# Patient Record
Sex: FEMALE | Race: WHITE | Employment: UNEMPLOYED | URBAN - METROPOLITAN AREA
[De-identification: names, ages, dates, MRNs, and addresses within clinical notes are randomized per-mention and may not be internally consistent; named-entity substitution may affect disease eponyms.]

---

## 2017-03-07 ENCOUNTER — HOSPITAL ENCOUNTER (EMERGENCY)
Age: 52
Discharge: HOME | End: 2017-03-07
Attending: EMERGENCY MEDICINE
Payer: COMMERCIAL

## 2017-03-07 VITALS
BODY MASS INDEX: 32.12 KG/M2 | HEART RATE: 85 BPM | SYSTOLIC BLOOD PRESSURE: 145 MMHG | OXYGEN SATURATION: 97 % | RESPIRATION RATE: 21 BRPM | HEIGHT: 58 IN | TEMPERATURE: 98.5 F | DIASTOLIC BLOOD PRESSURE: 65 MMHG | WEIGHT: 153 LBS

## 2017-03-07 DIAGNOSIS — H65.93 BILATERAL NON-SUPPURATIVE OTITIS MEDIA: ICD-10-CM

## 2017-03-07 DIAGNOSIS — H60.393 INFECTIVE OTITIS EXTERNA, BILATERAL: Primary | ICD-10-CM

## 2017-03-07 DIAGNOSIS — I88.9 CERVICAL LYMPHADENITIS: ICD-10-CM

## 2017-03-07 PROCEDURE — 6370000000 HC RX 637 (ALT 250 FOR IP): Performed by: EMERGENCY MEDICINE

## 2017-03-07 PROCEDURE — 99283 EMERGENCY DEPT VISIT LOW MDM: CPT

## 2017-03-07 PROCEDURE — 6360000002 HC RX W HCPCS: Performed by: EMERGENCY MEDICINE

## 2017-03-07 RX ORDER — HYDROCODONE BITARTRATE AND ACETAMINOPHEN 5; 325 MG/1; MG/1
1 TABLET ORAL EVERY 6 HOURS PRN
Qty: 10 TABLET | Refills: 0 | Status: SHIPPED | OUTPATIENT
Start: 2017-03-07 | End: 2017-03-14

## 2017-03-07 RX ORDER — ONDANSETRON 4 MG/1
4 TABLET, ORALLY DISINTEGRATING ORAL ONCE
Status: COMPLETED | OUTPATIENT
Start: 2017-03-07 | End: 2017-03-07

## 2017-03-07 RX ORDER — HYDROCODONE BITARTRATE AND ACETAMINOPHEN 5; 325 MG/1; MG/1
2 TABLET ORAL ONCE
Status: COMPLETED | OUTPATIENT
Start: 2017-03-07 | End: 2017-03-07

## 2017-03-07 RX ORDER — NEOMYCIN SULFATE, POLYMYXIN B SULFATE AND HYDROCORTISONE 10; 3.5; 1 MG/ML; MG/ML; [USP'U]/ML
4 SUSPENSION/ DROPS AURICULAR (OTIC) 4 TIMES DAILY
Qty: 10 ML | Refills: 0 | Status: SHIPPED | OUTPATIENT
Start: 2017-03-07 | End: 2017-03-17

## 2017-03-07 RX ORDER — AMOXICILLIN AND CLAVULANATE POTASSIUM 875; 125 MG/1; MG/1
1 TABLET, FILM COATED ORAL 2 TIMES DAILY
Qty: 20 TABLET | Refills: 0 | Status: SHIPPED | OUTPATIENT
Start: 2017-03-07 | End: 2017-03-17

## 2017-03-07 RX ADMIN — HYDROCODONE BITARTRATE AND ACETAMINOPHEN 2 TABLET: 5; 325 TABLET ORAL at 17:29

## 2017-03-07 RX ADMIN — ONDANSETRON 4 MG: 4 TABLET, ORALLY DISINTEGRATING ORAL at 17:29

## 2017-03-07 ASSESSMENT — ENCOUNTER SYMPTOMS
RHINORRHEA: 1
SORE THROAT: 1
ALLERGIC/IMMUNOLOGIC NEGATIVE: 1
RESPIRATORY NEGATIVE: 1
WHEEZING: 0
EYES NEGATIVE: 1
TROUBLE SWALLOWING: 0
VOMITING: 0
SHORTNESS OF BREATH: 0
ABDOMINAL PAIN: 0
STRIDOR: 0
NAUSEA: 0

## 2017-03-07 ASSESSMENT — PAIN SCALES - GENERAL
PAINLEVEL_OUTOF10: 8
PAINLEVEL_OUTOF10: 8

## 2017-03-07 ASSESSMENT — PAIN DESCRIPTION - DESCRIPTORS: DESCRIPTORS: BURNING

## 2017-03-07 ASSESSMENT — PAIN DESCRIPTION - ORIENTATION: ORIENTATION: LEFT;RIGHT

## 2017-03-07 ASSESSMENT — PAIN DESCRIPTION - PAIN TYPE: TYPE: ACUTE PAIN;CHRONIC PAIN

## 2017-03-07 ASSESSMENT — PAIN DESCRIPTION - LOCATION: LOCATION: EAR

## 2017-03-07 ASSESSMENT — PAIN DESCRIPTION - FREQUENCY: FREQUENCY: CONTINUOUS

## 2017-05-02 ENCOUNTER — OFFICE VISIT (OUTPATIENT)
Dept: INTERNAL MEDICINE | Age: 52
End: 2017-05-02

## 2017-05-02 ENCOUNTER — HOSPITAL ENCOUNTER (OUTPATIENT)
Age: 52
Setting detail: SPECIMEN
Discharge: HOME | End: 2017-05-02
Payer: COMMERCIAL

## 2017-05-02 ENCOUNTER — TELEPHONE (OUTPATIENT)
Dept: INTERNAL MEDICINE | Age: 52
End: 2017-05-02

## 2017-05-02 VITALS
HEART RATE: 81 BPM | HEIGHT: 58 IN | BODY MASS INDEX: 32.87 KG/M2 | DIASTOLIC BLOOD PRESSURE: 78 MMHG | SYSTOLIC BLOOD PRESSURE: 118 MMHG | WEIGHT: 156.6 LBS

## 2017-05-02 DIAGNOSIS — Z72.0 TOBACCO USE: ICD-10-CM

## 2017-05-02 DIAGNOSIS — Z11.4 SCREENING FOR HIV (HUMAN IMMUNODEFICIENCY VIRUS): ICD-10-CM

## 2017-05-02 DIAGNOSIS — H60.312 CHRONIC DIFFUSE OTITIS EXTERNA OF LEFT EAR: Primary | ICD-10-CM

## 2017-05-02 DIAGNOSIS — Z11.59 NEED FOR HEPATITIS C SCREENING TEST: ICD-10-CM

## 2017-05-02 DIAGNOSIS — I25.10 CORONARY ARTERY DISEASE INVOLVING NATIVE HEART WITHOUT ANGINA PECTORIS, UNSPECIFIED VESSEL OR LESION TYPE: ICD-10-CM

## 2017-05-02 DIAGNOSIS — Z12.11 COLON CANCER SCREENING: ICD-10-CM

## 2017-05-02 LAB
ALBUMIN SERPL-MCNC: 4.2 G/DL (ref 3.9–4.9)
ALP BLD-CCNC: 137 U/L (ref 40–130)
ALT SERPL-CCNC: 16 U/L (ref 0–33)
ANION GAP SERPL CALCULATED.3IONS-SCNC: 13 MEQ/L (ref 7–13)
AST SERPL-CCNC: 15 U/L (ref 0–35)
BILIRUB SERPL-MCNC: 0.2 MG/DL (ref 0–1.2)
BUN BLDV-MCNC: 15 MG/DL (ref 6–20)
CALCIUM SERPL-MCNC: 9.1 MG/DL (ref 8.6–10.2)
CHLORIDE BLD-SCNC: 100 MEQ/L (ref 98–107)
CHOLESTEROL, TOTAL: 317 MG/DL (ref 0–199)
CO2: 24 MEQ/L (ref 22–29)
CREAT SERPL-MCNC: 0.49 MG/DL (ref 0.5–0.9)
GFR AFRICAN AMERICAN: >60
GFR NON-AFRICAN AMERICAN: >60
GLOBULIN: 3 G/DL (ref 2.3–3.5)
GLUCOSE BLD-MCNC: 105 MG/DL (ref 74–109)
HDLC SERPL-MCNC: 49 MG/DL (ref 40–59)
HEPATITIS C ANTIBODY INTERPRETATION: NORMAL
LDL CHOLESTEROL CALCULATED: 239 MG/DL (ref 0–129)
POTASSIUM SERPL-SCNC: 4.2 MEQ/L (ref 3.5–5.1)
SODIUM BLD-SCNC: 137 MEQ/L (ref 132–144)
TOTAL PROTEIN: 7.2 G/DL (ref 6.4–8.1)
TRIGL SERPL-MCNC: 145 MG/DL (ref 0–200)

## 2017-05-02 PROCEDURE — 80061 LIPID PANEL: CPT

## 2017-05-02 PROCEDURE — 36415 COLL VENOUS BLD VENIPUNCTURE: CPT | Performed by: FAMILY MEDICINE

## 2017-05-02 PROCEDURE — 80053 COMPREHEN METABOLIC PANEL: CPT

## 2017-05-02 PROCEDURE — 99214 OFFICE O/P EST MOD 30 MIN: CPT | Performed by: FAMILY MEDICINE

## 2017-05-02 PROCEDURE — 86803 HEPATITIS C AB TEST: CPT

## 2017-05-02 PROCEDURE — 86703 HIV-1/HIV-2 1 RESULT ANTBDY: CPT

## 2017-05-02 RX ORDER — AMOXICILLIN AND CLAVULANATE POTASSIUM 875; 125 MG/1; MG/1
1 TABLET, FILM COATED ORAL 2 TIMES DAILY
Qty: 20 TABLET | Refills: 0 | Status: SHIPPED | OUTPATIENT
Start: 2017-05-02 | End: 2017-05-12

## 2017-05-02 RX ORDER — METHYLPREDNISOLONE 4 MG/1
TABLET ORAL
Qty: 1 KIT | Refills: 0 | Status: SHIPPED | OUTPATIENT
Start: 2017-05-02 | End: 2017-05-25 | Stop reason: ALTCHOICE

## 2017-05-02 RX ORDER — CIPROFLOXACIN AND DEXAMETHASONE 3; 1 MG/ML; MG/ML
4 SUSPENSION/ DROPS AURICULAR (OTIC) 2 TIMES DAILY
Qty: 7.5 ML | Refills: 0 | Status: SHIPPED | OUTPATIENT
Start: 2017-05-02 | End: 2017-05-09

## 2017-05-02 ASSESSMENT — PATIENT HEALTH QUESTIONNAIRE - PHQ9
SUM OF ALL RESPONSES TO PHQ QUESTIONS 1-9: 0
1. LITTLE INTEREST OR PLEASURE IN DOING THINGS: 0
2. FEELING DOWN, DEPRESSED OR HOPELESS: 0
SUM OF ALL RESPONSES TO PHQ9 QUESTIONS 1 & 2: 0

## 2017-05-02 ASSESSMENT — ENCOUNTER SYMPTOMS
EYE PAIN: 0
CHEST TIGHTNESS: 0
EYE DISCHARGE: 0
APNEA: 0
EYE ITCHING: 0
CHOKING: 0

## 2017-05-04 ENCOUNTER — OFFICE VISIT (OUTPATIENT)
Dept: INTERNAL MEDICINE | Age: 52
End: 2017-05-04

## 2017-05-04 VITALS
HEART RATE: 92 BPM | DIASTOLIC BLOOD PRESSURE: 100 MMHG | WEIGHT: 157 LBS | HEIGHT: 58 IN | BODY MASS INDEX: 32.95 KG/M2 | SYSTOLIC BLOOD PRESSURE: 148 MMHG

## 2017-05-04 DIAGNOSIS — Z72.0 TOBACCO USE: ICD-10-CM

## 2017-05-04 DIAGNOSIS — I25.10 CORONARY ARTERY DISEASE INVOLVING NATIVE HEART WITHOUT ANGINA PECTORIS, UNSPECIFIED VESSEL OR LESION TYPE: ICD-10-CM

## 2017-05-04 DIAGNOSIS — E78.00 PURE HYPERCHOLESTEROLEMIA: Primary | ICD-10-CM

## 2017-05-04 LAB — HIV-1 AND HIV-2 ANTIBODIES: NEGATIVE

## 2017-05-04 PROCEDURE — 99214 OFFICE O/P EST MOD 30 MIN: CPT | Performed by: FAMILY MEDICINE

## 2017-05-04 RX ORDER — ATORVASTATIN CALCIUM 20 MG/1
20 TABLET, FILM COATED ORAL DAILY
Qty: 30 TABLET | Refills: 3 | Status: SHIPPED | OUTPATIENT
Start: 2017-05-04

## 2017-05-04 RX ORDER — VARENICLINE TARTRATE 25 MG
KIT ORAL
Qty: 1 EACH | Refills: 0 | Status: SHIPPED | OUTPATIENT
Start: 2017-05-04

## 2017-05-04 RX ORDER — VARENICLINE TARTRATE 1 MG/1
1 TABLET, FILM COATED ORAL 2 TIMES DAILY
Qty: 60 TABLET | Refills: 3 | Status: SHIPPED | OUTPATIENT
Start: 2017-05-04

## 2017-05-25 ENCOUNTER — OFFICE VISIT (OUTPATIENT)
Dept: INTERNAL MEDICINE | Age: 52
End: 2017-05-25

## 2017-05-25 ENCOUNTER — HOSPITAL ENCOUNTER (OUTPATIENT)
Age: 52
Setting detail: SPECIMEN
Discharge: HOME | End: 2017-05-25
Payer: COMMERCIAL

## 2017-05-25 VITALS
DIASTOLIC BLOOD PRESSURE: 72 MMHG | SYSTOLIC BLOOD PRESSURE: 104 MMHG | HEART RATE: 99 BPM | BODY MASS INDEX: 32.62 KG/M2 | HEIGHT: 58 IN | WEIGHT: 155.4 LBS

## 2017-05-25 DIAGNOSIS — Z12.11 COLON CANCER SCREENING: ICD-10-CM

## 2017-05-25 DIAGNOSIS — H92.02 LEFT EAR PAIN: ICD-10-CM

## 2017-05-25 DIAGNOSIS — Z12.4 ENCOUNTER FOR SCREENING FOR CERVICAL CANCER: ICD-10-CM

## 2017-05-25 DIAGNOSIS — Z12.39 SCREENING FOR BREAST CANCER: ICD-10-CM

## 2017-05-25 DIAGNOSIS — Z00.00 ANNUAL PHYSICAL EXAM: Primary | ICD-10-CM

## 2017-05-25 LAB
CONTROL: NORMAL
HEMOCCULT STL QL: NORMAL

## 2017-05-25 PROCEDURE — 87624 HPV HI-RISK TYP POOLED RSLT: CPT

## 2017-05-25 PROCEDURE — 99396 PREV VISIT EST AGE 40-64: CPT | Performed by: FAMILY MEDICINE

## 2017-06-01 LAB
HPV COMMENT: NORMAL
HPV TYPE 16: NOT DETECTED
HPV TYPE 18: NOT DETECTED
HPVOH (OTHER TYPES): NOT DETECTED

## 2023-06-05 ENCOUNTER — TELEPHONE (OUTPATIENT)
Dept: PRIMARY CARE | Facility: CLINIC | Age: 58
End: 2023-06-05
Payer: COMMERCIAL

## 2023-06-05 NOTE — TELEPHONE ENCOUNTER
codeine-guaifenesin (Robitussin-AC)  mg/5 mL syrup     Pt states she has a deep cough and a sore throat

## 2023-06-06 DIAGNOSIS — J06.9 UPPER RESPIRATORY TRACT INFECTION, UNSPECIFIED TYPE: Primary | ICD-10-CM

## 2023-06-06 RX ORDER — CODEINE PHOSPHATE AND GUAIFENESIN 10; 100 MG/5ML; MG/5ML
5 SOLUTION ORAL EVERY 6 HOURS PRN
Qty: 200 ML | Refills: 0 | Status: SHIPPED | OUTPATIENT
Start: 2023-06-06 | End: 2023-06-13

## 2023-06-06 RX ORDER — AZITHROMYCIN 250 MG/1
TABLET, FILM COATED ORAL
Qty: 6 TABLET | Refills: 0 | Status: SHIPPED | OUTPATIENT
Start: 2023-06-06 | End: 2023-06-11

## 2023-07-07 ENCOUNTER — TELEPHONE (OUTPATIENT)
Dept: PRIMARY CARE | Facility: CLINIC | Age: 58
End: 2023-07-07
Payer: COMMERCIAL

## 2023-07-07 NOTE — TELEPHONE ENCOUNTER
Pt was in about 4-6 months ago and you told her you wanted to do BW 6m later. There is no order in chart. Was it just DM labs? She has apt the 13th but thought you wanted to do BW before then.

## 2023-07-10 DIAGNOSIS — R53.83 FATIGUE, UNSPECIFIED TYPE: Primary | ICD-10-CM

## 2023-07-11 ENCOUNTER — LAB (OUTPATIENT)
Dept: LAB | Facility: LAB | Age: 58
End: 2023-07-11
Payer: COMMERCIAL

## 2023-07-11 DIAGNOSIS — R53.83 FATIGUE, UNSPECIFIED TYPE: ICD-10-CM

## 2023-07-11 LAB
ALANINE AMINOTRANSFERASE (SGPT) (U/L) IN SER/PLAS: 29 U/L (ref 7–45)
ALBUMIN (G/DL) IN SER/PLAS: 4.5 G/DL (ref 3.4–5)
ALKALINE PHOSPHATASE (U/L) IN SER/PLAS: 114 U/L (ref 33–136)
ANION GAP IN SER/PLAS: 13 MMOL/L (ref 10–20)
ASPARTATE AMINOTRANSFERASE (SGOT) (U/L) IN SER/PLAS: 20 U/L (ref 9–39)
BASOPHILS (10*3/UL) IN BLOOD BY AUTOMATED COUNT: 0.05 X10E9/L (ref 0–0.1)
BASOPHILS/100 LEUKOCYTES IN BLOOD BY AUTOMATED COUNT: 0.7 % (ref 0–2)
BILIRUBIN TOTAL (MG/DL) IN SER/PLAS: 0.4 MG/DL (ref 0–1.2)
CALCIUM (MG/DL) IN SER/PLAS: 9.6 MG/DL (ref 8.6–10.3)
CARBON DIOXIDE, TOTAL (MMOL/L) IN SER/PLAS: 27 MMOL/L (ref 21–32)
CHLORIDE (MMOL/L) IN SER/PLAS: 101 MMOL/L (ref 98–107)
CHOLESTEROL (MG/DL) IN SER/PLAS: 217 MG/DL (ref 0–199)
CHOLESTEROL IN HDL (MG/DL) IN SER/PLAS: 39.7 MG/DL
CHOLESTEROL/HDL RATIO: 5.5
CREATININE (MG/DL) IN SER/PLAS: 0.75 MG/DL (ref 0.5–1.05)
EOSINOPHILS (10*3/UL) IN BLOOD BY AUTOMATED COUNT: 0.31 X10E9/L (ref 0–0.7)
EOSINOPHILS/100 LEUKOCYTES IN BLOOD BY AUTOMATED COUNT: 4.4 % (ref 0–6)
ERYTHROCYTE DISTRIBUTION WIDTH (RATIO) BY AUTOMATED COUNT: 13.3 % (ref 11.5–14.5)
ERYTHROCYTE MEAN CORPUSCULAR HEMOGLOBIN CONCENTRATION (G/DL) BY AUTOMATED: 33.1 G/DL (ref 32–36)
ERYTHROCYTE MEAN CORPUSCULAR VOLUME (FL) BY AUTOMATED COUNT: 90 FL (ref 80–100)
ERYTHROCYTES (10*6/UL) IN BLOOD BY AUTOMATED COUNT: 4.65 X10E12/L (ref 4–5.2)
ESTIMATED AVERAGE GLUCOSE FOR HBA1C: 143 MG/DL
GFR FEMALE: >90 ML/MIN/1.73M2
GLUCOSE (MG/DL) IN SER/PLAS: 136 MG/DL (ref 74–99)
HEMATOCRIT (%) IN BLOOD BY AUTOMATED COUNT: 41.7 % (ref 36–46)
HEMOGLOBIN (G/DL) IN BLOOD: 13.8 G/DL (ref 12–16)
HEMOGLOBIN A1C/HEMOGLOBIN TOTAL IN BLOOD: 6.6 %
IMMATURE GRANULOCYTES/100 LEUKOCYTES IN BLOOD BY AUTOMATED COUNT: 0.6 % (ref 0–0.9)
LDL: 111 MG/DL (ref 0–99)
LEUKOCYTES (10*3/UL) IN BLOOD BY AUTOMATED COUNT: 7 X10E9/L (ref 4.4–11.3)
LYMPHOCYTES (10*3/UL) IN BLOOD BY AUTOMATED COUNT: 2.63 X10E9/L (ref 1.2–4.8)
LYMPHOCYTES/100 LEUKOCYTES IN BLOOD BY AUTOMATED COUNT: 37.5 % (ref 13–44)
MONOCYTES (10*3/UL) IN BLOOD BY AUTOMATED COUNT: 0.58 X10E9/L (ref 0.1–1)
MONOCYTES/100 LEUKOCYTES IN BLOOD BY AUTOMATED COUNT: 8.3 % (ref 2–10)
NEUTROPHILS (10*3/UL) IN BLOOD BY AUTOMATED COUNT: 3.4 X10E9/L (ref 1.2–7.7)
NEUTROPHILS/100 LEUKOCYTES IN BLOOD BY AUTOMATED COUNT: 48.5 % (ref 40–80)
NON HDL CHOLESTEROL: 177 MG/DL
PLATELETS (10*3/UL) IN BLOOD AUTOMATED COUNT: 335 X10E9/L (ref 150–450)
POTASSIUM (MMOL/L) IN SER/PLAS: 4.1 MMOL/L (ref 3.5–5.3)
PROTEIN TOTAL: 6.9 G/DL (ref 6.4–8.2)
SODIUM (MMOL/L) IN SER/PLAS: 137 MMOL/L (ref 136–145)
THYROTROPIN (MIU/L) IN SER/PLAS BY DETECTION LIMIT <= 0.05 MIU/L: 10.82 MIU/L (ref 0.44–3.98)
THYROXINE (T4) (UG/DL) IN SER/PLAS: 6.8 UG/DL (ref 4.5–11.1)
TRIGLYCERIDE (MG/DL) IN SER/PLAS: 334 MG/DL (ref 0–149)
UREA NITROGEN (MG/DL) IN SER/PLAS: 20 MG/DL (ref 6–23)
VLDL: 67 MG/DL (ref 0–40)

## 2023-07-11 PROCEDURE — 84436 ASSAY OF TOTAL THYROXINE: CPT

## 2023-07-11 PROCEDURE — 80061 LIPID PANEL: CPT

## 2023-07-11 PROCEDURE — 36415 COLL VENOUS BLD VENIPUNCTURE: CPT

## 2023-07-11 PROCEDURE — 85025 COMPLETE CBC W/AUTO DIFF WBC: CPT

## 2023-07-11 PROCEDURE — 80053 COMPREHEN METABOLIC PANEL: CPT

## 2023-07-11 PROCEDURE — 83036 HEMOGLOBIN GLYCOSYLATED A1C: CPT

## 2023-07-11 PROCEDURE — 84443 ASSAY THYROID STIM HORMONE: CPT

## 2023-07-11 ASSESSMENT — ENCOUNTER SYMPTOMS
SYNCOPE: 0
ORTHOPNEA: 1
WHEEZING: 1
PND: 1
HEMOPTYSIS: 0
SWOLLEN GLANDS: 0
LEG PAIN: 1
ABDOMINAL PAIN: 0
FEVER: 0
SPUTUM PRODUCTION: 1
HEADACHES: 1
NECK PAIN: 0
RHINORRHEA: 0
CLAUDICATION: 1
SORE THROAT: 0
SHORTNESS OF BREATH: 1
VOMITING: 0

## 2023-07-13 ENCOUNTER — TELEPHONE (OUTPATIENT)
Dept: PRIMARY CARE | Facility: CLINIC | Age: 58
End: 2023-07-13

## 2023-07-13 ENCOUNTER — OFFICE VISIT (OUTPATIENT)
Dept: PRIMARY CARE | Facility: CLINIC | Age: 58
End: 2023-07-13
Payer: COMMERCIAL

## 2023-07-13 VITALS
DIASTOLIC BLOOD PRESSURE: 72 MMHG | BODY MASS INDEX: 37.36 KG/M2 | SYSTOLIC BLOOD PRESSURE: 100 MMHG | HEART RATE: 90 BPM | RESPIRATION RATE: 22 BRPM | OXYGEN SATURATION: 95 % | HEIGHT: 58 IN | WEIGHT: 178 LBS | TEMPERATURE: 97.2 F

## 2023-07-13 DIAGNOSIS — E11.9 TYPE 2 DIABETES MELLITUS WITHOUT COMPLICATION, WITHOUT LONG-TERM CURRENT USE OF INSULIN (MULTI): ICD-10-CM

## 2023-07-13 DIAGNOSIS — J44.9 COPD, SEVERE (MULTI): ICD-10-CM

## 2023-07-13 DIAGNOSIS — E03.9 HYPOTHYROIDISM, UNSPECIFIED TYPE: Primary | ICD-10-CM

## 2023-07-13 DIAGNOSIS — I20.9 ANGINA, CLASS IV (CMS-HCC): ICD-10-CM

## 2023-07-13 DIAGNOSIS — F10.29 ALCOHOL DEPENDENCE WITH UNSPECIFIED ALCOHOL-INDUCED DISORDER (MULTI): ICD-10-CM

## 2023-07-13 PROBLEM — I65.29 ASYMPTOMATIC CAROTID ARTERY STENOSIS: Status: ACTIVE | Noted: 2023-07-13

## 2023-07-13 PROBLEM — F10.20 ALCOHOL ADDICTION (MULTI): Status: ACTIVE | Noted: 2023-07-13

## 2023-07-13 PROBLEM — R22.1 NECK SWELLING: Status: ACTIVE | Noted: 2023-07-13

## 2023-07-13 PROBLEM — R49.0 HOARSENESS: Status: ACTIVE | Noted: 2023-07-13

## 2023-07-13 PROBLEM — R73.09 IMPAIRED GLUCOSE METABOLISM: Status: ACTIVE | Noted: 2023-07-13

## 2023-07-13 PROBLEM — F32.A DEPRESSION: Status: ACTIVE | Noted: 2023-07-13

## 2023-07-13 PROBLEM — F41.9 ANXIETY: Status: ACTIVE | Noted: 2023-07-13

## 2023-07-13 PROBLEM — R07.89 ATYPICAL CHEST PAIN: Status: ACTIVE | Noted: 2023-07-13

## 2023-07-13 PROBLEM — I25.10 CAD (CORONARY ARTERY DISEASE): Status: ACTIVE | Noted: 2023-07-13

## 2023-07-13 PROBLEM — E78.00 PURE HYPERCHOLESTEROLEMIA: Status: ACTIVE | Noted: 2017-05-04

## 2023-07-13 PROBLEM — F17.200 NICOTINE USE DISORDER: Status: ACTIVE | Noted: 2023-07-13

## 2023-07-13 PROBLEM — E78.5 HYPERLIPIDEMIA: Status: ACTIVE | Noted: 2023-07-13

## 2023-07-13 PROBLEM — J40 BRONCHITIS: Status: ACTIVE | Noted: 2023-07-13

## 2023-07-13 PROBLEM — R23.2 HOT FLASHES: Status: ACTIVE | Noted: 2023-07-13

## 2023-07-13 PROBLEM — N95.1 SWEATS, MENOPAUSAL: Status: ACTIVE | Noted: 2023-07-13

## 2023-07-13 PROBLEM — M54.2 NECK PAIN: Status: ACTIVE | Noted: 2023-07-13

## 2023-07-13 PROBLEM — I10 HTN (HYPERTENSION), BENIGN: Status: ACTIVE | Noted: 2023-07-13

## 2023-07-13 PROBLEM — R19.5 POSITIVE COLORECTAL CANCER SCREENING USING COLOGUARD TEST: Status: ACTIVE | Noted: 2023-07-13

## 2023-07-13 PROBLEM — B02.23 SHINGLES (HERPES ZOSTER) POLYNEUROPATHY: Status: ACTIVE | Noted: 2023-07-13

## 2023-07-13 PROCEDURE — 99214 OFFICE O/P EST MOD 30 MIN: CPT | Performed by: FAMILY MEDICINE

## 2023-07-13 RX ORDER — SYRINGE-NEEDLE,INSULIN,0.5 ML 28GX1/2"
SYRINGE, EMPTY DISPOSABLE MISCELLANEOUS
COMMUNITY
End: 2024-02-21

## 2023-07-13 RX ORDER — THEOPHYLLINE 400 MG/1
400 TABLET, EXTENDED RELEASE ORAL DAILY
COMMUNITY
End: 2023-12-29 | Stop reason: WASHOUT

## 2023-07-13 RX ORDER — LEVOTHYROXINE SODIUM 50 UG/1
50 TABLET ORAL DAILY
Qty: 30 TABLET | Refills: 11 | Status: SHIPPED | OUTPATIENT
Start: 2023-07-13 | End: 2023-07-20

## 2023-07-13 RX ORDER — NITROGLYCERIN 0.4 MG/1
0.4 TABLET SUBLINGUAL AS NEEDED
COMMUNITY
End: 2023-10-11 | Stop reason: ALTCHOICE

## 2023-07-13 RX ORDER — CEFUROXIME AXETIL 500 MG/1
1 TABLET ORAL EVERY 12 HOURS
Status: ON HOLD | COMMUNITY
Start: 2022-09-21 | End: 2024-03-03 | Stop reason: ALTCHOICE

## 2023-07-13 RX ORDER — LEVOFLOXACIN 500 MG/1
500 TABLET, FILM COATED ORAL DAILY
COMMUNITY
Start: 2023-06-12 | End: 2023-07-13 | Stop reason: ALTCHOICE

## 2023-07-13 RX ORDER — TIOTROPIUM BROMIDE INHALATION SPRAY 3.12 UG/1
SPRAY, METERED RESPIRATORY (INHALATION)
COMMUNITY
Start: 2022-03-03 | End: 2023-07-13 | Stop reason: ALTCHOICE

## 2023-07-13 RX ORDER — SERTRALINE HYDROCHLORIDE 100 MG/1
100 TABLET, FILM COATED ORAL DAILY
COMMUNITY
End: 2023-10-23

## 2023-07-13 ASSESSMENT — ENCOUNTER SYMPTOMS
MYALGIAS: 0
SYNCOPE: 0
HEMOPTYSIS: 0
LEG PAIN: 1
CLAUDICATION: 1
SORE THROAT: 0
FATIGUE: 0
NUMBNESS: 0
SPUTUM PRODUCTION: 1
DIARRHEA: 0
PND: 1
WHEEZING: 1
HEADACHES: 1
FEVER: 0
NECK PAIN: 0
ARTHRALGIAS: 0
ABDOMINAL PAIN: 0
POLYPHAGIA: 0
APPETITE CHANGE: 0
CHEST TIGHTNESS: 0
SWOLLEN GLANDS: 0
VOMITING: 0
DIZZINESS: 0
NAUSEA: 0
WEAKNESS: 0
SHORTNESS OF BREATH: 1
ORTHOPNEA: 1
POLYDIPSIA: 0
FREQUENCY: 0
RHINORRHEA: 0

## 2023-07-13 NOTE — TELEPHONE ENCOUNTER
----- Message from Raghav Garnett DO sent at 7/12/2023  5:22 PM EDT -----  In addition to above message her BS is in the diabetic range. I want her to follow up in the office

## 2023-07-13 NOTE — PROGRESS NOTES
Subjective   Patient ID: Lauren Thompson is a 58 y.o. female who presents for Med Refill (6 month med check. Pt states she feels flare ups of the COPD have become more frequent. She is having them aprox every 2 weeks. She is also on O2 at home. 2 liters. ).  Shortness of Breath  This is a recurrent problem. The current episode started more than 1 year ago. The problem occurs daily. The problem has been gradually worsening. Associated symptoms include chest pain, claudication, headaches, leg pain, leg swelling, orthopnea, PND, sputum production and wheezing. Pertinent negatives include no abdominal pain, coryza, ear pain, fever, hemoptysis, neck pain, rash, rhinorrhea, sore throat, swollen glands, syncope or vomiting. The symptoms are aggravated by occupational exposure, exercise, any activity, weather changes and lying flat.       Review of Systems   Constitutional:  Negative for appetite change, fatigue and fever.   HENT:  Negative for ear pain, rhinorrhea and sore throat.    Eyes:  Negative for visual disturbance.   Respiratory:  Positive for sputum production, shortness of breath and wheezing. Negative for hemoptysis and chest tightness.    Cardiovascular:  Positive for chest pain, orthopnea, claudication, leg swelling and PND. Negative for syncope.   Gastrointestinal:  Negative for abdominal pain, diarrhea, nausea and vomiting.   Endocrine: Negative for polydipsia, polyphagia and polyuria.   Genitourinary:  Negative for frequency and urgency.   Musculoskeletal:  Negative for arthralgias, myalgias and neck pain.   Skin:  Negative for rash.   Neurological:  Positive for headaches. Negative for dizziness, syncope, weakness and numbness.       Objective   Physical Exam  Constitutional:       Appearance: Normal appearance.   HENT:      Head: Normocephalic and atraumatic.      Mouth/Throat:      Mouth: Mucous membranes are moist.   Eyes:      Extraocular Movements: Extraocular movements intact.       Conjunctiva/sclera: Conjunctivae normal.      Pupils: Pupils are equal, round, and reactive to light.      Funduscopic exam:     Right eye: No hemorrhage or AV nicking.         Left eye: No hemorrhage or AV nicking.   Cardiovascular:      Rate and Rhythm: Normal rate and regular rhythm.      Pulses: Normal pulses.      Heart sounds: Normal heart sounds.   Pulmonary:      Effort: Pulmonary effort is normal.      Breath sounds: Wheezing and rhonchi present.   Abdominal:      General: Abdomen is flat. Bowel sounds are normal.      Palpations: Abdomen is soft.   Musculoskeletal:         General: Normal range of motion.      Cervical back: Neck supple.      Right lower le+ Edema present.      Left lower le+ Edema present.      Right foot: No swelling or Charcot foot.      Left foot: No swelling or Charcot foot.   Skin:     General: Skin is warm and dry.      Findings: No wound.   Neurological:      General: No focal deficit present.      Mental Status: She is alert and oriented to person, place, and time.      Sensory: No sensory deficit.      Motor: No weakness.   Psychiatric:         Mood and Affect: Mood normal.         Assessment/Plan   Problem List Items Addressed This Visit       Alcohol addiction (CMS/[facility])     [facility] reviewed and follow up yearly         Angina, class IV (CMS/[facility])     [facility] reviwed and follow up yearly         Relevant Medications    nitroglycerin (Nitrostat) 0.4 mg SL tablet    COPD, severe (CMS/[facility])     [facility] reviewed and follow up yearly         Hypothyroidism - Primary    Relevant Medications    Synthroid 50 mcg tablet     Other Visit Diagnoses       Type 2 diabetes mellitus without complication, without long-term current use of insulin (CMS/[facility])        Relevant Orders    Referral to Nutrition Services          Discussed yearly eye exam, regular foot checks and yearly lab evaluation.  Regular exercise and diabetic diet discussed.

## 2023-07-13 NOTE — TELEPHONE ENCOUNTER
----- Message from Raghav Garnett DO sent at 7/11/2023  4:15 PM EDT -----  Please call the patient regarding her abnormal result.  Call pt her chol is down to [phone] this is good but her thyroid is becoming mor underactive.  Would she consider a low dose of thyroid medication?

## 2023-07-14 ENCOUNTER — TELEPHONE (OUTPATIENT)
Dept: PRIMARY CARE | Facility: CLINIC | Age: 58
End: 2023-07-14
Payer: COMMERCIAL

## 2023-07-26 DIAGNOSIS — E03.9 HYPOTHYROIDISM, UNSPECIFIED TYPE: ICD-10-CM

## 2023-07-26 RX ORDER — LEVOTHYROXINE SODIUM 50 UG/1
50 TABLET ORAL DAILY
Qty: 30 TABLET | Refills: 3 | Status: SHIPPED | OUTPATIENT
Start: 2023-07-26 | End: 2023-07-26 | Stop reason: SDUPTHER

## 2023-07-26 RX ORDER — LEVOTHYROXINE SODIUM 50 UG/1
50 TABLET ORAL DAILY
Qty: 30 TABLET | Refills: 3 | Status: SHIPPED | OUTPATIENT
Start: 2023-07-26 | End: 2023-08-17

## 2023-08-10 ENCOUNTER — OFFICE VISIT (OUTPATIENT)
Dept: OBGYN CLINIC | Age: 58
End: 2023-08-10
Payer: COMMERCIAL

## 2023-08-10 VITALS
DIASTOLIC BLOOD PRESSURE: 72 MMHG | HEIGHT: 58 IN | WEIGHT: 179 LBS | SYSTOLIC BLOOD PRESSURE: 130 MMHG | BODY MASS INDEX: 37.57 KG/M2

## 2023-08-10 DIAGNOSIS — N81.11 MIDLINE CYSTOCELE: Primary | ICD-10-CM

## 2023-08-10 PROCEDURE — 3017F COLORECTAL CA SCREEN DOC REV: CPT | Performed by: OBSTETRICS & GYNECOLOGY

## 2023-08-10 PROCEDURE — G8427 DOCREV CUR MEDS BY ELIG CLIN: HCPCS | Performed by: OBSTETRICS & GYNECOLOGY

## 2023-08-10 PROCEDURE — G8417 CALC BMI ABV UP PARAM F/U: HCPCS | Performed by: OBSTETRICS & GYNECOLOGY

## 2023-08-10 PROCEDURE — 99203 OFFICE O/P NEW LOW 30 MIN: CPT | Performed by: OBSTETRICS & GYNECOLOGY

## 2023-08-10 PROCEDURE — 1036F TOBACCO NON-USER: CPT | Performed by: OBSTETRICS & GYNECOLOGY

## 2023-08-10 RX ORDER — RANOLAZINE 500 MG/1
500 TABLET, EXTENDED RELEASE ORAL EVERY 12 HOURS
COMMUNITY
Start: 2023-06-09

## 2023-08-10 RX ORDER — GUAIFENESIN 600 MG/1
TABLET, EXTENDED RELEASE ORAL
COMMUNITY
Start: 2023-06-13

## 2023-08-10 RX ORDER — THEOPHYLLINE 400 MG/1
400 TABLET, EXTENDED RELEASE ORAL DAILY
COMMUNITY
Start: 2023-07-15

## 2023-08-10 RX ORDER — FLUTICASONE FUROATE, UMECLIDINIUM BROMIDE AND VILANTEROL TRIFENATATE 100; 62.5; 25 UG/1; UG/1; UG/1
POWDER RESPIRATORY (INHALATION)
COMMUNITY
Start: 2023-02-28

## 2023-08-10 RX ORDER — IPRATROPIUM BROMIDE AND ALBUTEROL SULFATE 2.5; .5 MG/3ML; MG/3ML
3 SOLUTION RESPIRATORY (INHALATION) EVERY 6 HOURS PRN
COMMUNITY
Start: 2023-02-11

## 2023-08-10 RX ORDER — SERTRALINE HYDROCHLORIDE 100 MG/1
100 TABLET, FILM COATED ORAL DAILY
COMMUNITY
Start: 2023-07-07

## 2023-08-10 RX ORDER — LISINOPRIL AND HYDROCHLOROTHIAZIDE 25; 20 MG/1; MG/1
1 TABLET ORAL DAILY
COMMUNITY
Start: 2023-06-12

## 2023-08-10 RX ORDER — LEVOTHYROXINE SODIUM 0.05 MG/1
50 TABLET ORAL DAILY
COMMUNITY
Start: 2023-07-26

## 2023-08-10 RX ORDER — METOPROLOL SUCCINATE 25 MG/1
25 TABLET, EXTENDED RELEASE ORAL DAILY
COMMUNITY
Start: 2023-06-09

## 2023-08-10 ASSESSMENT — ENCOUNTER SYMPTOMS
CONSTIPATION: 0
ALLERGIC/IMMUNOLOGIC NEGATIVE: 1
BLOOD IN STOOL: 0
ABDOMINAL DISTENTION: 0
VOMITING: 0
ANAL BLEEDING: 0
RECTAL PAIN: 0
NAUSEA: 0
DIARRHEA: 0
EYES NEGATIVE: 1
ABDOMINAL PAIN: 0
RESPIRATORY NEGATIVE: 1

## 2023-08-10 NOTE — PROGRESS NOTES
The patient was asked if she would like a chaperone present for her intimate exam. She  Declined the chaperone.  Genaro Lea CMA ([address] [facility])

## 2023-08-14 SDOH — ECONOMIC STABILITY: FOOD INSECURITY: WITHIN THE PAST 12 MONTHS, YOU WORRIED THAT YOUR FOOD WOULD RUN OUT BEFORE YOU GOT MONEY TO BUY MORE.: SOMETIMES TRUE

## 2023-08-14 SDOH — ECONOMIC STABILITY: FOOD INSECURITY: WITHIN THE PAST 12 MONTHS, THE FOOD YOU BOUGHT JUST DIDN'T LAST AND YOU DIDN'T HAVE MONEY TO GET MORE.: NEVER TRUE

## 2023-08-14 SDOH — ECONOMIC STABILITY: TRANSPORTATION INSECURITY
IN THE PAST 12 MONTHS, HAS LACK OF TRANSPORTATION KEPT YOU FROM MEETINGS, WORK, OR FROM GETTING THINGS NEEDED FOR DAILY LIVING?: NO

## 2023-08-14 SDOH — ECONOMIC STABILITY: INCOME INSECURITY: HOW HARD IS IT FOR YOU TO PAY FOR THE VERY BASICS LIKE FOOD, HOUSING, MEDICAL CARE, AND HEATING?: SOMEWHAT HARD

## 2023-08-14 SDOH — ECONOMIC STABILITY: HOUSING INSECURITY
IN THE LAST 12 MONTHS, WAS THERE A TIME WHEN YOU DID NOT HAVE A STEADY PLACE TO SLEEP OR SLEPT IN A SHELTER (INCLUDING NOW)?: NO

## 2023-08-14 ASSESSMENT — PATIENT HEALTH QUESTIONNAIRE - PHQ9
1. LITTLE INTEREST OR PLEASURE IN DOING THINGS: SEVERAL DAYS
SUM OF ALL RESPONSES TO PHQ9 QUESTIONS 1 & 2: 2
2. FEELING DOWN, DEPRESSED OR HOPELESS: SEVERAL DAYS
2. FEELING DOWN, DEPRESSED OR HOPELESS: 1
SUM OF ALL RESPONSES TO PHQ9 QUESTIONS 1 & 2: 2
1. LITTLE INTEREST OR PLEASURE IN DOING THINGS: 1
SUM OF ALL RESPONSES TO PHQ QUESTIONS 1-9: 2

## 2023-08-17 ENCOUNTER — OFFICE VISIT (OUTPATIENT)
Dept: OBGYN CLINIC | Age: 58
End: 2023-08-17
Payer: COMMERCIAL

## 2023-08-17 VITALS
HEIGHT: 58 IN | DIASTOLIC BLOOD PRESSURE: 74 MMHG | WEIGHT: 178 LBS | HEART RATE: 86 BPM | BODY MASS INDEX: 37.36 KG/M2 | SYSTOLIC BLOOD PRESSURE: 116 MMHG

## 2023-08-17 DIAGNOSIS — N81.11 MIDLINE CYSTOCELE: Primary | ICD-10-CM

## 2023-08-17 PROBLEM — Z80.41 FAMILY HISTORY OF OVARIAN CANCER: Status: ACTIVE | Noted: 2023-08-17

## 2023-08-17 PROBLEM — N39.3 SUI (STRESS URINARY INCONTINENCE, FEMALE): Status: ACTIVE | Noted: 2023-08-17

## 2023-08-17 PROBLEM — N81.10 CYSTOCELE, UNSPECIFIED: Status: ACTIVE | Noted: 2023-08-17

## 2023-08-17 PROCEDURE — 1036F TOBACCO NON-USER: CPT | Performed by: OBSTETRICS & GYNECOLOGY

## 2023-08-17 PROCEDURE — 99214 OFFICE O/P EST MOD 30 MIN: CPT | Performed by: OBSTETRICS & GYNECOLOGY

## 2023-08-17 PROCEDURE — 3017F COLORECTAL CA SCREEN DOC REV: CPT | Performed by: OBSTETRICS & GYNECOLOGY

## 2023-08-17 PROCEDURE — G8427 DOCREV CUR MEDS BY ELIG CLIN: HCPCS | Performed by: OBSTETRICS & GYNECOLOGY

## 2023-08-17 PROCEDURE — G8417 CALC BMI ABV UP PARAM F/U: HCPCS | Performed by: OBSTETRICS & GYNECOLOGY

## 2023-08-17 RX ORDER — CONJUGATED ESTROGENS 0.62 MG/G
0.5 CREAM VAGINAL DAILY
Qty: 30 G | Refills: 0
Start: 2023-08-17 | End: 2023-08-18

## 2023-08-17 ASSESSMENT — ENCOUNTER SYMPTOMS
COLOR CHANGE: 0
WHEEZING: 0
CHEST TIGHTNESS: 0
TROUBLE SWALLOWING: 0
COUGH: 0
BACK PAIN: 0
SHORTNESS OF BREATH: 0
NAUSEA: 0
VOMITING: 0
BLOOD IN STOOL: 0
ABDOMINAL DISTENTION: 0
VOICE CHANGE: 0
SORE THROAT: 0
ABDOMINAL PAIN: 0
CONSTIPATION: 0

## 2023-08-17 NOTE — PROGRESS NOTES
HPI:  Mario Yan (: 1965) is a 59 y.o. female, Established patient, here for evaluation of the following chief complaint(s):  Consultation (Consult from Dr. Nathaniel Florentino for her Bladder.)  Patient is here for evaluation of a cystocele and stress urinary incontinence. A 56-year-old menopausal female. She is not on any hormones. She complains of dyspareunia. She had a hysterectomy for endometriosis and cervical dysplasia. There is a family history of ovarian cancer. SUBJECTIVE/OBJECTIVE:    Past Surgical History:   Procedure Laterality Date    APPENDECTOMY      HYSTERECTOMY (CERVIX STATUS UNKNOWN)      HYSTERECTOMY (CERVIX STATUS UNKNOWN)      partial    HYSTERECTOMY, TOTAL ABDOMINAL (CERVIX REMOVED)      NOSE SURGERY      broken in 2 places    TONSILLECTOMY          Review of Systems   Constitutional:  Negative for activity change, appetite change, fatigue and unexpected weight change. HENT:  Negative for dental problem, ear pain, hearing loss, nosebleeds, sore throat, trouble swallowing and voice change. Eyes:  Negative for visual disturbance. Respiratory:  Negative for cough, chest tightness, shortness of breath and wheezing. Cardiovascular:  Negative for chest pain and palpitations. Gastrointestinal:  Negative for abdominal distention, abdominal pain, blood in stool, constipation, nausea and vomiting. Endocrine: Negative for cold intolerance, heat intolerance, polydipsia, polyphagia and polyuria. Genitourinary:  Positive for difficulty urinating. Negative for dyspareunia, dysuria, flank pain, frequency, genital sores, hematuria, menstrual problem, pelvic pain, urgency, vaginal bleeding, vaginal discharge and vaginal pain. Musculoskeletal:  Negative for arthralgias, back pain, joint swelling and myalgias. Skin:  Negative for color change and rash. Allergic/Immunologic: Negative for environmental allergies, food allergies and immunocompromised state.    Neurological:  Negative

## 2023-08-18 RX ORDER — CONJUGATED ESTROGENS 0.62 MG/G
CREAM VAGINAL
Qty: 30 G | Refills: 0 | Status: SHIPPED | OUTPATIENT
Start: 2023-08-18

## 2023-08-19 RX ORDER — SODIUM CHLORIDE 9 MG/ML
INJECTION, SOLUTION INTRAVENOUS PRN
OUTPATIENT
Start: 2023-08-19

## 2023-08-19 RX ORDER — SODIUM CHLORIDE, SODIUM LACTATE, POTASSIUM CHLORIDE, CALCIUM CHLORIDE 600; 310; 30; 20 MG/100ML; MG/100ML; MG/100ML; MG/100ML
INJECTION, SOLUTION INTRAVENOUS CONTINUOUS
OUTPATIENT
Start: 2023-08-19

## 2023-08-19 RX ORDER — SODIUM CHLORIDE 0.9 % (FLUSH) 0.9 %
5-40 SYRINGE (ML) INJECTION EVERY 12 HOURS SCHEDULED
OUTPATIENT
Start: 2023-08-19

## 2023-08-19 RX ORDER — SODIUM CHLORIDE 0.9 % (FLUSH) 0.9 %
5-40 SYRINGE (ML) INJECTION PRN
OUTPATIENT
Start: 2023-08-19

## 2023-08-24 ENCOUNTER — HOSPITAL ENCOUNTER (OUTPATIENT)
Dept: ULTRASOUND IMAGING | Age: 58
Discharge: HOME | End: 2023-08-26
Payer: COMMERCIAL

## 2023-08-24 DIAGNOSIS — N81.11 MIDLINE CYSTOCELE: ICD-10-CM

## 2023-08-24 PROCEDURE — 76830 TRANSVAGINAL US NON-OB: CPT

## 2023-08-24 PROCEDURE — 93975 VASCULAR STUDY: CPT

## 2023-08-24 PROCEDURE — 76856 US EXAM PELVIC COMPLETE: CPT

## 2023-08-28 DIAGNOSIS — E03.9 HYPOTHYROIDISM, UNSPECIFIED TYPE: Primary | ICD-10-CM

## 2023-08-30 PROBLEM — E66.812 CLASS 2 OBESITY WITH BODY MASS INDEX (BMI) OF 35 TO 39.9 WITHOUT COMORBIDITY: Status: ACTIVE | Noted: 2023-08-30

## 2023-08-30 PROBLEM — E66.9 CLASS 2 OBESITY WITH BODY MASS INDEX (BMI) OF 35 TO 39.9 WITHOUT COMORBIDITY: Status: ACTIVE | Noted: 2023-08-30

## 2023-08-30 RX ORDER — CONJUGATED ESTROGENS 0.62 MG/G
CREAM VAGINAL 2 TIMES DAILY
COMMUNITY
Start: 2023-08-18 | End: 2023-10-11 | Stop reason: ALTCHOICE

## 2023-09-07 ENCOUNTER — TELEPHONE (OUTPATIENT)
Dept: OBGYN CLINIC | Age: 58
End: 2023-09-07

## 2023-09-07 NOTE — TELEPHONE ENCOUNTER
Patient informed. Would like to know if she should stop the 325mg. Asprin that she takes daily before her surgery? Please advise.

## 2023-09-07 NOTE — TELEPHONE ENCOUNTER
----- Message from Rachell Gibbons DO sent at 9/4/2023  8:31 AM EDT -----  Both ovaries are present and normal. Will remove

## 2023-09-12 LAB — THEOPHYLLINE (UG/ML) IN SER/PLAS: 4.2 UG/ML (ref 10–20)

## 2023-09-13 RX ORDER — PREDNISONE 10 MG/1
TABLET ORAL
Status: ON HOLD | COMMUNITY
Start: 2023-09-12 | End: 2024-03-03 | Stop reason: ALTCHOICE

## 2023-09-13 RX ORDER — CONJUGATED ESTROGENS 0.62 MG/G
CREAM VAGINAL
Qty: 30 G | Refills: 0 | Status: SHIPPED | OUTPATIENT
Start: 2023-09-13

## 2023-09-14 RX ORDER — CONJUGATED ESTROGENS 0.62 MG/G
CREAM VAGINAL
Qty: 30 G | Refills: 0 | OUTPATIENT
Start: 2023-09-14

## 2023-09-18 ENCOUNTER — HOSPITAL ENCOUNTER (OUTPATIENT)
Dept: PREADMISSION TESTING | Age: 58
Discharge: HOME | End: 2023-09-22
Payer: COMMERCIAL

## 2023-09-18 VITALS
BODY MASS INDEX: 37.83 KG/M2 | RESPIRATION RATE: 16 BRPM | OXYGEN SATURATION: 98 % | WEIGHT: 180.2 LBS | HEIGHT: 58 IN | HEART RATE: 77 BPM | TEMPERATURE: 98.4 F | DIASTOLIC BLOOD PRESSURE: 79 MMHG | SYSTOLIC BLOOD PRESSURE: 156 MMHG

## 2023-09-18 PROBLEM — E03.9 HYPOTHYROIDISM: Status: ACTIVE | Noted: 2023-07-13

## 2023-09-18 PROBLEM — R07.89 ATYPICAL CHEST PAIN: Status: ACTIVE | Noted: 2023-07-13

## 2023-09-18 PROBLEM — E11.9 DIABETES (HCC): Status: ACTIVE | Noted: 2023-09-18

## 2023-09-18 PROBLEM — F41.9 ANXIETY: Status: ACTIVE | Noted: 2023-07-13

## 2023-09-18 PROBLEM — F10.20 ALCOHOL ADDICTION (HCC): Status: ACTIVE | Noted: 2023-07-13

## 2023-09-18 PROBLEM — E78.5 HYPERLIPIDEMIA: Status: ACTIVE | Noted: 2023-07-13

## 2023-09-18 PROBLEM — I10 HTN (HYPERTENSION), BENIGN: Status: ACTIVE | Noted: 2023-07-13

## 2023-09-18 PROBLEM — J44.9 COPD, SEVERE (HCC): Status: ACTIVE | Noted: 2020-12-17

## 2023-09-18 PROBLEM — I65.29 ASYMPTOMATIC CAROTID ARTERY STENOSIS: Status: ACTIVE | Noted: 2023-07-13

## 2023-09-18 PROBLEM — I20.9 ANGINA, CLASS IV (HCC): Status: ACTIVE | Noted: 2023-07-13

## 2023-09-18 PROBLEM — F32.A DEPRESSION: Status: ACTIVE | Noted: 2023-07-13

## 2023-09-18 LAB
BILIRUB UR QL STRIP: NEGATIVE
CLARITY UR: CLEAR
COLOR UR: YELLOW
EKG ATRIAL RATE: 75 BPM
EKG P AXIS: -17 DEGREES
EKG P-R INTERVAL: 162 MS
EKG Q-T INTERVAL: 388 MS
EKG QRS DURATION: 84 MS
EKG QTC CALCULATION (BAZETT): 433 MS
EKG R AXIS: 73 DEGREES
EKG T AXIS: 55 DEGREES
EKG VENTRICULAR RATE: 75 BPM
GLUCOSE UR STRIP-MCNC: NEGATIVE MG/DL
HGB UR QL STRIP: NEGATIVE
KETONES UR STRIP-MCNC: NEGATIVE MG/DL
LEUKOCYTE ESTERASE UR QL STRIP: NEGATIVE
NITRITE UR QL STRIP: NEGATIVE
PH UR STRIP: 6 [PH] (ref 5–9)
PROT UR STRIP-MCNC: NEGATIVE MG/DL
SP GR UR STRIP: 1.01 (ref 1–1.03)
UROBILINOGEN UR STRIP-ACNC: 0.2 E.U./DL

## 2023-09-18 PROCEDURE — 93005 ELECTROCARDIOGRAM TRACING: CPT | Performed by: FAMILY MEDICINE

## 2023-09-18 PROCEDURE — 81003 URINALYSIS AUTO W/O SCOPE: CPT

## 2023-09-18 PROCEDURE — 93010 ELECTROCARDIOGRAM REPORT: CPT | Performed by: INTERNAL MEDICINE

## 2023-09-18 RX ORDER — PREDNISONE 10 MG/1
10 TABLET ORAL DAILY
COMMUNITY
Start: 2023-09-12

## 2023-09-18 RX ORDER — DOXYCYCLINE HYCLATE 100 MG/1
100 CAPSULE ORAL DAILY
COMMUNITY
Start: 2023-09-12

## 2023-09-18 RX ORDER — ATORVASTATIN CALCIUM 40 MG/1
40 TABLET, FILM COATED ORAL DAILY
COMMUNITY
Start: 2023-09-08

## 2023-09-18 ASSESSMENT — ENCOUNTER SYMPTOMS
EYE ITCHING: 0
SORE THROAT: 0
VOMITING: 0
ABDOMINAL DISTENTION: 0
EYE PAIN: 0
SHORTNESS OF BREATH: 1
SINUS PRESSURE: 0
CHOKING: 0
SINUS PAIN: 0
APNEA: 0
RHINORRHEA: 0
EYE DISCHARGE: 0
WHEEZING: 1
TROUBLE SWALLOWING: 0
NAUSEA: 0
DIARRHEA: 0
COUGH: 1
CHEST TIGHTNESS: 0
ABDOMINAL PAIN: 0
BACK PAIN: 0
EYE REDNESS: 0
PHOTOPHOBIA: 0
CONSTIPATION: 0
FACIAL SWELLING: 0

## 2023-09-18 NOTE — H&P
She is better today, no wheezes or SOB. Will contact Dr. Licha Merchant for recent office visit and clearance. Notified Dr. Anuradha Ward office and will contact office again when I have any correspondence from Dr. Licha Merchant. Patient is followed by Dr. Fung, cardiology. She has had 2 myocardial infarctions and 3 cardiac caths (no stents). She was seen 12/19/22 and had cath that showed some stenosis and left ventricle EF 60%. Patient is followed by Dr. Ingrid Hargrove, vascular surgeon, for her dx carotid artery stenosis. Her last appointment was 5/15/23 and her imaging showed: Left 50-69% stenosis, similar to prior scan, and right-no stenosis. She has a follow up yearly. Known Anesthesia Problems: None  Patient Objection to Receiving Blood Products: No  Personal of FH of DVT/PE: No    Medical/Cardiac Clearance Needed: Not Required    ALLERGIES: Patient has no known allergies.     PAST MEDICAL HISTORY:    Past Medical History:   Diagnosis Date    Abnormal Pap smear of cervix     Arthritis     CAD (coronary artery disease)     CAD (coronary artery disease)     MI x 2 no stents    COPD (chronic obstructive pulmonary disease) ([address])     Diabetes ([address])     currently diet controlled    Dysplasia of cervix     Endometriosis     Hyperlipidemia     Hypertension     Hypotension     Pure hypercholesterolemia 05/04/2017    Thyroid disease     Tobacco use 05/02/2017     PAST SURGICAL HISTORY:    Past Surgical History:   Procedure Laterality Date    APPENDECTOMY      COLONOSCOPY  04/2023    COSMETIC SURGERY      broken nose, repaired    HYSTERECTOMY (CERVIX STATUS UNKNOWN)      abdominal hysterectomy    TONSILLECTOMY      VASCULAR SURGERY      cardiac cath x3 (no stents)     FAMILY HISTORY:    Family History   Problem Relation Age of Onset    Diabetes Mother     Heart Disease Mother     High Cholesterol Mother     High Blood Pressure Mother     High Blood Pressure Father     High Cholesterol Father     Diabetes Sister     High

## 2023-09-20 NOTE — PROGRESS NOTES
Received office note from Dr. Anthony Farley, pulmonology, with clearance for surgery. Office note placed in paper chart.

## 2023-09-24 ENCOUNTER — ANESTHESIA EVENT (OUTPATIENT)
Dept: OPERATING ROOM | Age: 58
End: 2023-09-24
Payer: COMMERCIAL

## 2023-09-24 ASSESSMENT — COPD QUESTIONNAIRES: CAT_SEVERITY: SEVERE

## 2023-09-24 ASSESSMENT — LIFESTYLE VARIABLES: SMOKING_STATUS: 1

## 2023-09-24 NOTE — ANESTHESIA PRE PROCEDURE
Department of Anesthesiology  Preprocedure Note       Name:  Joselo Barba   Age:  59 y.o.  :  1965                                          MRN:  [id number]         Date:  2023      Surgeon: Shahla Rondon):  Jason Argueta DO    Procedure: Procedure(s):  OPERATIVE LAPAROSCOPIC BILATERAL SALPINGO-OOPHORECTOMY,  TRANSVAGINAL TRANSOBTURATOR TAPE, CYSTSCOPY, POSSIBLE ANTERIOR REPAIR    Medications prior to admission:   Prior to Admission medications    Medication Sig Start Date End Date Taking? Authorizing Provider   atorvastatin (LIPITOR) 40 MG tablet Take 1 tablet by mouth daily 23   Historical Provider, MD   doxycycline hyclate (VIBRAMYCIN) 100 MG capsule Take 1 capsule by mouth daily 23   Historical Provider, MD   predniSONE (DELTASONE) 10 MG tablet Take 1 tablet by mouth daily 23   Historical Provider, MD   PREMARIN 0.625 MG/GM CREA vaginal cream APPLY SPARINGLY TO VAGINA TWICE WEEKLY 23   Darron Sari Lees DO   ipratropium 0.5 mg-albuterol 2.5 mg (DUONEB) 0.5-2.5 (3) MG/3ML SOLN nebulizer solution Inhale 3 mLs into the lungs every 6 hours as needed 23   Historical Provider, MD   lisinopril-hydroCHLOROthiazide (PRINZIDE;ZESTORETIC) 20-25 MG per tablet Take 1 tablet by mouth daily 23   Historical Provider, MD   metoprolol succinate (TOPROL XL) 25 MG extended release tablet Take 1 tablet by mouth daily 23   Historical Provider, MD   MUCUS RELIEF 600 MG extended release tablet TAKE 1 OR 2 TABLETS BY MOUTH TWICE DAILY AS NEEDED FOR CONGESTION. 23   Historical Provider, MD   ranolazine (RANEXA) 500 MG extended release tablet Take 1 tablet by mouth in the morning and 1 tablet in the evening.  23   Historical Provider, MD   theophylline (UNIPHYL) 400 MG extended release tablet Take 1 tablet by mouth daily 7/15/23   Historical Provider, MD   levothyroxine (SYNTHROID) 50 MCG tablet Take 1 tablet by mouth daily 23   Historical Provider, MD   sertraline (ZOLOFT) 100 MG

## 2023-09-25 ENCOUNTER — HOSPITAL ENCOUNTER (OUTPATIENT)
Age: 58
Setting detail: OUTPATIENT SURGERY
Discharge: HOME | End: 2023-09-25
Attending: OBSTETRICS & GYNECOLOGY | Admitting: OBSTETRICS & GYNECOLOGY
Payer: COMMERCIAL

## 2023-09-25 ENCOUNTER — ANESTHESIA (OUTPATIENT)
Dept: OPERATING ROOM | Age: 58
End: 2023-09-25
Payer: COMMERCIAL

## 2023-09-25 VITALS
HEIGHT: 58 IN | OXYGEN SATURATION: 95 % | BODY MASS INDEX: 37.79 KG/M2 | HEART RATE: 75 BPM | SYSTOLIC BLOOD PRESSURE: 113 MMHG | WEIGHT: 180 LBS | DIASTOLIC BLOOD PRESSURE: 58 MMHG | RESPIRATION RATE: 16 BRPM | TEMPERATURE: 97.7 F

## 2023-09-25 DIAGNOSIS — N81.10 CYSTOCELE, UNSPECIFIED: ICD-10-CM

## 2023-09-25 DIAGNOSIS — Z80.41 FAMILY HISTORY OF OVARIAN CANCER: ICD-10-CM

## 2023-09-25 DIAGNOSIS — G89.18 POSTOPERATIVE PAIN: Primary | ICD-10-CM

## 2023-09-25 DIAGNOSIS — N39.3 SUI (STRESS URINARY INCONTINENCE, FEMALE): ICD-10-CM

## 2023-09-25 LAB
GLUCOSE BLD-MCNC: 114 MG/DL (ref 70–99)
GLUCOSE BLD-MCNC: 140 MG/DL (ref 70–99)
PERFORMED ON: ABNORMAL
PERFORMED ON: ABNORMAL

## 2023-09-25 PROCEDURE — 2500000003 HC RX 250 WO HCPCS

## 2023-09-25 PROCEDURE — 3600000004 HC SURGERY LEVEL 4 BASE: Performed by: OBSTETRICS & GYNECOLOGY

## 2023-09-25 PROCEDURE — 2580000003 HC RX 258: Performed by: OBSTETRICS & GYNECOLOGY

## 2023-09-25 PROCEDURE — 6360000002 HC RX W HCPCS: Performed by: ANESTHESIOLOGY

## 2023-09-25 PROCEDURE — 2720000010 HC SURG SUPPLY STERILE: Performed by: OBSTETRICS & GYNECOLOGY

## 2023-09-25 PROCEDURE — 6370000000 HC RX 637 (ALT 250 FOR IP): Performed by: OBSTETRICS & GYNECOLOGY

## 2023-09-25 PROCEDURE — 7100000011 HC PHASE II RECOVERY - ADDTL 15 MIN: Performed by: OBSTETRICS & GYNECOLOGY

## 2023-09-25 PROCEDURE — 7100000010 HC PHASE II RECOVERY - FIRST 15 MIN: Performed by: OBSTETRICS & GYNECOLOGY

## 2023-09-25 PROCEDURE — 3700000001 HC ADD 15 MINUTES (ANESTHESIA): Performed by: OBSTETRICS & GYNECOLOGY

## 2023-09-25 PROCEDURE — C1771 REP DEV, URINARY, W/SLING: HCPCS | Performed by: OBSTETRICS & GYNECOLOGY

## 2023-09-25 PROCEDURE — 3600000014 HC SURGERY LEVEL 4 ADDTL 15MIN: Performed by: OBSTETRICS & GYNECOLOGY

## 2023-09-25 PROCEDURE — 64488 TAP BLOCK BI INJECTION: CPT | Performed by: ANESTHESIOLOGY

## 2023-09-25 PROCEDURE — 2709999900 HC NON-CHARGEABLE SUPPLY: Performed by: OBSTETRICS & GYNECOLOGY

## 2023-09-25 PROCEDURE — 6360000002 HC RX W HCPCS: Performed by: OBSTETRICS & GYNECOLOGY

## 2023-09-25 PROCEDURE — 2500000003 HC RX 250 WO HCPCS: Performed by: OBSTETRICS & GYNECOLOGY

## 2023-09-25 PROCEDURE — 7100000001 HC PACU RECOVERY - ADDTL 15 MIN: Performed by: OBSTETRICS & GYNECOLOGY

## 2023-09-25 PROCEDURE — 7100000000 HC PACU RECOVERY - FIRST 15 MIN: Performed by: OBSTETRICS & GYNECOLOGY

## 2023-09-25 PROCEDURE — 3700000000 HC ANESTHESIA ATTENDED CARE: Performed by: OBSTETRICS & GYNECOLOGY

## 2023-09-25 PROCEDURE — 2500000003 HC RX 250 WO HCPCS: Performed by: STUDENT IN AN ORGANIZED HEALTH CARE EDUCATION/TRAINING PROGRAM

## 2023-09-25 PROCEDURE — A4217 STERILE WATER/SALINE, 500 ML: HCPCS | Performed by: OBSTETRICS & GYNECOLOGY

## 2023-09-25 PROCEDURE — 88305 TISSUE EXAM BY PATHOLOGIST: CPT

## 2023-09-25 PROCEDURE — 6360000002 HC RX W HCPCS: Performed by: STUDENT IN AN ORGANIZED HEALTH CARE EDUCATION/TRAINING PROGRAM

## 2023-09-25 DEVICE — SLING INCONT TENS FRE SUPP FOR OBT SYS GYNECARE TVT: Type: IMPLANTABLE DEVICE | Site: URETHRA | Status: FUNCTIONAL

## 2023-09-25 RX ORDER — DIPHENHYDRAMINE HYDROCHLORIDE 50 MG/ML
12.5 INJECTION INTRAMUSCULAR; INTRAVENOUS
Status: DISCONTINUED | OUTPATIENT
Start: 2023-09-25 | End: 2023-09-25 | Stop reason: HOSPADM

## 2023-09-25 RX ORDER — MEPERIDINE HYDROCHLORIDE 25 MG/ML
12.5 INJECTION INTRAMUSCULAR; INTRAVENOUS; SUBCUTANEOUS EVERY 5 MIN PRN
Status: DISCONTINUED | OUTPATIENT
Start: 2023-09-25 | End: 2023-09-25 | Stop reason: HOSPADM

## 2023-09-25 RX ORDER — SODIUM CHLORIDE 0.9 % (FLUSH) 0.9 %
5-40 SYRINGE (ML) INJECTION PRN
Status: DISCONTINUED | OUTPATIENT
Start: 2023-09-25 | End: 2023-09-25 | Stop reason: HOSPADM

## 2023-09-25 RX ORDER — OXYCODONE HYDROCHLORIDE 5 MG/1
5 TABLET ORAL PRN
Status: DISCONTINUED | OUTPATIENT
Start: 2023-09-25 | End: 2023-09-25 | Stop reason: HOSPADM

## 2023-09-25 RX ORDER — SODIUM CHLORIDE, SODIUM LACTATE, POTASSIUM CHLORIDE, CALCIUM CHLORIDE 600; 310; 30; 20 MG/100ML; MG/100ML; MG/100ML; MG/100ML
INJECTION, SOLUTION INTRAVENOUS CONTINUOUS
Status: DISCONTINUED | OUTPATIENT
Start: 2023-09-25 | End: 2023-09-25 | Stop reason: HOSPADM

## 2023-09-25 RX ORDER — LIDOCAINE HYDROCHLORIDE 20 MG/ML
INJECTION, SOLUTION INTRAVENOUS PRN
Status: DISCONTINUED | OUTPATIENT
Start: 2023-09-25 | End: 2023-09-25 | Stop reason: SDUPTHER

## 2023-09-25 RX ORDER — MAGNESIUM HYDROXIDE 1200 MG/15ML
LIQUID ORAL CONTINUOUS PRN
Status: DISCONTINUED | OUTPATIENT
Start: 2023-09-25 | End: 2023-09-25 | Stop reason: HOSPADM

## 2023-09-25 RX ORDER — FENTANYL CITRATE 50 UG/ML
INJECTION, SOLUTION INTRAMUSCULAR; INTRAVENOUS PRN
Status: DISCONTINUED | OUTPATIENT
Start: 2023-09-25 | End: 2023-09-25 | Stop reason: SDUPTHER

## 2023-09-25 RX ORDER — LIDOCAINE HYDROCHLORIDE 20 MG/ML
JELLY TOPICAL PRN
Status: DISCONTINUED | OUTPATIENT
Start: 2023-09-25 | End: 2023-09-25 | Stop reason: HOSPADM

## 2023-09-25 RX ORDER — LABETALOL HYDROCHLORIDE 5 MG/ML
10 INJECTION, SOLUTION INTRAVENOUS
Status: DISCONTINUED | OUTPATIENT
Start: 2023-09-25 | End: 2023-09-25 | Stop reason: HOSPADM

## 2023-09-25 RX ORDER — ROPIVACAINE HYDROCHLORIDE 5 MG/ML
INJECTION, SOLUTION EPIDURAL; INFILTRATION; PERINEURAL
Status: COMPLETED | OUTPATIENT
Start: 2023-09-25 | End: 2023-09-25

## 2023-09-25 RX ORDER — FENTANYL CITRATE 0.05 MG/ML
50 INJECTION, SOLUTION INTRAMUSCULAR; INTRAVENOUS EVERY 5 MIN PRN
Status: DISCONTINUED | OUTPATIENT
Start: 2023-09-25 | End: 2023-09-25 | Stop reason: HOSPADM

## 2023-09-25 RX ORDER — IBUPROFEN 600 MG/1
600 TABLET ORAL EVERY 8 HOURS SCHEDULED
Status: DISCONTINUED | OUTPATIENT
Start: 2023-09-25 | End: 2023-09-25 | Stop reason: HOSPADM

## 2023-09-25 RX ORDER — HYDROMORPHONE HYDROCHLORIDE 1 MG/ML
0.5 INJECTION, SOLUTION INTRAMUSCULAR; INTRAVENOUS; SUBCUTANEOUS
Status: DISCONTINUED | OUTPATIENT
Start: 2023-09-25 | End: 2023-09-25 | Stop reason: HOSPADM

## 2023-09-25 RX ORDER — MAGNESIUM HYDROXIDE 1200 MG/15ML
LIQUID ORAL PRN
Status: DISCONTINUED | OUTPATIENT
Start: 2023-09-25 | End: 2023-09-25 | Stop reason: HOSPADM

## 2023-09-25 RX ORDER — DEXAMETHASONE SODIUM PHOSPHATE 10 MG/ML
INJECTION INTRAMUSCULAR; INTRAVENOUS PRN
Status: DISCONTINUED | OUTPATIENT
Start: 2023-09-25 | End: 2023-09-25 | Stop reason: SDUPTHER

## 2023-09-25 RX ORDER — ONDANSETRON 2 MG/ML
4 INJECTION INTRAMUSCULAR; INTRAVENOUS EVERY 6 HOURS PRN
Status: DISCONTINUED | OUTPATIENT
Start: 2023-09-25 | End: 2023-09-25 | Stop reason: HOSPADM

## 2023-09-25 RX ORDER — ONDANSETRON 2 MG/ML
INJECTION INTRAMUSCULAR; INTRAVENOUS PRN
Status: DISCONTINUED | OUTPATIENT
Start: 2023-09-25 | End: 2023-09-25 | Stop reason: SDUPTHER

## 2023-09-25 RX ORDER — OXYCODONE HYDROCHLORIDE 5 MG/1
5 TABLET ORAL EVERY 4 HOURS PRN
Status: DISCONTINUED | OUTPATIENT
Start: 2023-09-25 | End: 2023-09-25 | Stop reason: HOSPADM

## 2023-09-25 RX ORDER — LIDOCAINE HYDROCHLORIDE AND EPINEPHRINE 10; 10 MG/ML; UG/ML
INJECTION, SOLUTION INFILTRATION; PERINEURAL PRN
Status: DISCONTINUED | OUTPATIENT
Start: 2023-09-25 | End: 2023-09-25 | Stop reason: HOSPADM

## 2023-09-25 RX ORDER — ROCURONIUM BROMIDE 10 MG/ML
INJECTION, SOLUTION INTRAVENOUS PRN
Status: DISCONTINUED | OUTPATIENT
Start: 2023-09-25 | End: 2023-09-25 | Stop reason: SDUPTHER

## 2023-09-25 RX ORDER — MIDAZOLAM HYDROCHLORIDE 1 MG/ML
INJECTION INTRAMUSCULAR; INTRAVENOUS PRN
Status: DISCONTINUED | OUTPATIENT
Start: 2023-09-25 | End: 2023-09-25 | Stop reason: SDUPTHER

## 2023-09-25 RX ORDER — SODIUM CHLORIDE 0.9 % (FLUSH) 0.9 %
5-40 SYRINGE (ML) INJECTION EVERY 12 HOURS SCHEDULED
Status: DISCONTINUED | OUTPATIENT
Start: 2023-09-25 | End: 2023-09-25 | Stop reason: HOSPADM

## 2023-09-25 RX ORDER — ONDANSETRON 4 MG/1
4 TABLET, ORALLY DISINTEGRATING ORAL EVERY 8 HOURS PRN
Status: DISCONTINUED | OUTPATIENT
Start: 2023-09-25 | End: 2023-09-25 | Stop reason: HOSPADM

## 2023-09-25 RX ORDER — TRAMADOL HYDROCHLORIDE 50 MG/1
50 TABLET ORAL EVERY 6 HOURS PRN
Qty: 20 TABLET | Refills: 0 | Status: SHIPPED | OUTPATIENT
Start: 2023-09-25 | End: 2023-09-28

## 2023-09-25 RX ORDER — SODIUM CHLORIDE 9 MG/ML
INJECTION, SOLUTION INTRAVENOUS PRN
Status: DISCONTINUED | OUTPATIENT
Start: 2023-09-25 | End: 2023-09-25 | Stop reason: HOSPADM

## 2023-09-25 RX ORDER — PROCHLORPERAZINE EDISYLATE 5 MG/ML
5 INJECTION INTRAMUSCULAR; INTRAVENOUS
Status: DISCONTINUED | OUTPATIENT
Start: 2023-09-25 | End: 2023-09-25 | Stop reason: HOSPADM

## 2023-09-25 RX ORDER — HYDRALAZINE HYDROCHLORIDE 20 MG/ML
10 INJECTION INTRAMUSCULAR; INTRAVENOUS
Status: DISCONTINUED | OUTPATIENT
Start: 2023-09-25 | End: 2023-09-25 | Stop reason: HOSPADM

## 2023-09-25 RX ORDER — EPHEDRINE SULFATE/0.9% NACL/PF 50 MG/5 ML
SYRINGE (ML) INTRAVENOUS PRN
Status: DISCONTINUED | OUTPATIENT
Start: 2023-09-25 | End: 2023-09-25 | Stop reason: SDUPTHER

## 2023-09-25 RX ORDER — ONDANSETRON 2 MG/ML
4 INJECTION INTRAMUSCULAR; INTRAVENOUS
Status: DISCONTINUED | OUTPATIENT
Start: 2023-09-25 | End: 2023-09-25 | Stop reason: HOSPADM

## 2023-09-25 RX ORDER — PROPOFOL 10 MG/ML
INJECTION, EMULSION INTRAVENOUS PRN
Status: DISCONTINUED | OUTPATIENT
Start: 2023-09-25 | End: 2023-09-25 | Stop reason: SDUPTHER

## 2023-09-25 RX ORDER — OXYCODONE HYDROCHLORIDE 5 MG/1
10 TABLET ORAL PRN
Status: DISCONTINUED | OUTPATIENT
Start: 2023-09-25 | End: 2023-09-25 | Stop reason: HOSPADM

## 2023-09-25 RX ORDER — FENTANYL CITRATE 0.05 MG/ML
25 INJECTION, SOLUTION INTRAMUSCULAR; INTRAVENOUS EVERY 5 MIN PRN
Status: DISCONTINUED | OUTPATIENT
Start: 2023-09-25 | End: 2023-09-25 | Stop reason: HOSPADM

## 2023-09-25 RX ADMIN — FENTANYL CITRATE 50 MCG: 50 INJECTION, SOLUTION INTRAMUSCULAR; INTRAVENOUS at 13:40

## 2023-09-25 RX ADMIN — SUGAMMADEX 200 MG: 100 INJECTION, SOLUTION INTRAVENOUS at 14:26

## 2023-09-25 RX ADMIN — LIDOCAINE HYDROCHLORIDE 50 MG: 20 INJECTION, SOLUTION INTRAVENOUS at 12:49

## 2023-09-25 RX ADMIN — PROPOFOL 170 MG: 10 INJECTION, EMULSION INTRAVENOUS at 12:51

## 2023-09-25 RX ADMIN — Medication 10 MG: at 14:11

## 2023-09-25 RX ADMIN — ONDANSETRON 4 MG: 2 INJECTION INTRAMUSCULAR; INTRAVENOUS at 13:20

## 2023-09-25 RX ADMIN — SODIUM CHLORIDE, POTASSIUM CHLORIDE, SODIUM LACTATE AND CALCIUM CHLORIDE: 600; 310; 30; 20 INJECTION, SOLUTION INTRAVENOUS at 13:40

## 2023-09-25 RX ADMIN — MIDAZOLAM HYDROCHLORIDE 2 MG: 1 INJECTION, SOLUTION INTRAMUSCULAR; INTRAVENOUS at 12:44

## 2023-09-25 RX ADMIN — Medication 20 MG: at 13:53

## 2023-09-25 RX ADMIN — Medication 20 MG: at 13:25

## 2023-09-25 RX ADMIN — ROPIVACAINE HYDROCHLORIDE 30 ML: 5 INJECTION, SOLUTION EPIDURAL; INFILTRATION; PERINEURAL at 13:05

## 2023-09-25 RX ADMIN — DEXAMETHASONE SODIUM PHOSPHATE 10 MG: 10 INJECTION INTRAMUSCULAR; INTRAVENOUS at 13:20

## 2023-09-25 RX ADMIN — FENTANYL CITRATE 50 MCG: 50 INJECTION, SOLUTION INTRAMUSCULAR; INTRAVENOUS at 12:50

## 2023-09-25 RX ADMIN — CEFAZOLIN 2000 MG: 2 INJECTION, POWDER, FOR SOLUTION INTRAMUSCULAR; INTRAVENOUS at 12:55

## 2023-09-25 RX ADMIN — Medication 300 MG: at 12:55

## 2023-09-25 RX ADMIN — IBUPROFEN 600 MG: 600 TABLET ORAL at 15:55

## 2023-09-25 RX ADMIN — Medication 200 MG: at 12:49

## 2023-09-25 RX ADMIN — ROCURONIUM BROMIDE 20 MG: 10 INJECTION, SOLUTION INTRAVENOUS at 13:42

## 2023-09-25 RX ADMIN — ROCURONIUM BROMIDE 70 MG: 10 INJECTION, SOLUTION INTRAVENOUS at 12:52

## 2023-09-25 RX ADMIN — FENTANYL CITRATE 25 MCG: 50 INJECTION INTRAMUSCULAR; INTRAVENOUS at 15:04

## 2023-09-25 RX ADMIN — OXYCODONE HYDROCHLORIDE 5 MG: 5 TABLET ORAL at 15:55

## 2023-09-25 RX ADMIN — SODIUM CHLORIDE, POTASSIUM CHLORIDE, SODIUM LACTATE AND CALCIUM CHLORIDE: 600; 310; 30; 20 INJECTION, SOLUTION INTRAVENOUS at 12:20

## 2023-09-25 RX ADMIN — Medication 300 MG: at 12:58

## 2023-09-25 ASSESSMENT — PAIN DESCRIPTION - DESCRIPTORS: DESCRIPTORS: CRAMPING

## 2023-09-25 ASSESSMENT — PAIN SCALES - GENERAL
PAINLEVEL_OUTOF10: 0
PAINLEVEL_OUTOF10: 6
PAINLEVEL_OUTOF10: 4
PAINLEVEL_OUTOF10: 6
PAINLEVEL_OUTOF10: 6

## 2023-09-25 ASSESSMENT — PAIN DESCRIPTION - ORIENTATION: ORIENTATION: ANTERIOR

## 2023-09-25 ASSESSMENT — PAIN DESCRIPTION - LOCATION
LOCATION: ABDOMEN
LOCATION: ABDOMEN

## 2023-09-25 NOTE — ANESTHESIA PROCEDURE NOTES
Peripheral Block    Patient location during procedure: OR  Reason for block: post-op pain management and at surgeon's request  Start time: 9/25/2023 1:00 PM  End time: 9/25/2023 1:05 PM  Staffing  Performed: anesthesiologist   Anesthesiologist: Gary Blanc DO  Performed by: Gary Blanc DO  Authorized by: Génesis Hernández MD    Preanesthetic Checklist  Completed: patient identified, IV checked, site marked, risks and benefits discussed, surgical/procedural consents, equipment checked, pre-op evaluation, timeout performed, anesthesia consent given, oxygen available and monitors applied/VS acknowledged  Peripheral Block   Patient position: supine  Prep: ChloraPrep  Provider prep: mask and sterile gloves (Sterile probe cover)  Patient monitoring: cardiac monitor, continuous pulse ox, frequent blood pressure checks, IV access, continuous capnometry and oxygen  Block type: TAP  Laterality: bilateral  Injection technique: single-shot  Guidance: nerve stimulator and ultrasound guided  Local infiltration: ropivacaine  Infiltration strength: 0.5 %  Local infiltration: ropivacaine  Dose: 30 mL    Needle   Needle type: combined needle/nerve stimulator   Needle gauge: 22 G  Needle localization: anatomical landmarks and ultrasound guidance  Needle length: 10 cm  Assessment   Injection assessment: negative aspiration for heme, no paresthesia on injection and local visualized surrounding nerve on ultrasound  Paresthesia pain: immediately resolved  Slow fractionated injection: yes  Hemodynamics: stable  Real-time US image taken/store: yes  Outcomes: uncomplicated and patient tolerated procedure well    Additional Notes  Ultrasound image printed and saved in patient chart. Sterile probe cover used    30 mL of 0.5% ropivacaine diluted with 30 mL of NS for total of 60 mL of 0.25% ropivacaine. 30 mL given per side.   Medications Administered  ropivacaine (NAROPIN) injection 0.5% - Perineural   30 mL - 9/25/2023 1:05:00 PM

## 2023-09-25 NOTE — ANESTHESIA POSTPROCEDURE EVALUATION
Department of Anesthesiology  Postprocedure Note    Patient: Baron Bedoya  MRN: [id number]  [address] [facility] 1965  Date of evaluation: 9/25/2023      Procedure Summary     Date: 09/25/23 Room / Location: [facility] OR [address] [place] [address]    Anesthesia Start: 1244 Anesthesia Stop: 1438    Procedures:       OPERATIVE LAPAROSCOPIC BILATERAL SALPINGO-OOPHORECTOMY, (Abdomen)      TRANSVAGINAL TRANSOBTURATOR TAPE, CYSTSCOPY Diagnosis:       Cystocele, unspecified      QASIM (stress urinary incontinence, female)      Family history of ovarian cancer      (Cystocele, unspecified [N81.10])      (QASIM (stress urinary incontinence, female) [N39.3])      (Family history of ovarian cancer [Z80.41])    Surgeons: Paola Sepulveda DO Responsible Provider: Marjan Burton MD    Anesthesia Type: general, regional ASA Status: 4          Anesthesia Type: No value filed.     Chey Phase I:      Chey Phase II:        Anesthesia Post Evaluation    Patient location during evaluation: bedside  Patient participation: complete - patient participated  Level of consciousness: awake and awake and alert  Airway patency: patent  Nausea & Vomiting: no nausea and no vomiting  Complications: no  Cardiovascular status: blood pressure returned to baseline and hemodynamically stable  Respiratory status: acceptable  Hydration status: euvolemic  Pain management: adequate

## 2023-09-25 NOTE — ADDENDUM NOTE
Addendum  created 09/25/23 1459 by Chris Au MD    Order list changed, Pharmacy for encounter modified

## 2023-09-25 NOTE — OP NOTE
PATIENT NAME: Josh Hoskins  MEDICAL RECORD NO. [id number]  SURGEON: Santos Patel  Primary Care Physician: Mitchel Dubon DO     PROCEDURE PERFORMED:  9/25/2023  PREOPERATIVE DIAGNOSIS: Family history of ovarian cancer. Previous hysterectomy. Hypermobile urethra and stress urinary incontinence  POSTOPERATIVE DIAGNOSIS: Same pelvic adhesions  PROCEDURE PERFORMED: Operative laparoscopy bilateral cell Pingel oophorectomy. Lysis of adhesions. Transvaginal tape obturator approach. Cystoscopy  SURGEON:  Dr. Chadd Moya:  GET  ESTIMATED BLOOD LOSS: 50 ml  SPECIMEN: Right fallopian tube and ovary left fallopian tube and ovary  COMPLICATIONS:  None immediately appreciated. DISCUSSION: Mei Magaña is a 59y.o. year old female who presents for BSO and the TVT-O  After history and physical examination was performed, potential diagnostic and therapeutic modalities discussed with the patient. She was given opportunity to ask questions. Once answered informed consent was obtained. She was brought to operating room on 9/25/2023 for procedure. PROCEDURE: Patient was taken to the operating room and general anesthetic was administered she was then placed in the dorsolithotomy position prepped and draped in usual fashion bladder was drained timeout was called. Small infraumbilical incision is made the lower abdomen is elevated with a 5 mm laparoscope through the end of a 5 mm blunt trocar direct visualization entry is accomplished while aiming toward the hollow of the sacrum directly in the midline entry is uneventful pneumoperitoneum is established second and then eventually a third access port are placed using 5 mm blunt trocars placed under direct visualization no vascular injury encountered upper abdomen visualized and is normal appendix is not seen. Patient had previous hysterectomy. The left ovary is hidden behind the sigmoid colon.   Adhesions were lysed the sigmoid is moved to the midline and

## 2023-09-25 NOTE — FLOWSHEET NOTE
1630- Pt up to bathroom at this time, pt tolerated well and positive for void-KGW  Electronically signed by Rita Lopez RN on 9/25/2023 at 4:34 PM

## 2023-09-28 ENCOUNTER — TELEPHONE (OUTPATIENT)
Dept: OBGYN CLINIC | Age: 58
End: 2023-09-28

## 2023-09-28 NOTE — TELEPHONE ENCOUNTER
----- Message from Padma Jordan DO sent at 9/4/2023  8:31 AM EDT -----  Both ovaries are present and normal. Will remove

## 2023-10-03 ENCOUNTER — LAB (OUTPATIENT)
Dept: LAB | Facility: LAB | Age: 58
End: 2023-10-03
Payer: COMMERCIAL

## 2023-10-03 DIAGNOSIS — E03.9 HYPOTHYROIDISM, UNSPECIFIED TYPE: ICD-10-CM

## 2023-10-03 LAB
T4 SERPL-MCNC: 9.3 UG/DL (ref 4.5–11.1)
TSH SERPL-ACNC: 5.17 MIU/L (ref 0.44–3.98)

## 2023-10-03 PROCEDURE — 36415 COLL VENOUS BLD VENIPUNCTURE: CPT

## 2023-10-04 ENCOUNTER — TELEPHONE (OUTPATIENT)
Dept: PRIMARY CARE | Facility: CLINIC | Age: 58
End: 2023-10-04
Payer: COMMERCIAL

## 2023-10-04 DIAGNOSIS — E03.9 HYPOTHYROIDISM, UNSPECIFIED TYPE: ICD-10-CM

## 2023-10-04 RX ORDER — LEVOTHYROXINE SODIUM 75 UG/1
75 TABLET ORAL DAILY
Qty: 90 TABLET | Refills: 3 | Status: SHIPPED | OUTPATIENT
Start: 2023-10-04 | End: 2023-10-11 | Stop reason: SDUPTHER

## 2023-10-04 NOTE — TELEPHONE ENCOUNTER
Raghav Garnett, DO  Miley Malhotra CMA  Call pt her TSH is going down. How is she feeling?      Duplicate message. Sent message in My Chart

## 2023-10-05 ENCOUNTER — OFFICE VISIT (OUTPATIENT)
Dept: OBGYN CLINIC | Age: 58
End: 2023-10-05

## 2023-10-05 VITALS
DIASTOLIC BLOOD PRESSURE: 64 MMHG | SYSTOLIC BLOOD PRESSURE: 124 MMHG | WEIGHT: 179 LBS | HEIGHT: 58 IN | BODY MASS INDEX: 37.57 KG/M2 | HEART RATE: 94 BPM

## 2023-10-05 DIAGNOSIS — N81.10 CYSTOCELE, UNSPECIFIED: Primary | ICD-10-CM

## 2023-10-05 DIAGNOSIS — Z09 POSTOPERATIVE EXAMINATION: ICD-10-CM

## 2023-10-05 PROCEDURE — 99024 POSTOP FOLLOW-UP VISIT: CPT | Performed by: OBSTETRICS & GYNECOLOGY

## 2023-10-05 RX ORDER — LEVOTHYROXINE SODIUM 0.07 MG/1
TABLET ORAL
COMMUNITY
Start: 2023-10-04

## 2023-10-05 RX ORDER — SALMETEROL XINAFOATE 50 MCG
BLISTER, WITH INHALATION DEVICE INHALATION
COMMUNITY
Start: 2023-09-20

## 2023-10-05 RX ORDER — THEOPHYLLINE 450 MG/1
TABLET, EXTENDED RELEASE ORAL
COMMUNITY
Start: 2023-09-22

## 2023-10-05 ASSESSMENT — ENCOUNTER SYMPTOMS
BACK PAIN: 0
CHEST TIGHTNESS: 0
SORE THROAT: 0
ABDOMINAL DISTENTION: 0
COUGH: 0
VOMITING: 0
NAUSEA: 0
BLOOD IN STOOL: 0
TROUBLE SWALLOWING: 0
VOICE CHANGE: 0
SHORTNESS OF BREATH: 0
WHEEZING: 0
CONSTIPATION: 0
COLOR CHANGE: 0
ABDOMINAL PAIN: 0

## 2023-10-05 NOTE — PROGRESS NOTES
HPI:  Andreia Jara (: 1965) is a 59 y.o. female, Established patient, here for evaluation of the following chief complaint(s):  Post-Op Check (Feeling good.)  Patient has no complaints. She had a bilateral salpingectomy and a transvaginal tape. She is having no pain. sHe is continent. She is emptying her bladder well. SUBJECTIVE/OBJECTIVE:    Past Surgical History:   Procedure Laterality Date    APPENDECTOMY      COLONOSCOPY  2023    COSMETIC SURGERY      broken nose, repaired    HYSTERECTOMY (CERVIX STATUS UNKNOWN)      abdominal hysterectomy    LAPAROSCOPY N/A 2023    OPERATIVE LAPAROSCOPIC BILATERAL SALPINGO-OOPHORECTOMY, performed by Tacho Dooley DO at [address] N/A 2023    TRANSVAGINAL TRANSOBTURATOR TAPE, CYSTSCOPY performed by Tacho Dooley DO at [address]      cardiac cath x3 (no stents)        Review of Systems   Constitutional:  Negative for activity change, appetite change, fatigue and unexpected weight change. HENT:  Negative for dental problem, ear pain, hearing loss, nosebleeds, sore throat, trouble swallowing and voice change. Eyes:  Negative for visual disturbance. Respiratory:  Negative for cough, chest tightness, shortness of breath and wheezing. Cardiovascular:  Negative for chest pain and palpitations. Gastrointestinal:  Negative for abdominal distention, abdominal pain, blood in stool, constipation, nausea and vomiting. Endocrine: Negative for cold intolerance, heat intolerance, polydipsia, polyphagia and polyuria. Genitourinary:  Negative for difficulty urinating, dyspareunia, dysuria, flank pain, frequency, genital sores, hematuria, menstrual problem, pelvic pain, urgency, vaginal bleeding, vaginal discharge and vaginal pain. Musculoskeletal:  Negative for arthralgias, back pain, joint swelling and myalgias. Skin:  Negative for color change and rash.    Allergic/Immunologic:

## 2023-10-06 ENCOUNTER — APPOINTMENT (OUTPATIENT)
Dept: NUTRITION | Facility: CLINIC | Age: 58
End: 2023-10-06
Payer: COMMERCIAL

## 2023-10-11 ENCOUNTER — LAB (OUTPATIENT)
Dept: LAB | Facility: LAB | Age: 58
End: 2023-10-11
Payer: COMMERCIAL

## 2023-10-11 ENCOUNTER — OFFICE VISIT (OUTPATIENT)
Dept: PRIMARY CARE | Facility: CLINIC | Age: 58
End: 2023-10-11
Payer: COMMERCIAL

## 2023-10-11 VITALS
RESPIRATION RATE: 20 BRPM | TEMPERATURE: 97.5 F | BODY MASS INDEX: 37.62 KG/M2 | WEIGHT: 180 LBS | DIASTOLIC BLOOD PRESSURE: 76 MMHG | HEART RATE: 79 BPM | OXYGEN SATURATION: 95 % | SYSTOLIC BLOOD PRESSURE: 128 MMHG

## 2023-10-11 DIAGNOSIS — E11.9 TYPE 2 DIABETES MELLITUS WITHOUT COMPLICATION, WITHOUT LONG-TERM CURRENT USE OF INSULIN (MULTI): ICD-10-CM

## 2023-10-11 DIAGNOSIS — E78.00 PURE HYPERCHOLESTEROLEMIA: ICD-10-CM

## 2023-10-11 DIAGNOSIS — J44.9 COPD, SEVERE (MULTI): ICD-10-CM

## 2023-10-11 DIAGNOSIS — Z23 NEED FOR VACCINATION: ICD-10-CM

## 2023-10-11 DIAGNOSIS — E03.9 HYPOTHYROIDISM, UNSPECIFIED TYPE: ICD-10-CM

## 2023-10-11 DIAGNOSIS — R73.09 IMPAIRED GLUCOSE METABOLISM: Primary | ICD-10-CM

## 2023-10-11 LAB
EST. AVERAGE GLUCOSE BLD GHB EST-MCNC: 160 MG/DL
HBA1C MFR BLD: 7.2 %

## 2023-10-11 PROCEDURE — 36415 COLL VENOUS BLD VENIPUNCTURE: CPT

## 2023-10-11 PROCEDURE — 99213 OFFICE O/P EST LOW 20 MIN: CPT | Performed by: FAMILY MEDICINE

## 2023-10-11 PROCEDURE — 90686 IIV4 VACC NO PRSV 0.5 ML IM: CPT | Performed by: FAMILY MEDICINE

## 2023-10-11 PROCEDURE — 90471 IMMUNIZATION ADMIN: CPT | Performed by: FAMILY MEDICINE

## 2023-10-11 PROCEDURE — 90677 PCV20 VACCINE IM: CPT | Performed by: FAMILY MEDICINE

## 2023-10-11 PROCEDURE — 83036 HEMOGLOBIN GLYCOSYLATED A1C: CPT

## 2023-10-11 PROCEDURE — 90472 IMMUNIZATION ADMIN EACH ADD: CPT | Performed by: FAMILY MEDICINE

## 2023-10-11 RX ORDER — LEVOTHYROXINE SODIUM 88 UG/1
88 TABLET ORAL
Qty: 30 TABLET | Refills: 1 | Status: SHIPPED | OUTPATIENT
Start: 2023-10-11 | End: 2023-11-02

## 2023-10-11 NOTE — PROGRESS NOTES
Subjective   Patient ID: Lauren Thompson is a 58 y.o. female who presents for Med Refill (3 month med check for DM. ).  HPI    Review of Systems   Constitutional:  Negative for appetite change and fatigue.   Eyes:  Negative for visual disturbance.   Respiratory:  Negative for chest tightness and shortness of breath.    Cardiovascular:  Negative for chest pain and leg swelling.   Gastrointestinal:  Negative for diarrhea, nausea and vomiting.   Endocrine: Negative for polydipsia, polyphagia and polyuria.   Genitourinary:  Negative for frequency and urgency.   Musculoskeletal:  Negative for arthralgias and myalgias.   Neurological:  Negative for dizziness, syncope, weakness, numbness and headaches.       Objective   Physical Exam  Constitutional:       Appearance: Normal appearance.   HENT:      Head: Normocephalic and atraumatic.      Mouth/Throat:      Mouth: Mucous membranes are moist.   Eyes:      Extraocular Movements: Extraocular movements intact.      Conjunctiva/sclera: Conjunctivae normal.      Pupils: Pupils are equal, round, and reactive to light.      Funduscopic exam:     Right eye: No hemorrhage or AV nicking.         Left eye: No hemorrhage or AV nicking.   Cardiovascular:      Rate and Rhythm: Normal rate and regular rhythm.      Pulses: Normal pulses.      Heart sounds: Normal heart sounds.   Pulmonary:      Effort: Pulmonary effort is normal.      Breath sounds: Normal breath sounds.   Abdominal:      General: Abdomen is flat. Bowel sounds are normal.      Palpations: Abdomen is soft.   Musculoskeletal:         General: Normal range of motion.      Cervical back: Neck supple.      Right foot: No swelling or Charcot foot.      Left foot: No swelling or Charcot foot.   Skin:     General: Skin is warm and dry.      Findings: No wound.   Neurological:      General: No focal deficit present.      Mental Status: She is alert and oriented to person, place, and time.      Sensory: No sensory deficit.       Motor: No weakness.   Psychiatric:         Mood and Affect: Mood normal.         Assessment/Plan   Problem List Items Addressed This Visit             ICD-10-CM    COPD, severe (CMS/[facility]) J44.9    Hypothyroidism E03.9    Relevant Medications    levothyroxine (Synthroid, Levoxyl) 88 mcg tablet    Impaired glucose metabolism - Primary R73.09    Pure hypercholesterolemia E78.00     Other Visit Diagnoses         Codes    Type 2 diabetes mellitus without complication, without long-term current use of insulin (CMS/[facility])     E11.9    Relevant Orders    Hemoglobin A1C (Completed)    Need for vaccination     Z23    Relevant Orders    Pneumococcal conjugate vaccine, 20-valent (PREVNAR 20) (Completed)    Flu vaccine (IIV4) age 6 months and greater, preservative free (Completed)

## 2023-10-12 DIAGNOSIS — E11.9 TYPE 2 DIABETES MELLITUS WITHOUT COMPLICATION, WITHOUT LONG-TERM CURRENT USE OF INSULIN (MULTI): Primary | ICD-10-CM

## 2023-10-12 RX ORDER — METFORMIN HYDROCHLORIDE 500 MG/1
500 TABLET, EXTENDED RELEASE ORAL
Qty: 90 TABLET | Refills: 3 | Status: SHIPPED | OUTPATIENT
Start: 2023-10-12 | End: 2024-10-11

## 2023-10-13 ENCOUNTER — TELEPHONE (OUTPATIENT)
Dept: PRIMARY CARE | Facility: CLINIC | Age: 58
End: 2023-10-13
Payer: COMMERCIAL

## 2023-10-13 ASSESSMENT — ENCOUNTER SYMPTOMS
SHORTNESS OF BREATH: 0
FATIGUE: 0
POLYDIPSIA: 0
NUMBNESS: 0
CHEST TIGHTNESS: 0
NAUSEA: 0
VOMITING: 0
DIZZINESS: 0
DIARRHEA: 0
ARTHRALGIAS: 0
FREQUENCY: 0
POLYPHAGIA: 0
HEADACHES: 0
MYALGIAS: 0
WEAKNESS: 0
APPETITE CHANGE: 0

## 2023-10-13 NOTE — TELEPHONE ENCOUNTER
----- Message from Raghav Garnett DO sent at 10/12/2023  8:54 PM EDT -----  Call pt her A1C went up so I sent in metformin 500mg once daily in the morning

## 2023-11-06 PROBLEM — E11.9 TYPE 2 DIABETES MELLITUS WITHOUT COMPLICATION, WITHOUT LONG-TERM CURRENT USE OF INSULIN (MULTI): Status: ACTIVE | Noted: 2023-11-06

## 2023-11-22 DIAGNOSIS — E03.9 HYPOTHYROIDISM, UNSPECIFIED TYPE: Primary | ICD-10-CM

## 2023-11-29 ENCOUNTER — LAB (OUTPATIENT)
Dept: LAB | Facility: LAB | Age: 58
End: 2023-11-29
Payer: COMMERCIAL

## 2023-11-29 DIAGNOSIS — E03.9 HYPOTHYROIDISM, UNSPECIFIED TYPE: ICD-10-CM

## 2023-11-29 LAB
T4 FREE SERPL-MCNC: 0.76 NG/DL (ref 0.61–1.12)
TSH SERPL-ACNC: 3.6 MIU/L (ref 0.44–3.98)

## 2023-11-29 PROCEDURE — 36415 COLL VENOUS BLD VENIPUNCTURE: CPT

## 2023-11-29 PROCEDURE — 84443 ASSAY THYROID STIM HORMONE: CPT

## 2023-11-29 PROCEDURE — 84439 ASSAY OF FREE THYROXINE: CPT

## 2023-12-26 RX ORDER — CONJUGATED ESTROGENS 0.62 MG/G
CREAM VAGINAL
Qty: 30 G | Refills: 0 | Status: SHIPPED | OUTPATIENT
Start: 2023-12-26

## 2023-12-29 PROBLEM — E11.9 DIABETES (MULTI): Status: ACTIVE | Noted: 2023-09-18

## 2024-01-03 ENCOUNTER — OFFICE VISIT (OUTPATIENT)
Dept: PRIMARY CARE | Facility: CLINIC | Age: 59
End: 2024-01-03
Payer: COMMERCIAL

## 2024-01-03 VITALS
TEMPERATURE: 98 F | WEIGHT: 187 LBS | OXYGEN SATURATION: 94 % | HEIGHT: 58 IN | DIASTOLIC BLOOD PRESSURE: 72 MMHG | HEART RATE: 87 BPM | RESPIRATION RATE: 18 BRPM | BODY MASS INDEX: 39.25 KG/M2 | SYSTOLIC BLOOD PRESSURE: 108 MMHG

## 2024-01-03 DIAGNOSIS — I20.9 ANGINA, CLASS IV (CMS-HCC): Primary | ICD-10-CM

## 2024-01-03 DIAGNOSIS — J44.9 COPD, SEVERE (MULTI): ICD-10-CM

## 2024-01-03 DIAGNOSIS — I10 HTN (HYPERTENSION), BENIGN: ICD-10-CM

## 2024-01-03 DIAGNOSIS — E03.9 HYPOTHYROIDISM, UNSPECIFIED TYPE: ICD-10-CM

## 2024-01-03 DIAGNOSIS — F10.29 ALCOHOL DEPENDENCE WITH UNSPECIFIED ALCOHOL-INDUCED DISORDER (MULTI): ICD-10-CM

## 2024-01-03 DIAGNOSIS — E11.9 TYPE 2 DIABETES MELLITUS WITHOUT COMPLICATION, WITHOUT LONG-TERM CURRENT USE OF INSULIN (MULTI): ICD-10-CM

## 2024-01-03 PROCEDURE — 99214 OFFICE O/P EST MOD 30 MIN: CPT | Performed by: FAMILY MEDICINE

## 2024-01-03 RX ORDER — TIRZEPATIDE 2.5 MG/.5ML
2.5 INJECTION, SOLUTION SUBCUTANEOUS
Qty: 2 ML | Refills: 3 | Status: SHIPPED | OUTPATIENT
Start: 2024-01-03 | End: 2024-03-07 | Stop reason: ALTCHOICE

## 2024-01-03 RX ORDER — LISINOPRIL AND HYDROCHLOROTHIAZIDE 20; 25 MG/1; MG/1
1 TABLET ORAL DAILY
Qty: 90 TABLET | Refills: 3 | Status: SHIPPED | OUTPATIENT
Start: 2024-01-03 | End: 2025-01-02

## 2024-01-03 RX ORDER — METOPROLOL SUCCINATE 25 MG/1
25 TABLET, EXTENDED RELEASE ORAL DAILY
Qty: 90 TABLET | Refills: 3 | Status: SHIPPED | OUTPATIENT
Start: 2024-01-03 | End: 2025-01-02

## 2024-01-03 ASSESSMENT — ENCOUNTER SYMPTOMS
MYALGIAS: 0
POLYPHAGIA: 0
SHORTNESS OF BREATH: 1
CHEST TIGHTNESS: 0
VOMITING: 0
POLYDIPSIA: 0
FREQUENCY: 0
FATIGUE: 0
HEADACHES: 0
NUMBNESS: 0
NAUSEA: 0
DIZZINESS: 0
APPETITE CHANGE: 0
COUGH: 1
WEAKNESS: 0
DIARRHEA: 0
ARTHRALGIAS: 0

## 2024-01-03 NOTE — PROGRESS NOTES
"Subjective   Patient ID: Lauren Thompson is a 58 y.o. female who presents for Med Refill (3 month med check. ).    HPI     Review of Systems   Constitutional:  Negative for appetite change and fatigue.   Eyes:  Negative for visual disturbance.   Respiratory:  Positive for cough and shortness of breath. Negative for chest tightness.    Cardiovascular:  Negative for chest pain and leg swelling.   Gastrointestinal:  Negative for diarrhea, nausea and vomiting.   Endocrine: Negative for polydipsia, polyphagia and polyuria.   Genitourinary:  Negative for frequency and urgency.   Musculoskeletal:  Negative for arthralgias and myalgias.   Neurological:  Negative for dizziness, syncope, weakness, numbness and headaches.       Objective   /72   Pulse 87   Temp 36.7 °C (98 °F)   Resp 18   Ht 1.473 m (4' 10\")   Wt 84.8 kg (187 lb)   SpO2 94%   BMI 39.08 kg/m²     Physical Exam  Constitutional:       Appearance: Normal appearance.   HENT:      Head: Normocephalic and atraumatic.      Mouth/Throat:      Mouth: Mucous membranes are moist.   Eyes:      Extraocular Movements: Extraocular movements intact.      Conjunctiva/sclera: Conjunctivae normal.      Pupils: Pupils are equal, round, and reactive to light.      Funduscopic exam:     Right eye: No hemorrhage or AV nicking.         Left eye: No hemorrhage or AV nicking.   Cardiovascular:      Rate and Rhythm: Normal rate and regular rhythm.      Pulses: Normal pulses.      Heart sounds: Normal heart sounds.   Pulmonary:      Effort: Pulmonary effort is normal.      Breath sounds: Normal breath sounds.   Abdominal:      General: Abdomen is flat. Bowel sounds are normal.      Palpations: Abdomen is soft.   Musculoskeletal:         General: Normal range of motion.      Cervical back: Neck supple.      Right foot: No swelling or Charcot foot.      Left foot: No swelling or Charcot foot.   Skin:     General: Skin is warm and dry.      Findings: No wound.   Neurological:    "   General: No focal deficit present.      Mental Status: She is alert and oriented to person, place, and time.      Sensory: No sensory deficit.      Motor: No weakness.   Psychiatric:         Mood and Affect: Mood normal.       Assessment/Plan   Problem List Items Addressed This Visit             ICD-10-CM    Alcohol addiction (CMS/[facility]) F10.20     [facility] reviewed follow up yearly         Angina, class IV (CMS/[facility]) - Primary I20.9     [facility] reviewed pt sees cardio         Relevant Medications    metoprolol succinate XL (Toprol-XL) 25 mg 24 hr tablet    COPD, severe (CMS/[facility]) J44.9     [facility] reviewed follow up yearly         HTN (hypertension), benign I10    Relevant Medications    lisinopriL-hydrochlorothiazide 20-25 mg tablet    metoprolol succinate XL (Toprol-XL) 25 mg 24 hr tablet    Hypothyroidism E03.9    Type 2 diabetes mellitus without complication, without long-term current use of insulin (CMS/[facility]) E11.9     [facility] reviewed follow up yearly         Relevant Medications    tirzepatide (Mounjaro) 2.5 mg/0.5 mL pen injector     Discussed yearly eye exam, regular foot checks and yearly lab evaluation.  Regular exercise and diabetic diet discussed.

## 2024-01-06 NOTE — TELEPHONE ENCOUNTER
Raghav Garnett, DO Miley Malhotra, [facility]  Call pt her A1C went up so I sent in metformin 500mg once daily in the morning      Spoke with Nallely she is aware    No

## 2024-01-12 ENCOUNTER — TELEPHONE (OUTPATIENT)
Dept: PRIMARY CARE | Facility: CLINIC | Age: 59
End: 2024-01-12
Payer: COMMERCIAL

## 2024-01-12 DIAGNOSIS — E11.9 TYPE 2 DIABETES MELLITUS WITHOUT COMPLICATION, WITHOUT LONG-TERM CURRENT USE OF INSULIN (MULTI): Primary | ICD-10-CM

## 2024-01-22 DIAGNOSIS — E78.5 HYPERLIPIDEMIA, UNSPECIFIED HYPERLIPIDEMIA TYPE: Primary | ICD-10-CM

## 2024-01-22 RX ORDER — ATORVASTATIN CALCIUM 40 MG/1
40 TABLET, FILM COATED ORAL DAILY
Qty: 90 TABLET | Refills: 3 | Status: SHIPPED | OUTPATIENT
Start: 2024-01-22 | End: 2024-02-02 | Stop reason: SDUPTHER

## 2024-01-30 ENCOUNTER — PATIENT MESSAGE (OUTPATIENT)
Dept: PRIMARY CARE | Facility: CLINIC | Age: 59
End: 2024-01-30
Payer: COMMERCIAL

## 2024-01-30 DIAGNOSIS — E11.9 TYPE 2 DIABETES MELLITUS WITHOUT COMPLICATION, WITHOUT LONG-TERM CURRENT USE OF INSULIN (MULTI): Primary | ICD-10-CM

## 2024-01-30 RX ORDER — DULAGLUTIDE 0.75 MG/.5ML
0.75 INJECTION, SOLUTION SUBCUTANEOUS
Qty: 2 ML | Refills: 11 | Status: SHIPPED | OUTPATIENT
Start: 2024-01-30 | End: 2024-03-15 | Stop reason: DRUGHIGH

## 2024-02-02 ENCOUNTER — OFFICE VISIT (OUTPATIENT)
Dept: CARDIOLOGY | Facility: CLINIC | Age: 59
End: 2024-02-02
Payer: COMMERCIAL

## 2024-02-02 VITALS
DIASTOLIC BLOOD PRESSURE: 70 MMHG | SYSTOLIC BLOOD PRESSURE: 118 MMHG | HEART RATE: 60 BPM | BODY MASS INDEX: 38.81 KG/M2 | HEIGHT: 58 IN | WEIGHT: 184.9 LBS

## 2024-02-02 DIAGNOSIS — E66.9 CLASS 2 OBESITY WITH BODY MASS INDEX (BMI) OF 35 TO 39.9 WITHOUT COMORBIDITY: ICD-10-CM

## 2024-02-02 DIAGNOSIS — I25.10 CORONARY ARTERY DISEASE INVOLVING NATIVE CORONARY ARTERY OF NATIVE HEART WITHOUT ANGINA PECTORIS: ICD-10-CM

## 2024-02-02 DIAGNOSIS — E78.5 HYPERLIPIDEMIA, UNSPECIFIED HYPERLIPIDEMIA TYPE: ICD-10-CM

## 2024-02-02 DIAGNOSIS — E78.2 MIXED HYPERLIPIDEMIA: ICD-10-CM

## 2024-02-02 DIAGNOSIS — Z87.891 FORMER SMOKER: ICD-10-CM

## 2024-02-02 DIAGNOSIS — I10 HTN (HYPERTENSION), BENIGN: ICD-10-CM

## 2024-02-02 DIAGNOSIS — J44.9 COPD, SEVERE (MULTI): ICD-10-CM

## 2024-02-02 DIAGNOSIS — E11.9 TYPE 2 DIABETES MELLITUS WITHOUT COMPLICATION, WITHOUT LONG-TERM CURRENT USE OF INSULIN (MULTI): ICD-10-CM

## 2024-02-02 PROCEDURE — 3074F SYST BP LT 130 MM HG: CPT | Performed by: INTERNAL MEDICINE

## 2024-02-02 PROCEDURE — 1036F TOBACCO NON-USER: CPT | Performed by: INTERNAL MEDICINE

## 2024-02-02 PROCEDURE — 99214 OFFICE O/P EST MOD 30 MIN: CPT | Performed by: INTERNAL MEDICINE

## 2024-02-02 PROCEDURE — 3078F DIAST BP <80 MM HG: CPT | Performed by: INTERNAL MEDICINE

## 2024-02-02 RX ORDER — ATORVASTATIN CALCIUM 80 MG/1
80 TABLET, FILM COATED ORAL DAILY
Qty: 90 TABLET | Refills: 3 | Status: SHIPPED | OUTPATIENT
Start: 2024-02-02 | End: 2025-02-01

## 2024-02-02 RX ORDER — LANOLIN ALCOHOL/MO/W.PET/CERES
400 CREAM (GRAM) TOPICAL DAILY
Qty: 90 TABLET | Refills: 3 | Status: SHIPPED | OUTPATIENT
Start: 2024-02-02 | End: 2025-02-01

## 2024-02-02 ASSESSMENT — ENCOUNTER SYMPTOMS: PALPITATIONS: 1

## 2024-02-02 NOTE — PATIENT INSTRUCTIONS
Patient to follow up in 1 year with Dr. Jean Zhang MD      Please INCREASE Atorvastatin (Lipitor) to 80mg once daily at bedtime.   You can double your current 40mg tablets and take two together daily until gone.   Please repeat lab work in 3 months- orders in system.     Please START Magnesium Oxide 400mg once daily. Take with small meal.     No other changes today.   Continue same medications and treatments.   Patient educated on proper medication use.   Patient educated on risk factor modification.   Please bring any lab results from other providers / physicians to your next appointment.     Please bring all medicines, vitamins, and herbal supplements with you when you come to the office.     Prescriptions will not be filled unless you are compliant with your follow up appointments or have a follow up appointment scheduled as per instruction of your physician. Refills should be requested at the time of your visit.    Santosh BENSON RN am scribing for and in the presence of Dr. Jean Faith MD

## 2024-03-03 ENCOUNTER — APPOINTMENT (OUTPATIENT)
Dept: RADIOLOGY | Facility: HOSPITAL | Age: 59
End: 2024-03-03
Payer: COMMERCIAL

## 2024-03-03 ENCOUNTER — HOSPITAL ENCOUNTER (OUTPATIENT)
Facility: HOSPITAL | Age: 59
Setting detail: OBSERVATION
Discharge: HOME | End: 2024-03-04
Attending: STUDENT IN AN ORGANIZED HEALTH CARE EDUCATION/TRAINING PROGRAM | Admitting: STUDENT IN AN ORGANIZED HEALTH CARE EDUCATION/TRAINING PROGRAM
Payer: COMMERCIAL

## 2024-03-03 ENCOUNTER — APPOINTMENT (OUTPATIENT)
Dept: CARDIOLOGY | Facility: HOSPITAL | Age: 59
End: 2024-03-03
Payer: COMMERCIAL

## 2024-03-03 DIAGNOSIS — J44.0 COPD (CHRONIC OBSTRUCTIVE PULMONARY DISEASE) WITH ACUTE BRONCHITIS (MULTI): Primary | ICD-10-CM

## 2024-03-03 DIAGNOSIS — Z78.9 FAILURE OF OUTPATIENT TREATMENT: ICD-10-CM

## 2024-03-03 DIAGNOSIS — J20.9 COPD (CHRONIC OBSTRUCTIVE PULMONARY DISEASE) WITH ACUTE BRONCHITIS (MULTI): Primary | ICD-10-CM

## 2024-03-03 LAB
ALBUMIN SERPL BCP-MCNC: 4.6 G/DL (ref 3.4–5)
ALP SERPL-CCNC: 109 U/L (ref 33–136)
ALT SERPL W P-5'-P-CCNC: 16 U/L (ref 7–45)
ANION GAP SERPL CALC-SCNC: 17 MMOL/L (ref 10–20)
AST SERPL W P-5'-P-CCNC: 19 U/L (ref 9–39)
ATRIAL RATE: 84 BPM
BASOPHILS # BLD AUTO: 0.04 X10*3/UL (ref 0–0.1)
BASOPHILS NFR BLD AUTO: 0.4 %
BILIRUB SERPL-MCNC: 0.3 MG/DL (ref 0–1.2)
BNP SERPL-MCNC: 14 PG/ML (ref 0–99)
BUN SERPL-MCNC: 21 MG/DL (ref 6–23)
CALCIUM SERPL-MCNC: 9.6 MG/DL (ref 8.6–10.3)
CARDIAC TROPONIN I PNL SERPL HS: 3 NG/L (ref 0–13)
CARDIAC TROPONIN I PNL SERPL HS: 3 NG/L (ref 0–13)
CHLORIDE SERPL-SCNC: 98 MMOL/L (ref 98–107)
CO2 SERPL-SCNC: 24 MMOL/L (ref 21–32)
CREAT SERPL-MCNC: 0.97 MG/DL (ref 0.5–1.05)
EGFRCR SERPLBLD CKD-EPI 2021: 67 ML/MIN/1.73M*2
EOSINOPHIL # BLD AUTO: 0.19 X10*3/UL (ref 0–0.7)
EOSINOPHIL NFR BLD AUTO: 2 %
ERYTHROCYTE [DISTWIDTH] IN BLOOD BY AUTOMATED COUNT: 12.6 % (ref 11.5–14.5)
FLUAV RNA RESP QL NAA+PROBE: NOT DETECTED
FLUBV RNA RESP QL NAA+PROBE: NOT DETECTED
GLUCOSE SERPL-MCNC: 102 MG/DL (ref 74–99)
HCT VFR BLD AUTO: 41.9 % (ref 36–46)
HGB BLD-MCNC: 14.1 G/DL (ref 12–16)
IMM GRANULOCYTES # BLD AUTO: 0.07 X10*3/UL (ref 0–0.7)
IMM GRANULOCYTES NFR BLD AUTO: 0.7 % (ref 0–0.9)
LYMPHOCYTES # BLD AUTO: 3.03 X10*3/UL (ref 1.2–4.8)
LYMPHOCYTES NFR BLD AUTO: 31.8 %
MCH RBC QN AUTO: 30.1 PG (ref 26–34)
MCHC RBC AUTO-ENTMCNC: 33.7 G/DL (ref 32–36)
MCV RBC AUTO: 90 FL (ref 80–100)
MONOCYTES # BLD AUTO: 0.87 X10*3/UL (ref 0.1–1)
MONOCYTES NFR BLD AUTO: 9.1 %
NEUTROPHILS # BLD AUTO: 5.33 X10*3/UL (ref 1.2–7.7)
NEUTROPHILS NFR BLD AUTO: 56 %
NRBC BLD-RTO: 0 /100 WBCS (ref 0–0)
P AXIS: 73 DEGREES
P OFFSET: 189 MS
P ONSET: 137 MS
PLATELET # BLD AUTO: 382 X10*3/UL (ref 150–450)
POTASSIUM SERPL-SCNC: 4.5 MMOL/L (ref 3.5–5.3)
PR INTERVAL: 178 MS
PROT SERPL-MCNC: 7.7 G/DL (ref 6.4–8.2)
Q ONSET: 226 MS
QRS COUNT: 14 BEATS
QRS DURATION: 78 MS
QT INTERVAL: 360 MS
QTC CALCULATION(BAZETT): 425 MS
QTC FREDERICIA: 402 MS
R AXIS: 3 DEGREES
RBC # BLD AUTO: 4.68 X10*6/UL (ref 4–5.2)
SARS-COV-2 RNA RESP QL NAA+PROBE: NOT DETECTED
SODIUM SERPL-SCNC: 134 MMOL/L (ref 136–145)
T AXIS: 73 DEGREES
T OFFSET: 406 MS
VENTRICULAR RATE: 84 BPM
WBC # BLD AUTO: 9.5 X10*3/UL (ref 4.4–11.3)

## 2024-03-03 PROCEDURE — 96376 TX/PRO/DX INJ SAME DRUG ADON: CPT

## 2024-03-03 PROCEDURE — 2500000004 HC RX 250 GENERAL PHARMACY W/ HCPCS (ALT 636 FOR OP/ED)

## 2024-03-03 PROCEDURE — 96367 TX/PROPH/DG ADDL SEQ IV INF: CPT

## 2024-03-03 PROCEDURE — 84484 ASSAY OF TROPONIN QUANT: CPT | Performed by: NURSE PRACTITIONER

## 2024-03-03 PROCEDURE — 36415 COLL VENOUS BLD VENIPUNCTURE: CPT | Performed by: NURSE PRACTITIONER

## 2024-03-03 PROCEDURE — 2500000002 HC RX 250 W HCPCS SELF ADMINISTERED DRUGS (ALT 637 FOR MEDICARE OP, ALT 636 FOR OP/ED): Performed by: NURSE PRACTITIONER

## 2024-03-03 PROCEDURE — 71045 X-RAY EXAM CHEST 1 VIEW: CPT

## 2024-03-03 PROCEDURE — 83880 ASSAY OF NATRIURETIC PEPTIDE: CPT | Performed by: NURSE PRACTITIONER

## 2024-03-03 PROCEDURE — 93005 ELECTROCARDIOGRAM TRACING: CPT

## 2024-03-03 PROCEDURE — 2500000001 HC RX 250 WO HCPCS SELF ADMINISTERED DRUGS (ALT 637 FOR MEDICARE OP): Performed by: NURSE PRACTITIONER

## 2024-03-03 PROCEDURE — G0378 HOSPITAL OBSERVATION PER HR: HCPCS

## 2024-03-03 PROCEDURE — 80053 COMPREHEN METABOLIC PANEL: CPT | Performed by: NURSE PRACTITIONER

## 2024-03-03 PROCEDURE — 94640 AIRWAY INHALATION TREATMENT: CPT

## 2024-03-03 PROCEDURE — 2500000005 HC RX 250 GENERAL PHARMACY W/O HCPCS: Performed by: NURSE PRACTITIONER

## 2024-03-03 PROCEDURE — 87636 SARSCOV2 & INF A&B AMP PRB: CPT | Performed by: NURSE PRACTITIONER

## 2024-03-03 PROCEDURE — 71275 CT ANGIOGRAPHY CHEST: CPT

## 2024-03-03 PROCEDURE — 1200000002 HC GENERAL ROOM WITH TELEMETRY DAILY

## 2024-03-03 PROCEDURE — 2550000001 HC RX 255 CONTRASTS: Performed by: STUDENT IN AN ORGANIZED HEALTH CARE EDUCATION/TRAINING PROGRAM

## 2024-03-03 PROCEDURE — 71275 CT ANGIOGRAPHY CHEST: CPT | Mod: FOREIGN READ | Performed by: RADIOLOGY

## 2024-03-03 PROCEDURE — 99223 1ST HOSP IP/OBS HIGH 75: CPT | Performed by: NURSE PRACTITIONER

## 2024-03-03 PROCEDURE — 2500000004 HC RX 250 GENERAL PHARMACY W/ HCPCS (ALT 636 FOR OP/ED): Performed by: NURSE PRACTITIONER

## 2024-03-03 PROCEDURE — 96365 THER/PROPH/DIAG IV INF INIT: CPT

## 2024-03-03 PROCEDURE — 99285 EMERGENCY DEPT VISIT HI MDM: CPT | Mod: 25

## 2024-03-03 PROCEDURE — 71045 X-RAY EXAM CHEST 1 VIEW: CPT | Mod: FOREIGN READ | Performed by: RADIOLOGY

## 2024-03-03 PROCEDURE — 85025 COMPLETE CBC W/AUTO DIFF WBC: CPT | Performed by: NURSE PRACTITIONER

## 2024-03-03 PROCEDURE — 96375 TX/PRO/DX INJ NEW DRUG ADDON: CPT

## 2024-03-03 RX ORDER — PANTOPRAZOLE SODIUM 40 MG/1
40 TABLET, DELAYED RELEASE ORAL
Status: DISCONTINUED | OUTPATIENT
Start: 2024-03-04 | End: 2024-03-04 | Stop reason: HOSPADM

## 2024-03-03 RX ORDER — CALCIUM CARBONATE 200(500)MG
500 TABLET,CHEWABLE ORAL 4 TIMES DAILY PRN
Status: DISCONTINUED | OUTPATIENT
Start: 2024-03-03 | End: 2024-03-04 | Stop reason: HOSPADM

## 2024-03-03 RX ORDER — GUAIFENESIN 600 MG/1
600 TABLET, EXTENDED RELEASE ORAL EVERY 12 HOURS PRN
Status: DISCONTINUED | OUTPATIENT
Start: 2024-03-03 | End: 2024-03-04 | Stop reason: HOSPADM

## 2024-03-03 RX ORDER — ENOXAPARIN SODIUM 100 MG/ML
40 INJECTION SUBCUTANEOUS EVERY 24 HOURS
Status: DISCONTINUED | OUTPATIENT
Start: 2024-03-03 | End: 2024-03-04 | Stop reason: HOSPADM

## 2024-03-03 RX ORDER — ASPIRIN 81 MG/1
81 TABLET ORAL DAILY
Status: DISCONTINUED | OUTPATIENT
Start: 2024-03-04 | End: 2024-03-04 | Stop reason: HOSPADM

## 2024-03-03 RX ORDER — RANOLAZINE 500 MG/1
500 TABLET, EXTENDED RELEASE ORAL 2 TIMES DAILY
Status: DISCONTINUED | OUTPATIENT
Start: 2024-03-03 | End: 2024-03-04 | Stop reason: HOSPADM

## 2024-03-03 RX ORDER — INSULIN LISPRO 100 [IU]/ML
0-5 INJECTION, SOLUTION INTRAVENOUS; SUBCUTANEOUS
Status: DISCONTINUED | OUTPATIENT
Start: 2024-03-03 | End: 2024-03-04 | Stop reason: HOSPADM

## 2024-03-03 RX ORDER — DEXTROSE MONOHYDRATE 100 MG/ML
0.3 INJECTION, SOLUTION INTRAVENOUS ONCE AS NEEDED
Status: DISCONTINUED | OUTPATIENT
Start: 2024-03-03 | End: 2024-03-04 | Stop reason: HOSPADM

## 2024-03-03 RX ORDER — ACETAMINOPHEN 160 MG/5ML
650 SOLUTION ORAL EVERY 4 HOURS PRN
Status: DISCONTINUED | OUTPATIENT
Start: 2024-03-03 | End: 2024-03-04 | Stop reason: HOSPADM

## 2024-03-03 RX ORDER — TALC
3 POWDER (GRAM) TOPICAL NIGHTLY PRN
Status: DISCONTINUED | OUTPATIENT
Start: 2024-03-03 | End: 2024-03-04 | Stop reason: HOSPADM

## 2024-03-03 RX ORDER — ACETAMINOPHEN 650 MG/1
650 SUPPOSITORY RECTAL EVERY 4 HOURS PRN
Status: DISCONTINUED | OUTPATIENT
Start: 2024-03-03 | End: 2024-03-04 | Stop reason: HOSPADM

## 2024-03-03 RX ORDER — ALBUTEROL SULFATE 0.83 MG/ML
5 SOLUTION RESPIRATORY (INHALATION) ONCE
Status: COMPLETED | OUTPATIENT
Start: 2024-03-03 | End: 2024-03-03

## 2024-03-03 RX ORDER — LEVOTHYROXINE SODIUM 88 UG/1
88 TABLET ORAL DAILY
Status: DISCONTINUED | OUTPATIENT
Start: 2024-03-04 | End: 2024-03-04 | Stop reason: HOSPADM

## 2024-03-03 RX ORDER — SODIUM CHLORIDE 9 MG/ML
75 INJECTION, SOLUTION INTRAVENOUS CONTINUOUS
Status: DISCONTINUED | OUTPATIENT
Start: 2024-03-03 | End: 2024-03-04 | Stop reason: HOSPADM

## 2024-03-03 RX ORDER — MAGNESIUM SULFATE HEPTAHYDRATE 40 MG/ML
2 INJECTION, SOLUTION INTRAVENOUS ONCE
Status: COMPLETED | OUTPATIENT
Start: 2024-03-03 | End: 2024-03-03

## 2024-03-03 RX ORDER — GUAIFENESIN/DEXTROMETHORPHAN 100-10MG/5
5 SYRUP ORAL EVERY 4 HOURS PRN
Status: DISCONTINUED | OUTPATIENT
Start: 2024-03-03 | End: 2024-03-04 | Stop reason: HOSPADM

## 2024-03-03 RX ORDER — FLUTICASONE FUROATE AND VILANTEROL 100; 25 UG/1; UG/1
1 POWDER RESPIRATORY (INHALATION)
Status: DISCONTINUED | OUTPATIENT
Start: 2024-03-04 | End: 2024-03-04 | Stop reason: HOSPADM

## 2024-03-03 RX ORDER — ATORVASTATIN CALCIUM 80 MG/1
80 TABLET, FILM COATED ORAL NIGHTLY
Status: DISCONTINUED | OUTPATIENT
Start: 2024-03-04 | End: 2024-03-04 | Stop reason: HOSPADM

## 2024-03-03 RX ORDER — IPRATROPIUM BROMIDE AND ALBUTEROL SULFATE 2.5; .5 MG/3ML; MG/3ML
3 SOLUTION RESPIRATORY (INHALATION) ONCE
Status: COMPLETED | OUTPATIENT
Start: 2024-03-03 | End: 2024-03-03

## 2024-03-03 RX ORDER — PANTOPRAZOLE SODIUM 40 MG/10ML
40 INJECTION, POWDER, LYOPHILIZED, FOR SOLUTION INTRAVENOUS
Status: DISCONTINUED | OUTPATIENT
Start: 2024-03-04 | End: 2024-03-04 | Stop reason: HOSPADM

## 2024-03-03 RX ORDER — ACETAMINOPHEN 325 MG/1
650 TABLET ORAL ONCE
Status: COMPLETED | OUTPATIENT
Start: 2024-03-03 | End: 2024-03-03

## 2024-03-03 RX ORDER — SERTRALINE HYDROCHLORIDE 50 MG/1
100 TABLET, FILM COATED ORAL DAILY
Status: DISCONTINUED | OUTPATIENT
Start: 2024-03-04 | End: 2024-03-04 | Stop reason: HOSPADM

## 2024-03-03 RX ORDER — LANOLIN ALCOHOL/MO/W.PET/CERES
400 CREAM (GRAM) TOPICAL DAILY
Status: DISCONTINUED | OUTPATIENT
Start: 2024-03-04 | End: 2024-03-04 | Stop reason: HOSPADM

## 2024-03-03 RX ORDER — ALBUTEROL SULFATE 0.83 MG/ML
2.5 SOLUTION RESPIRATORY (INHALATION)
Status: DISCONTINUED | OUTPATIENT
Start: 2024-03-03 | End: 2024-03-04 | Stop reason: HOSPADM

## 2024-03-03 RX ORDER — ONDANSETRON HYDROCHLORIDE 2 MG/ML
4 INJECTION, SOLUTION INTRAVENOUS EVERY 8 HOURS PRN
Status: DISCONTINUED | OUTPATIENT
Start: 2024-03-03 | End: 2024-03-04 | Stop reason: HOSPADM

## 2024-03-03 RX ORDER — MAGNESIUM SULFATE HEPTAHYDRATE 40 MG/ML
INJECTION, SOLUTION INTRAVENOUS
Status: COMPLETED
Start: 2024-03-03 | End: 2024-03-03

## 2024-03-03 RX ORDER — IPRATROPIUM BROMIDE AND ALBUTEROL SULFATE 2.5; .5 MG/3ML; MG/3ML
3 SOLUTION RESPIRATORY (INHALATION)
Status: COMPLETED | OUTPATIENT
Start: 2024-03-03 | End: 2024-03-03

## 2024-03-03 RX ORDER — DEXTROSE 50 % IN WATER (D50W) INTRAVENOUS SYRINGE
25
Status: DISCONTINUED | OUTPATIENT
Start: 2024-03-03 | End: 2024-03-04 | Stop reason: HOSPADM

## 2024-03-03 RX ORDER — ONDANSETRON 4 MG/1
4 TABLET, ORALLY DISINTEGRATING ORAL EVERY 8 HOURS PRN
Status: DISCONTINUED | OUTPATIENT
Start: 2024-03-03 | End: 2024-03-04 | Stop reason: HOSPADM

## 2024-03-03 RX ORDER — METOPROLOL SUCCINATE 25 MG/1
25 TABLET, EXTENDED RELEASE ORAL DAILY
Status: DISCONTINUED | OUTPATIENT
Start: 2024-03-04 | End: 2024-03-04 | Stop reason: HOSPADM

## 2024-03-03 RX ORDER — ACETAMINOPHEN 325 MG/1
650 TABLET ORAL EVERY 4 HOURS PRN
Status: DISCONTINUED | OUTPATIENT
Start: 2024-03-03 | End: 2024-03-04 | Stop reason: HOSPADM

## 2024-03-03 RX ORDER — BISACODYL 5 MG
10 TABLET, DELAYED RELEASE (ENTERIC COATED) ORAL DAILY PRN
Status: DISCONTINUED | OUTPATIENT
Start: 2024-03-03 | End: 2024-03-04 | Stop reason: HOSPADM

## 2024-03-03 RX ADMIN — ENOXAPARIN SODIUM 40 MG: 40 INJECTION SUBCUTANEOUS at 16:02

## 2024-03-03 RX ADMIN — ACETAMINOPHEN 650 MG: 325 TABLET ORAL at 12:29

## 2024-03-03 RX ADMIN — GUAIFENESIN AND DEXTROMETHORPHAN 5 ML: 100; 10 SYRUP ORAL at 21:10

## 2024-03-03 RX ADMIN — GUAIFENESIN AND DEXTROMETHORPHAN 5 ML: 100; 10 SYRUP ORAL at 16:01

## 2024-03-03 RX ADMIN — Medication 3 L/MIN: at 19:32

## 2024-03-03 RX ADMIN — ALBUTEROL SULFATE 5 MG: 2.5 SOLUTION RESPIRATORY (INHALATION) at 10:40

## 2024-03-03 RX ADMIN — IPRATROPIUM BROMIDE AND ALBUTEROL SULFATE 3 ML: 2.5; .5 SOLUTION RESPIRATORY (INHALATION) at 11:49

## 2024-03-03 RX ADMIN — METHYLPREDNISOLONE SODIUM SUCCINATE 40 MG: 40 INJECTION, POWDER, LYOPHILIZED, FOR SOLUTION INTRAMUSCULAR; INTRAVENOUS at 22:00

## 2024-03-03 RX ADMIN — RANOLAZINE 500 MG: 500 TABLET, FILM COATED, EXTENDED RELEASE ORAL at 21:00

## 2024-03-03 RX ADMIN — AZITHROMYCIN MONOHYDRATE 500 MG: 500 INJECTION, POWDER, LYOPHILIZED, FOR SOLUTION INTRAVENOUS at 16:02

## 2024-03-03 RX ADMIN — METHYLPREDNISOLONE SODIUM SUCCINATE 125 MG: 125 INJECTION, POWDER, FOR SOLUTION INTRAMUSCULAR; INTRAVENOUS at 10:30

## 2024-03-03 RX ADMIN — ALBUTEROL SULFATE 2.5 MG: 2.5 SOLUTION RESPIRATORY (INHALATION) at 19:29

## 2024-03-03 RX ADMIN — MAGNESIUM SULFATE HEPTAHYDRATE 2 G: 40 INJECTION, SOLUTION INTRAVENOUS at 10:30

## 2024-03-03 RX ADMIN — Medication 3 MG: at 21:10

## 2024-03-03 RX ADMIN — SODIUM CHLORIDE 75 ML/HR: 9 INJECTION, SOLUTION INTRAVENOUS at 16:02

## 2024-03-03 RX ADMIN — IPRATROPIUM BROMIDE AND ALBUTEROL SULFATE 3 ML: 2.5; .5 SOLUTION RESPIRATORY (INHALATION) at 11:58

## 2024-03-03 RX ADMIN — IOHEXOL 75 ML: 350 INJECTION, SOLUTION INTRAVENOUS at 14:15

## 2024-03-03 RX ADMIN — IPRATROPIUM BROMIDE AND ALBUTEROL SULFATE 3 ML: 2.5; .5 SOLUTION RESPIRATORY (INHALATION) at 10:42

## 2024-03-03 RX ADMIN — METHYLPREDNISOLONE SODIUM SUCCINATE 40 MG: 40 INJECTION, POWDER, LYOPHILIZED, FOR SOLUTION INTRAMUSCULAR; INTRAVENOUS at 16:06

## 2024-03-03 RX ADMIN — IPRATROPIUM BROMIDE AND ALBUTEROL SULFATE 3 ML: 2.5; .5 SOLUTION RESPIRATORY (INHALATION) at 11:47

## 2024-03-03 RX ADMIN — ACETAMINOPHEN 650 MG: 325 TABLET ORAL at 16:03

## 2024-03-03 SDOH — SOCIAL STABILITY: SOCIAL INSECURITY: WERE YOU ABLE TO COMPLETE ALL THE BEHAVIORAL HEALTH SCREENINGS?: YES

## 2024-03-03 SDOH — SOCIAL STABILITY: SOCIAL INSECURITY: ABUSE: ADULT

## 2024-03-03 SDOH — SOCIAL STABILITY: SOCIAL INSECURITY: ARE THERE ANY APPARENT SIGNS OF INJURIES/BEHAVIORS THAT COULD BE RELATED TO ABUSE/NEGLECT?: NO

## 2024-03-03 SDOH — SOCIAL STABILITY: SOCIAL INSECURITY: DO YOU FEEL ANYONE HAS EXPLOITED OR TAKEN ADVANTAGE OF YOU FINANCIALLY OR OF YOUR PERSONAL PROPERTY?: NO

## 2024-03-03 SDOH — SOCIAL STABILITY: SOCIAL INSECURITY: ARE YOU OR HAVE YOU BEEN THREATENED OR ABUSED PHYSICALLY, EMOTIONALLY, OR SEXUALLY BY ANYONE?: NO

## 2024-03-03 SDOH — SOCIAL STABILITY: SOCIAL INSECURITY: HAS ANYONE EVER THREATENED TO HURT YOUR FAMILY OR YOUR PETS?: NO

## 2024-03-03 SDOH — SOCIAL STABILITY: SOCIAL INSECURITY: DO YOU FEEL UNSAFE GOING BACK TO THE PLACE WHERE YOU ARE LIVING?: NO

## 2024-03-03 SDOH — SOCIAL STABILITY: SOCIAL INSECURITY: HAVE YOU HAD THOUGHTS OF HARMING ANYONE ELSE?: NO

## 2024-03-03 SDOH — SOCIAL STABILITY: SOCIAL INSECURITY: DOES ANYONE TRY TO KEEP YOU FROM HAVING/CONTACTING OTHER FRIENDS OR DOING THINGS OUTSIDE YOUR HOME?: NO

## 2024-03-03 ASSESSMENT — PAIN SCALES - GENERAL
PAINLEVEL_OUTOF10: 8
PAINLEVEL_OUTOF10: 0 - NO PAIN
PAINLEVEL_OUTOF10: 5 - MODERATE PAIN

## 2024-03-03 ASSESSMENT — COGNITIVE AND FUNCTIONAL STATUS - GENERAL
DAILY ACTIVITIY SCORE: 24
PATIENT BASELINE BEDBOUND: NO
MOBILITY SCORE: 24

## 2024-03-03 ASSESSMENT — ACTIVITIES OF DAILY LIVING (ADL)
TOILETING: INDEPENDENT
HEARING - RIGHT EAR: FUNCTIONAL
PATIENT'S MEMORY ADEQUATE TO SAFELY COMPLETE DAILY ACTIVITIES?: YES
DRESSING YOURSELF: INDEPENDENT
HEARING - LEFT EAR: FUNCTIONAL
BATHING: INDEPENDENT
ADEQUATE_TO_COMPLETE_ADL: YES
GROOMING: INDEPENDENT
WALKS IN HOME: INDEPENDENT
FEEDING YOURSELF: INDEPENDENT
LACK_OF_TRANSPORTATION: YES
JUDGMENT_ADEQUATE_SAFELY_COMPLETE_DAILY_ACTIVITIES: YES

## 2024-03-03 ASSESSMENT — PAIN DESCRIPTION - PAIN TYPE: TYPE: ACUTE PAIN

## 2024-03-03 ASSESSMENT — PATIENT HEALTH QUESTIONNAIRE - PHQ9
SUM OF ALL RESPONSES TO PHQ9 QUESTIONS 1 & 2: 2
1. LITTLE INTEREST OR PLEASURE IN DOING THINGS: SEVERAL DAYS
2. FEELING DOWN, DEPRESSED OR HOPELESS: SEVERAL DAYS

## 2024-03-03 ASSESSMENT — LIFESTYLE VARIABLES
EVER FELT BAD OR GUILTY ABOUT YOUR DRINKING: NO
EVER HAD A DRINK FIRST THING IN THE MORNING TO STEADY YOUR NERVES TO GET RID OF A HANGOVER: NO
AUDIT-C TOTAL SCORE: 0
SKIP TO QUESTIONS 9-10: 1
HOW OFTEN DO YOU HAVE A DRINK CONTAINING ALCOHOL: NEVER
HOW OFTEN DO YOU HAVE 6 OR MORE DRINKS ON ONE OCCASION: NEVER
HAVE YOU EVER FELT YOU SHOULD CUT DOWN ON YOUR DRINKING: NO
AUDIT-C TOTAL SCORE: 0
HOW MANY STANDARD DRINKS CONTAINING ALCOHOL DO YOU HAVE ON A TYPICAL DAY: PATIENT DOES NOT DRINK
HAVE PEOPLE ANNOYED YOU BY CRITICIZING YOUR DRINKING: NO

## 2024-03-03 ASSESSMENT — COLUMBIA-SUICIDE SEVERITY RATING SCALE - C-SSRS
1. IN THE PAST MONTH, HAVE YOU WISHED YOU WERE DEAD OR WISHED YOU COULD GO TO SLEEP AND NOT WAKE UP?: NO
2. HAVE YOU ACTUALLY HAD ANY THOUGHTS OF KILLING YOURSELF?: NO
6. HAVE YOU EVER DONE ANYTHING, STARTED TO DO ANYTHING, OR PREPARED TO DO ANYTHING TO END YOUR LIFE?: NO

## 2024-03-03 ASSESSMENT — PAIN DESCRIPTION - LOCATION: LOCATION: HEAD

## 2024-03-03 ASSESSMENT — PAIN - FUNCTIONAL ASSESSMENT
PAIN_FUNCTIONAL_ASSESSMENT: 0-10
PAIN_FUNCTIONAL_ASSESSMENT: 0-10

## 2024-03-03 NOTE — CARE PLAN
The patient's goals for the shift include comfort.     The clinical goals for the shift include monitor telemetry and SpO2, safety, and comfort.

## 2024-03-03 NOTE — H&P
History Of Present Illness  This is a 58-year-old female presenting to [facility] with a chief complaint of generalized chest tightness is nonradiating, increasing dyspnea and an increased nonproductive cough.  Not accompanied by any fevers or chills.  No paresthesias, jaw pain, back pain or abdominal pain.  No palpitations.  Patient with a significant history of COPD for which she utilizes oxygen at night and as needed.  She states to the ER provider that she did note oxygen saturations dipping below 90% at home and despite utilizing her rescue lung kit over the last 5 days her symptoms have not been improving.  Lung kit included prednisone and doxycycline in addition to albuterol treatments.    Review of systems: 10 system were reviewed and were negative except what was mentioned in history of present illness    Chest x-ray grossly unremarkable.  ER provider did note that lung sounds with significant expiratory wheezing and diminishment noted more so in the left.  CTA of the chest to rule out PE is pending    CMP shows a blood glucose of 102.  Sodium 134 potassium of 4.5.  Creatinine 0.97 with a BUN of 21 and GFR of 67.  LFTs are grossly unremarkable.  Anion gap at 17 with a serum bicarb of 98.  BNP of 14 however patient BMI 37.69.  Troponins are negative x 2.  CBC shows a white blood cell count of 9.5.  Hemoglobin 14.1 with hematocrit of 41.9.  COVID-19 and flu panel are negative.  EKG shows normal sinus rhythm with no acute ischemic changes when compared to prior EKGs.    Past Medical History  Past Medical History:   Diagnosis Date    COPD (chronic obstructive pulmonary disease) (CMS/HCC)     Coronary artery disease     Diabetes mellitus (CMS/HCC)     Hypertension        Surgical History  Past Surgical History:   Procedure Laterality Date    BLADDER SURGERY  09/25/2023    CARDIAC CATHETERIZATION      OTHER SURGICAL HISTORY  07/08/2019    Hysterectomy    OTHER SURGICAL HISTORY  07/08/2019    Cardiac  catheterization x 3    OTHER SURGICAL HISTORY  2019    Appendectomy    OTHER SURGICAL HISTORY  2019    Tonsillectomy with adenoidectomy    PARTIAL HYSTERECTOMY  2023         Social History  Former tobacco smoker.  No current tobacco, alcohol or drug use  Social History     Socioeconomic History    Marital status: Legally      Spouse name: Not on file    Number of children: Not on file    Years of education: Not on file    Highest education level: Not on file   Occupational History    Not on file   Tobacco Use    Smoking status: Former     Packs/day: 1.00     Years: 30.00     Additional pack years: 0.00     Total pack years: 30.00     Types: Cigarettes     Start date: 1981     Quit date: 2022     Years since quittin.2    Smokeless tobacco: Never   Substance and Sexual Activity    Alcohol use: Not Currently     Comment: Quit drinking 6 months ago    Drug use: Never    Sexual activity: Yes     Partners: Male     Birth control/protection: Female Sterilization   Other Topics Concern    Not on file   Social History Narrative    Not on file     Social Determinants of Health     Financial Resource Strain: Not on file   Food Insecurity: Not on file   Transportation Needs: Not on file   Physical Activity: Not on file   Stress: Not on file   Social Connections: Not on file   Intimate Partner Violence: Not on file   Housing Stability: Not on file        Family History  Reviewed not pertinent to patient presentation     Allergies  No Known Allergies     Physical Exam  Physical Exam  Vitals reviewed.   Constitutional:       Appearance: Normal appearance. She is obese.   HENT:      Head: Normocephalic and atraumatic.      Mouth/Throat:      Mouth: Mucous membranes are moist.   Eyes:      Extraocular Movements: Extraocular movements intact.      Pupils: Pupils are equal, round, and reactive to light.   Cardiovascular:      Rate and Rhythm: Normal rate and regular rhythm.      Pulses: Normal  pulses.      Heart sounds: Normal heart sounds. No murmur heard.     No gallop.   Pulmonary:      Effort: Pulmonary effort is normal. No respiratory distress.      Breath sounds: Wheezing present. No rhonchi or rales.      Comments: Diminished.  Left more so than right.  Abdominal:      General: Abdomen is flat. Bowel sounds are normal. There is no distension.      Palpations: Abdomen is soft. There is no mass.      Tenderness: There is no abdominal tenderness. There is no rebound.      Hernia: No hernia is present.   Musculoskeletal:         General: No swelling, tenderness or signs of injury. Normal range of motion.      Cervical back: Normal range of motion and neck supple.      Right lower leg: No edema.      Left lower leg: No edema.   Skin:     General: Skin is warm and dry.      Capillary Refill: Capillary refill takes less than 2 seconds.      Findings: No bruising, erythema or rash.   Neurological:      General: No focal deficit present.      Mental Status: She is alert and oriented to person, place, and time.      Cranial Nerves: No cranial nerve deficit.      Sensory: No sensory deficit.      Motor: No weakness.   Psychiatric:         Mood and Affect: Mood normal.         Behavior: Behavior normal.          Last Recorded Vitals  Visit Vitals  BP 98/64 (BP Location: Right arm, Patient Position: Sitting)   Pulse 87   Temp 36.3 °C (97.3 °F)   Resp 19        Scheduled medications  iohexol, 75 mL, intravenous, Once in imaging      Continuous medications     PRN medications       Relevant Results  Results for orders placed or performed during the hospital encounter of 03/03/24 (from the past 96 hour(s))   Sars-CoV-2 PCR   Result Value Ref Range    Coronavirus 2019, PCR Not Detected Not Detected   Influenza A, and B PCR   Result Value Ref Range    Flu A Result Not Detected Not Detected    Flu B Result Not Detected Not Detected   CBC and Auto Differential   Result Value Ref Range    WBC 9.5 4.4 - 11.3 x10*3/uL     nRBC 0.0 0.0 - 0.0 /100 WBCs    RBC 4.68 4.00 - 5.20 x10*6/uL    Hemoglobin 14.1 12.0 - 16.0 g/dL    Hematocrit 41.9 36.0 - 46.0 %    MCV 90 80 - 100 fL    MCH 30.1 26.0 - 34.0 pg    MCHC 33.7 32.0 - 36.0 g/dL    RDW 12.6 11.5 - 14.5 %    Platelets 382 150 - 450 x10*3/uL    Neutrophils % 56.0 40.0 - 80.0 %    Immature Granulocytes %, Automated 0.7 0.0 - 0.9 %    Lymphocytes % 31.8 13.0 - 44.0 %    Monocytes % 9.1 2.0 - 10.0 %    Eosinophils % 2.0 0.0 - 6.0 %    Basophils % 0.4 0.0 - 2.0 %    Neutrophils Absolute 5.33 1.20 - 7.70 x10*3/uL    Immature Granulocytes Absolute, Automated 0.07 0.00 - 0.70 x10*3/uL    Lymphocytes Absolute 3.03 1.20 - 4.80 x10*3/uL    Monocytes Absolute 0.87 0.10 - 1.00 x10*3/uL    Eosinophils Absolute 0.19 0.00 - 0.70 x10*3/uL    Basophils Absolute 0.04 0.00 - 0.10 x10*3/uL   Comprehensive Metabolic Panel   Result Value Ref Range    Glucose 102 (H) 74 - 99 mg/dL    Sodium 134 (L) 136 - 145 mmol/L    Potassium 4.5 3.5 - 5.3 mmol/L    Chloride 98 98 - 107 mmol/L    Bicarbonate 24 21 - 32 mmol/L    Anion Gap 17 10 - 20 mmol/L    Urea Nitrogen 21 6 - 23 mg/dL    Creatinine 0.97 0.50 - 1.05 mg/dL    eGFR 67 >60 mL/min/1.73m*2    Calcium 9.6 8.6 - 10.3 mg/dL    Albumin 4.6 3.4 - 5.0 g/dL    Alkaline Phosphatase 109 33 - 136 U/L    Total Protein 7.7 6.4 - 8.2 g/dL    AST 19 9 - 39 U/L    Bilirubin, Total 0.3 0.0 - 1.2 mg/dL    ALT 16 7 - 45 U/L   B-Type Natriuretic Peptide   Result Value Ref Range    BNP 14 0 - 99 pg/mL   Troponin I, High Sensitivity, Initial   Result Value Ref Range    Troponin I, High Sensitivity 3 0 - 13 ng/L   ECG 12 Lead   Result Value Ref Range    Ventricular Rate 84 BPM    Atrial Rate 84 BPM    IL Interval 178 ms    QRS Duration 78 ms    QT Interval 360 ms    QTC Calculation(Bazett) 425 ms    P Axis 73 degrees    R Axis 3 degrees    T Axis 73 degrees    QRS Count 14 beats    Q Onset 226 ms    P Onset 137 ms    P Offset 189 ms    T Offset 406 ms    QTC Fredericia 402 ms    Troponin, High Sensitivity, 1 Hour   Result Value Ref Range    Troponin I, High Sensitivity 3 0 - 13 ng/L        ECG 12 Lead    Result Date: 3/3/2024  Normal sinus rhythm Low voltage QRS Septal infarct (cited on or before 03-MAR-2024) Abnormal ECG When compared with ECG of 11-FEB-2023 11:39, Previous ECG has undetermined rhythm, needs review Questionable change in QRS axis See ED provider note for full interpretation and clinical correlation Confirmed by Lalita Chino ([id number]) on 3/3/2024 1:06:36 PM    XR chest 1 view    Result Date: 3/3/2024  STUDY: Chest Radiograph;  03/03/2024, 10:32AM INDICATION: Shortness of breath. COMPARISON: 02/11/2023, 06/22/2022 XR chest ACCESSION NUMBER(S): [id number] ORDERING CLINICIAN: TERESA LÓPEZ TECHNIQUE:  Frontal chest was obtained at 10:32 hours. FINDINGS: CARDIOMEDIASTINAL SILHOUETTE: Cardiomediastinal silhouette is normal in size and configuration.  LUNGS: Lungs are clear.  ABDOMEN: No remarkable upper abdominal findings.  BONES: No acute osseous changes.    No acute process. Signed by Alan Moreno MD        Assessment and Plan    Principal Problem:    COPD (chronic obstructive pulmonary disease) with acute bronchitis (CMS/HCC)  Dyspnea, chest tightness, reduced Oxygen saturations at home  Symptoms refractory to utilizing rescue COPD lung kit  Acute on chronic hypercapnic respiratory failure  utilizes oxygen at bedtime  Hypotension   ? Volume depletion due to insensible fluid loss with concomitant use of anti-HTNs  CAD  Left heart catheterization 12/2022: Left main less than 10% stenosis, ostial LAD 50% stenosis, proximal ramus circumflex 50% stenosis, RCA 0% stenosis, LVEF 60%.  Follows Dr. Faith  Essential HTN  NIDDM2  Mixed HLD  Obesity  Former tobacco smoker    PLAN  Admit patient  Labs, radiological studies, EKG and prior records reviewed and noted  Monitor on telemetry for arrhythmia  Monitor and replace electrolytes per protocol  Hold antihypertensive  medications for now, patient with low blood pressure trends.  Resume as blood pressure tolerates.  Will utilize hydralazine as needed for blood pressure control  CTA of the chest to rule out pulmonary embolism pending  Start IV Solu-Medrol, IV azithromycin  Start IV fluids for hydration  Nebulizer treatments with RT.  Oxygen per protocol to maintain O2 saturations greater than 92%  Obtain sputum cultures if patient should have productive cough  Antiemetics, antitussives and antipyretics  Resume patient other home medications as appropriate once med rec completed by nursing  COPD navigator  TCC for discharge planning  Anticipated length of stay 48 to 72 hours pending patient's clinical course    A total of 78 minutes was spent on this visit     Plan of care was discussed extensively with patient. Patient verbalized understanding through teach back method. All questions and concerns addressed upon examination.     Of note, this documentation is completed using the Dragon Dictation system (voice recognition software). There may be spelling and/or grammatical errors that were not corrected prior to final submission

## 2024-03-03 NOTE — ED PROVIDER NOTES
HPI   Chief Complaint   Patient presents with    Shortness of Breath     Pt stated she has a history of COPD and stated for the past 5 days she has been having SOB. Pt also has a non productive cough.        58-year-old female presents emergency department, patient states history of COPD, states for the last several days she has been having increased shortness of breath, cough, tightness.  States she is been monitoring her oxygen levels and they seem to be at baseline but she does not feel like her breathing is her baseline.  Patient states about 5 days ago she started rescue pack including antibiotics and prednisone but despite this her symptoms have persisted.  States she did have a fever yesterday, but has been having some bodyaches as well.      History provided by:  Patient   used: No                        Karin Coma Scale Score: 15                     Patient History   Past Medical History:   Diagnosis Date    COPD (chronic obstructive pulmonary disease) (CMS/HCC)     Coronary artery disease     Diabetes mellitus (CMS/[facility])     Hypertension      Past Surgical History:   Procedure Laterality Date    BLADDER SURGERY  09/25/2023    CARDIAC CATHETERIZATION      OTHER SURGICAL HISTORY  07/08/2019    Hysterectomy    OTHER SURGICAL HISTORY  07/08/2019    Cardiac catheterization x 3    OTHER SURGICAL HISTORY  07/08/2019    Appendectomy    OTHER SURGICAL HISTORY  07/08/2019    Tonsillectomy with adenoidectomy    PARTIAL HYSTERECTOMY  09/25/2023     Family History   Problem Relation Name Age of Onset    Other (Cardiac disorder) Mother Justyna Stottlemire     Other (CVA) Mother Justyna Stottlemire     Hypertension Mother Justyna Stottlemire     Other (Diabetes mellitus) Mother Justyna Stottlemire     Heart disease Mother Justyna Stottlemire     Kidney disease Mother Justyna Stottlemire     Stroke Mother Justyna Stottlemire     Cancer Father Oswaldo Guerraiskarina     Heart disease Father Oswaldo Guerrakaiser     Hypertension Father  Oswaldo Adamson     Lung disease Father Oswaldo Adamson     Stroke Father Oswaldo Adamson     Heart disease Maternal Grandmother Sweetie Clay     Kidney disease Maternal Grandmother Sweetie Clay     Cancer Paternal Grandmother Cheyanne Arreaga     Cancer Sister Sunshine Claudia     Depression Sister Sunshine Claudia     Depression Sister Jacquie Carl     Diabetes type II Sister Jacquie Carl     Hypertension Sister Jacquie Carl     Obesity Sister Jacquie Carl     Depression Sister Priya Almonte     Diabetes type I Sister Lisa De Leon     Hypertension Sister Lisa De Leon     Thyroid disease Sister Lisa De Leon     Heart attack Sister Sweetie Saenz     Heart disease Sister Sweetie Saenz     Hypertension Sister Sweetie Saenz     Obesity Sister Sweetie Saenz     Hypertension Sister Miley Power     Stroke Sister Miley Power      Social History     Tobacco Use    Smoking status: Former     Packs/day: 1.00     Years: 30.00     Additional pack years: 0.00     Total pack years: 30.00     Types: Cigarettes     Start date: 1981     Quit date: 2022     Years since quittin.2    Smokeless tobacco: Never   Substance Use Topics    Alcohol use: Not Currently     Comment: Quit drinking 6 months ago    Drug use: Never       Physical Exam   ED Triage Vitals [24 1011]   Temperature Heart Rate Respirations BP   36.3 °C (97.3 °F) 89 (!) 22 129/60      Pulse Ox Temp src Heart Rate Source Patient Position   95 % -- -- --      BP Location FiO2 (%)     -- --       Physical Exam  Physical Exam:  Constitutional: Vitals noted, no distress. Afebrile.   Cardiovascular: Regular, rate, rhythm, no murmur.   Pulmonary: Lungs diminished throughout, although no adventitious breath sounds appreciated.  Mildly increased respiratory effort  Gastrointestinal: Soft, nonsurgical. Nontender. No peritoneal signs. Normoactive bowel sounds.   Musculoskeletal: No peripheral edema. Negative Homans bilaterally, no cords.   Skin: No rash.   Neuro: No focal neurologic deficits,  NIH score of 0.    ED Course & MDM   Diagnoses as of 03/03/24 1413   COPD (chronic obstructive pulmonary disease) with acute bronchitis (CMS/HCC)   Failure of outpatient treatment     Labs Reviewed   COMPREHENSIVE METABOLIC PANEL - Abnormal       Result Value    Glucose 102 (*)     Sodium 134 (*)     Potassium 4.5      Chloride 98      Bicarbonate 24      Anion Gap 17      Urea Nitrogen 21      Creatinine 0.97      eGFR 67      Calcium 9.6      Albumin 4.6      Alkaline Phosphatase 109      Total Protein 7.7      AST 19      Bilirubin, Total 0.3      ALT 16     B-TYPE NATRIURETIC PEPTIDE - Normal    BNP 14      Narrative:        <100 pg/mL - Heart failure unlikely  100-299 pg/mL - Intermediate probability of acute heart                  failure exacerbation. Correlate with clinical                  context and patient history.    >=300 pg/mL - Heart Failure likely. Correlate with clinical                  context and patient history.    BNP testing is performed using different testing methodology at [facility] than at other [facility]. Direct result comparisons should only be made within the same method.      SARS-COV-2 PCR - Normal    Coronavirus 2019, PCR Not Detected      Narrative:     This assay has received FDA Emergency Use Authorization (EUA) and is only authorized for the duration of time that circumstances exist to justify the authorization of the emergency use of in vitro diagnostic tests for the detection of SARS-CoV-2 virus and/or diagnosis of COVID-19 infection under section 564(b)(1) of the Act, 21 U.S.C. 360bbb-3(b)(1). This assay is an in vitro diagnostic nucleic acid amplification test for the qualitative detection of SARS-CoV-2 from nasopharyngeal specimens and has been validated for use at [facility]. Negative results do not preclude COVID-19 infections and should not be used as the sole basis for diagnosis, treatment, or other management decisions.      INFLUENZA A AND B PCR - Normal    Flu A Result Not Detected      Flu B Result Not Detected      Narrative:     This assay is an in vitro diagnostic multiplex nucleic acid amplification test for the detection and discrimination of Influenza A & B from nasopharyngeal specimens, and has been validated for use at [facility]. Negative results do not preclude Influenza A/B infections, and should not be used as the sole basis for diagnosis, treatment, or other management decisions. If Influenza A/B and RSV PCR results are negative, testing for Parainfluenza virus, Adenovirus and Metapneumovirus is routinely performed for [facility]ology and intensive care inpatients, and is available on other patients by placing an add-on request.   SERIAL TROPONIN-INITIAL - Normal    Troponin I, High Sensitivity 3      Narrative:     Less than 99th percentile of normal range cutoff-  Female and children under 18 years old <14 ng/L; Male <21 ng/L: Negative  Repeat testing should be performed if clinically indicated.     Female and children under 18 years old 14-50 ng/L; Male 21-50 ng/L:  Consistent with possible cardiac damage and possible increased clinical   risk. Serial measurements may help to assess extent of myocardial damage.     >50 ng/L: Consistent with cardiac damage, increased clinical risk and  myocardial infarction. Serial measurements may help assess extent of   myocardial damage.      NOTE: Children less than 1 year old may have higher baseline troponin   levels and results should be interpreted in conjunction with the overall   clinical context.     NOTE: Troponin I testing is performed using a different   testing methodology at [facility] than at other   [facility]. Direct result comparisons should only   be made within the same method.   SERIAL TROPONIN, 1 HOUR - Normal    Troponin I, High Sensitivity 3      Narrative:     Less than 99th percentile of normal range  cutoff-  Female and children under 18 years old <14 ng/L; Male <21 ng/L: Negative  Repeat testing should be performed if clinically indicated.     Female and children under 18 years old 14-50 ng/L; Male 21-50 ng/L:  Consistent with possible cardiac damage and possible increased clinical   risk. Serial measurements may help to assess extent of myocardial damage.     >50 ng/L: Consistent with cardiac damage, increased clinical risk and  myocardial infarction. Serial measurements may help assess extent of   myocardial damage.      NOTE: Children less than 1 year old may have higher baseline troponin   levels and results should be interpreted in conjunction with the overall   clinical context.     NOTE: Troponin I testing is performed using a different   testing methodology at [facility] than at other   [facility]. Direct result comparisons should only   be made within the same method.   CBC WITH AUTO DIFFERENTIAL    WBC 9.5      nRBC 0.0      RBC 4.68      Hemoglobin 14.1      Hematocrit 41.9      MCV 90      MCH 30.1      MCHC 33.7      RDW 12.6      Platelets 382      Neutrophils % 56.0      Immature Granulocytes %, Automated 0.7      Lymphocytes % 31.8      Monocytes % 9.1      Eosinophils % 2.0      Basophils % 0.4      Neutrophils Absolute 5.33      Immature Granulocytes Absolute, Automated 0.07      Lymphocytes Absolute 3.03      Monocytes Absolute 0.87      Eosinophils Absolute 0.19      Basophils Absolute 0.04     TROPONIN SERIES- (INITIAL, 1 HR)    Narrative:     The following orders were created for panel order Troponin I Series, High Sensitivity (0, 1 HR).  Procedure                               Abnormality         Status                     ---------                               -----------         ------                     Troponin I, High Sensiti...[[id number]]  Normal              Final result               Troponin, High Sensitivi...[[id number]]  Normal              Final result                  Please view results for these tests on the individual orders.        XR chest 1 view   Final Result   No acute process.   Signed by Alan Moreno MD      CT angio chest for pulmonary embolism    (Results Pending)          Medical Decision Making  Patient presents with shortness of breath for the last week despite prednisone and antibiotics.    Initially given 1 DuoNeb +2 albuterol's, 2 g of IV magnesium and 125 of Solu-Medrol.    EKG at 1023 with ventricular rate of 84, as read by me, shows normal sinus rhythm with normal axis normal interval, unremarkable ST-T wave patterns, no evidence of acute ischemia other acute findings.    Laboratory workup is relatively unremarkable, unremarkable CBC and metabolic panels, negative troponins.  Negative for influenza and COVID-19.  Chest x-ray unremarkable as well.    She continued to have shortness of breath, was given an additional 3 DuoNeb treatments.  Had improved aeration, especially on the left, right remained tight and diminished.    Discussed disposition with the patient, she preferred to be discharged home, although given that the patient's had multiple breathing treatments in the department and is still mildly requiring some oxygen and not feeling back to her baseline I encouraged the patient to be admitted.  Ultimately she agreed to hospitalization.  Excepted to the hospitalist service.    Procedure  Procedures     Lillie Torres, ADRIANA-CNP  03/03/24 1073

## 2024-03-04 ENCOUNTER — HOSPITAL ENCOUNTER (OUTPATIENT)
Dept: RADIOLOGY | Facility: CLINIC | Age: 59
End: 2024-03-04
Payer: COMMERCIAL

## 2024-03-04 VITALS
BODY MASS INDEX: 37.85 KG/M2 | TEMPERATURE: 97.2 F | OXYGEN SATURATION: 95 % | DIASTOLIC BLOOD PRESSURE: 59 MMHG | HEIGHT: 58 IN | HEART RATE: 84 BPM | RESPIRATION RATE: 18 BRPM | SYSTOLIC BLOOD PRESSURE: 121 MMHG | WEIGHT: 180.34 LBS

## 2024-03-04 LAB
ANION GAP SERPL CALC-SCNC: 10 MMOL/L (ref 10–20)
BUN SERPL-MCNC: 20 MG/DL (ref 6–23)
CALCIUM SERPL-MCNC: 8.7 MG/DL (ref 8.6–10.3)
CHLORIDE SERPL-SCNC: 105 MMOL/L (ref 98–107)
CO2 SERPL-SCNC: 25 MMOL/L (ref 21–32)
CREAT SERPL-MCNC: 0.83 MG/DL (ref 0.5–1.05)
EGFRCR SERPLBLD CKD-EPI 2021: 81 ML/MIN/1.73M*2
ERYTHROCYTE [DISTWIDTH] IN BLOOD BY AUTOMATED COUNT: 12.6 % (ref 11.5–14.5)
GLUCOSE BLD MANUAL STRIP-MCNC: 233 MG/DL (ref 74–99)
GLUCOSE BLD MANUAL STRIP-MCNC: 255 MG/DL (ref 74–99)
GLUCOSE SERPL-MCNC: 177 MG/DL (ref 74–99)
HCT VFR BLD AUTO: 34.8 % (ref 36–46)
HGB BLD-MCNC: 11.8 G/DL (ref 12–16)
HOLD SPECIMEN: NORMAL
MCH RBC QN AUTO: 30.3 PG (ref 26–34)
MCHC RBC AUTO-ENTMCNC: 33.9 G/DL (ref 32–36)
MCV RBC AUTO: 90 FL (ref 80–100)
NRBC BLD-RTO: 0 /100 WBCS (ref 0–0)
PLATELET # BLD AUTO: 319 X10*3/UL (ref 150–450)
POTASSIUM SERPL-SCNC: 4 MMOL/L (ref 3.5–5.3)
RBC # BLD AUTO: 3.89 X10*6/UL (ref 4–5.2)
SODIUM SERPL-SCNC: 136 MMOL/L (ref 136–145)
WBC # BLD AUTO: 12.8 X10*3/UL (ref 4.4–11.3)

## 2024-03-04 PROCEDURE — 2500000001 HC RX 250 WO HCPCS SELF ADMINISTERED DRUGS (ALT 637 FOR MEDICARE OP): Performed by: NURSE PRACTITIONER

## 2024-03-04 PROCEDURE — 2500000002 HC RX 250 W HCPCS SELF ADMINISTERED DRUGS (ALT 637 FOR MEDICARE OP, ALT 636 FOR OP/ED): Performed by: NURSE PRACTITIONER

## 2024-03-04 PROCEDURE — 36415 COLL VENOUS BLD VENIPUNCTURE: CPT | Performed by: NURSE PRACTITIONER

## 2024-03-04 PROCEDURE — 82947 ASSAY GLUCOSE BLOOD QUANT: CPT

## 2024-03-04 PROCEDURE — G0378 HOSPITAL OBSERVATION PER HR: HCPCS

## 2024-03-04 PROCEDURE — 96376 TX/PRO/DX INJ SAME DRUG ADON: CPT

## 2024-03-04 PROCEDURE — 96361 HYDRATE IV INFUSION ADD-ON: CPT

## 2024-03-04 PROCEDURE — 85027 COMPLETE CBC AUTOMATED: CPT | Performed by: NURSE PRACTITIONER

## 2024-03-04 PROCEDURE — 80048 BASIC METABOLIC PNL TOTAL CA: CPT | Performed by: NURSE PRACTITIONER

## 2024-03-04 PROCEDURE — 2500000004 HC RX 250 GENERAL PHARMACY W/ HCPCS (ALT 636 FOR OP/ED): Performed by: NURSE PRACTITIONER

## 2024-03-04 PROCEDURE — 94640 AIRWAY INHALATION TREATMENT: CPT

## 2024-03-04 PROCEDURE — 2500000005 HC RX 250 GENERAL PHARMACY W/O HCPCS: Performed by: NURSE PRACTITIONER

## 2024-03-04 PROCEDURE — 99238 HOSP IP/OBS DSCHRG MGMT 30/<: CPT | Performed by: NURSE PRACTITIONER

## 2024-03-04 RX ORDER — L. ACIDOPHILUS/L.BULGARICUS 1MM CELL
1 TABLET ORAL DAILY
Qty: 10 TABLET | Refills: 0 | Status: SHIPPED | OUTPATIENT
Start: 2024-03-05 | End: 2024-03-15 | Stop reason: WASHOUT

## 2024-03-04 RX ORDER — L. ACIDOPHILUS/L.BULGARICUS 1MM CELL
1 TABLET ORAL DAILY
Status: DISCONTINUED | OUTPATIENT
Start: 2024-03-04 | End: 2024-03-04 | Stop reason: HOSPADM

## 2024-03-04 RX ORDER — AZITHROMYCIN 250 MG/1
TABLET, FILM COATED ORAL
Qty: 6 TABLET | Refills: 0 | Status: SHIPPED | OUTPATIENT
Start: 2024-03-04 | End: 2024-03-08 | Stop reason: ALTCHOICE

## 2024-03-04 RX ORDER — IPRATROPIUM BROMIDE AND ALBUTEROL SULFATE 2.5; .5 MG/3ML; MG/3ML
3 SOLUTION RESPIRATORY (INHALATION)
Qty: 60 ML | Refills: 0 | Status: SHIPPED | OUTPATIENT
Start: 2024-03-04 | End: 2024-06-10 | Stop reason: SDUPTHER

## 2024-03-04 RX ADMIN — ACETAMINOPHEN 650 MG: 325 TABLET ORAL at 09:34

## 2024-03-04 RX ADMIN — SERTRALINE HYDROCHLORIDE 100 MG: 50 TABLET, FILM COATED ORAL at 08:19

## 2024-03-04 RX ADMIN — Medication 3 L/MIN: at 02:08

## 2024-03-04 RX ADMIN — ASPIRIN 81 MG: 81 TABLET, COATED ORAL at 08:18

## 2024-03-04 RX ADMIN — ALBUTEROL SULFATE 2.5 MG: 2.5 SOLUTION RESPIRATORY (INHALATION) at 12:56

## 2024-03-04 RX ADMIN — METOPROLOL SUCCINATE 25 MG: 25 TABLET, EXTENDED RELEASE ORAL at 08:18

## 2024-03-04 RX ADMIN — Medication 1 TABLET: at 09:31

## 2024-03-04 RX ADMIN — ANTACID TABLETS 500 MG: 500 TABLET, CHEWABLE ORAL at 08:31

## 2024-03-04 RX ADMIN — LISINOPRIL: 20 TABLET ORAL at 08:18

## 2024-03-04 RX ADMIN — MAGNESIUM OXIDE 400 MG (241.3 MG MAGNESIUM) TABLET 400 MG: TABLET at 08:19

## 2024-03-04 RX ADMIN — INSULIN LISPRO 2 UNITS: 100 INJECTION, SOLUTION INTRAVENOUS; SUBCUTANEOUS at 08:23

## 2024-03-04 RX ADMIN — METHYLPREDNISOLONE SODIUM SUCCINATE 40 MG: 40 INJECTION, POWDER, LYOPHILIZED, FOR SOLUTION INTRAMUSCULAR; INTRAVENOUS at 06:09

## 2024-03-04 RX ADMIN — ALBUTEROL SULFATE 2.5 MG: 2.5 SOLUTION RESPIRATORY (INHALATION) at 02:07

## 2024-03-04 RX ADMIN — SODIUM CHLORIDE 75 ML/HR: 9 INJECTION, SOLUTION INTRAVENOUS at 02:30

## 2024-03-04 RX ADMIN — RANOLAZINE 500 MG: 500 TABLET, FILM COATED, EXTENDED RELEASE ORAL at 08:18

## 2024-03-04 RX ADMIN — INSULIN LISPRO 3 UNITS: 100 INJECTION, SOLUTION INTRAVENOUS; SUBCUTANEOUS at 11:39

## 2024-03-04 RX ADMIN — ALBUTEROL SULFATE 2.5 MG: 2.5 SOLUTION RESPIRATORY (INHALATION) at 06:58

## 2024-03-04 RX ADMIN — PANTOPRAZOLE SODIUM 40 MG: 40 TABLET, DELAYED RELEASE ORAL at 06:09

## 2024-03-04 RX ADMIN — PSYLLIUM HUSK 1 PACKET: 3.4 POWDER ORAL at 08:19

## 2024-03-04 RX ADMIN — LEVOTHYROXINE SODIUM 88 MCG: 0.09 TABLET ORAL at 06:09

## 2024-03-04 ASSESSMENT — COGNITIVE AND FUNCTIONAL STATUS - GENERAL
DAILY ACTIVITIY SCORE: 24
MOBILITY SCORE: 24

## 2024-03-04 ASSESSMENT — PAIN - FUNCTIONAL ASSESSMENT
PAIN_FUNCTIONAL_ASSESSMENT: 0-10
PAIN_FUNCTIONAL_ASSESSMENT: 0-10

## 2024-03-04 ASSESSMENT — PAIN SCALES - GENERAL
PAINLEVEL_OUTOF10: 6
PAINLEVEL_OUTOF10: 8

## 2024-03-04 NOTE — CARE PLAN
The patient's goals for the shift include      The clinical goals for the shift include safety      Problem: Safety  Goal: Patient will be injury free during hospitalization  Outcome: Progressing     Problem: Respiratory  Goal: Minimal/no exertional discomfort or dyspnea this shift  Outcome: Progressing  Flowsheets (Taken 3/3/2024 2237)  Minimal/no exertional discomfort or dyspnea this shift: Positioning to promote ventilation/comfort

## 2024-03-04 NOTE — DISCHARGE SUMMARY
Discharge Diagnosis  COPD (chronic obstructive pulmonary disease) with acute bronchitis (CMS/[facility])    Issues Requiring Follow-Up  COPD    Discharge Meds     Your medication list        START taking these medications        Instructions Last Dose Given Next Dose Due   azithromycin 250 mg tablet  Commonly known as: Zithromax      Take 2 tabs (500 mg) by mouth today, than 1 tab (250 mg) daily for 4 days.       lactobacillus acidophilus tablet tablet  Start taking on: March 5, 2024      Take 1 tablet by mouth once daily for 10 days. Do not start before March 5, 2024.              CHANGE how you take these medications        Instructions Last Dose Given Next Dose Due   ipratropium-albuteroL 0.5-2.5 mg/3 mL nebulizer solution  Commonly known as: Duo-Neb  What changed:   how to take this  when to take this  reasons to take this      Take 3 mL by nebulization 4 times a day for 5 days.              CONTINUE taking these medications        Instructions Last Dose Given Next Dose Due   aspirin 81 mg EC tablet           atorvastatin 80 mg tablet  Commonly known as: Lipitor      Take 1 tablet (80 mg) by mouth once daily.       codeine-guaifenesin  mg/5 mL syrup  Commonly known as: Robitussin-AC           levothyroxine 88 mcg tablet  Commonly known as: Synthroid, Levoxyl      TAKE 1 TABLET EVERY MORNING BEFORE MEAL       lisinopriL-hydrochlorothiazide 20-25 mg tablet      Take 1 tablet by mouth once daily.       magnesium oxide 400 mg (241.3 mg magnesium) tablet  Commonly known as: Mag-Ox      Take 1 tablet (400 mg) by mouth once daily.       metFORMIN  mg 24 hr tablet  Commonly known as: Glucophage-XR      Take 1 tablet (500 mg) by mouth once daily with a meal. Do not crush, chew, or split.       metoprolol succinate XL 25 mg 24 hr tablet  Commonly known as: Toprol-XL      Take 1 tablet (25 mg) by mouth once daily.       Mounjaro 2.5 mg/0.5 mL pen injector  Generic drug: tirzepatide      Inject 2.5 mg under the skin 1  (one) time per week.       Mucus Relief  mg 12 hr tablet  Generic drug: guaiFENesin      TAKE 1 OR 2 TABLETS BY MOUTH TWICE DAILY AS NEEDED FOR CONGESTION.       oxygen gas therapy  Commonly known as: O2           ranolazine 500 mg 12 hr tablet  Commonly known as: Ranexa           semaglutide 0.25 mg or 0.5 mg (2 mg/3 mL) pen injector      Inject 0.25 mg under the skin 1 (one) time per week.       sertraline 100 mg tablet  Commonly known as: Zoloft      TAKE 1 TABLET BY MOUTH EVERY DAY       Trelegy Ellipta 100-62.5-25 mcg blister with device  Generic drug: fluticasone-umeclidin-vilanter           Trulicity 0.75 mg/0.5 mL pen injector  Generic drug: dulaglutide      Inject 0.75 mg under the skin 1 (one) time per week.       Ventolin HFA 90 mcg/actuation inhaler  Generic drug: albuterol      INHALE 2 PUFFS BY MOUTH EVERY 4 HOURS              STOP taking these medications      cefuroxime 500 mg tablet  Commonly known as: Ceftin        predniSONE 10 mg tablet  Commonly known as: Deltasone                  Where to Get Your Medications        These medications were sent to [facility]/pharmacy #[id number] [address] AT [place] [address]      Phone: [phone]   azithromycin 250 mg tablet  ipratropium-albuteroL 0.5-2.5 mg/3 mL nebulizer solution  lactobacillus acidophilus tablet tablet         Test Results Pending At Discharge  Pending Labs       No current pending labs.            Hospital Course   This is a 58-year-old female presented to [facility] with symptoms of COPD exacerbation.  She tried to manage her exacerbation with her lung kit over the last 5 days but was unable to get symptoms under control.  Dyspnea progressed and she also had some hypoxia.  Patient was admitted, given supplemental O2, nebulizer treatments, IV steroids and IV antibiotics.  Patient responded well overnight.  Now medically stabilized for discharge.    On the day of  discharge patient hemodynamically stable    A total of 30 minutes was spent on this discharge    Plan of care was discussed extensively with patient. Patient verbalized understanding through teach back method. All questions and concerns addressed upon examination.     Of note, this documentation is completed using the Dragon Dictation system (voice recognition software). There may be spelling and/or grammatical errors that were not corrected prior to final submission.    Thank you for allow us to care for you at [facility].  If you have any questions or concerns while reading your discharge instructions, please feel free to contact your provider at [phone].  We wish you a speedy recovery!     Pertinent Physical Exam At Time of Discharge  Physical Exam  Vitals reviewed.   Constitutional:       Appearance: Normal appearance. She is normal weight.   HENT:      Head: Normocephalic and atraumatic.      Mouth/Throat:      Mouth: Mucous membranes are moist.   Eyes:      Extraocular Movements: Extraocular movements intact.      Pupils: Pupils are equal, round, and reactive to light.   Cardiovascular:      Rate and Rhythm: Normal rate and regular rhythm.      Pulses: Normal pulses.      Heart sounds: Normal heart sounds. No murmur heard.     No gallop.   Pulmonary:      Effort: Pulmonary effort is normal. No respiratory distress.      Breath sounds: No wheezing, rhonchi or rales.      Comments: Breath sounds diminished but greatly improved as compared to yesterday.  No wheezes or rhonchi  Abdominal:      General: Abdomen is flat. Bowel sounds are normal. There is no distension.      Palpations: Abdomen is soft. There is no mass.      Tenderness: There is no abdominal tenderness. There is no rebound.      Hernia: No hernia is present.   Musculoskeletal:         General: No swelling, tenderness or signs of injury. Normal range of motion.      Cervical back: Normal range of motion and neck supple.      Right lower leg: No  edema.      Left lower leg: No edema.   Skin:     General: Skin is warm and dry.      Capillary Refill: Capillary refill takes less than 2 seconds.      Findings: No bruising, erythema or rash.   Neurological:      General: No focal deficit present.      Mental Status: She is alert and oriented to person, place, and time.      Cranial Nerves: No cranial nerve deficit.      Sensory: No sensory deficit.      Motor: No weakness.   Psychiatric:         Mood and Affect: Mood normal.         Behavior: Behavior normal.         Outpatient Follow-Up  Future Appointments   Date Time Provider Department Center   3/11/2024  9:30 AM ELY AMHERST CT 1 ELYAmhCT ELCHRISTIANNE [facility] Rad   3/27/2024  8:00 AM Raghav Garnett DO [id number] [place]   4/16/2024 10:30 AM Y ULTRASOUND 3 ELYUS RACHELL Mishraia M   4/25/2024  2:00 PM Lester Lizarraga MD [id number] [place]   2/5/2025  7:30 AM Jean Faith MD [id number] [place]         Lianet Morris, APRN-CNP

## 2024-03-05 ENCOUNTER — PATIENT OUTREACH (OUTPATIENT)
Dept: CARE COORDINATION | Facility: CLINIC | Age: 59
End: 2024-03-05
Payer: COMMERCIAL

## 2024-03-05 ENCOUNTER — PATIENT OUTREACH (OUTPATIENT)
Dept: HOME HEALTH SERVICES | Age: 59
End: 2024-03-05
Payer: COMMERCIAL

## 2024-03-05 SDOH — ECONOMIC STABILITY: TRANSPORTATION INSECURITY
IN THE PAST 12 MONTHS, HAS THE LACK OF TRANSPORTATION KEPT YOU FROM MEDICAL APPOINTMENTS OR FROM GETTING MEDICATIONS?: NO

## 2024-03-05 SDOH — SOCIAL STABILITY: SOCIAL INSECURITY
WITHIN THE LAST YEAR, HAVE YOU BEEN KICKED, HIT, SLAPPED, OR OTHERWISE PHYSICALLY HURT BY YOUR PARTNER OR EX-PARTNER?: NO

## 2024-03-05 SDOH — SOCIAL STABILITY: SOCIAL NETWORK: ARE YOU MARRIED, WIDOWED, DIVORCED, SEPARATED, NEVER MARRIED, OR LIVING WITH A PARTNER?: SEPARATED

## 2024-03-05 SDOH — ECONOMIC STABILITY: FOOD INSECURITY: WITHIN THE PAST 12 MONTHS, YOU WORRIED THAT YOUR FOOD WOULD RUN OUT BEFORE YOU GOT MONEY TO BUY MORE.: NEVER TRUE

## 2024-03-05 SDOH — SOCIAL STABILITY: SOCIAL INSECURITY: WITHIN THE LAST YEAR, HAVE YOU BEEN AFRAID OF YOUR PARTNER OR EX-PARTNER?: NO

## 2024-03-05 SDOH — ECONOMIC STABILITY: INCOME INSECURITY: HOW HARD IS IT FOR YOU TO PAY FOR THE VERY BASICS LIKE FOOD, HOUSING, MEDICAL CARE, AND HEATING?: NOT HARD AT ALL

## 2024-03-05 SDOH — SOCIAL STABILITY: SOCIAL INSECURITY: WITHIN THE LAST YEAR, HAVE YOU BEEN HUMILIATED OR EMOTIONALLY ABUSED IN OTHER WAYS BY YOUR PARTNER OR EX-PARTNER?: NO

## 2024-03-05 SDOH — SOCIAL STABILITY: SOCIAL NETWORK
DO YOU BELONG TO ANY CLUBS OR ORGANIZATIONS SUCH AS CHURCH GROUPS UNIONS, FRATERNAL OR ATHLETIC GROUPS, OR SCHOOL GROUPS?: NO

## 2024-03-05 SDOH — SOCIAL STABILITY: SOCIAL NETWORK: HOW OFTEN DO YOU GET TOGETHER WITH FRIENDS OR RELATIVES?: ONCE A WEEK

## 2024-03-05 SDOH — ECONOMIC STABILITY: INCOME INSECURITY: IN THE PAST 12 MONTHS, HAS THE ELECTRIC, GAS, OIL, OR WATER COMPANY THREATENED TO SHUT OFF SERVICE IN YOUR HOME?: NO

## 2024-03-05 SDOH — ECONOMIC STABILITY: FOOD INSECURITY: WITHIN THE PAST 12 MONTHS, THE FOOD YOU BOUGHT JUST DIDN'T LAST AND YOU DIDN'T HAVE MONEY TO GET MORE.: NEVER TRUE

## 2024-03-05 SDOH — HEALTH STABILITY: MENTAL HEALTH
STRESS IS WHEN SOMEONE FEELS TENSE, NERVOUS, ANXIOUS, OR CAN'T SLEEP AT NIGHT BECAUSE THEIR MIND IS TROUBLED. HOW STRESSED ARE YOU?: NOT AT ALL

## 2024-03-05 SDOH — HEALTH STABILITY: PHYSICAL HEALTH: ON AVERAGE, HOW MANY DAYS PER WEEK DO YOU ENGAGE IN MODERATE TO STRENUOUS EXERCISE (LIKE A BRISK WALK)?: 2 DAYS

## 2024-03-05 SDOH — SOCIAL STABILITY: SOCIAL NETWORK: HOW OFTEN DO YOU ATTENT MEETINGS OF THE CLUB OR ORGANIZATION YOU BELONG TO?: NEVER

## 2024-03-05 SDOH — ECONOMIC STABILITY: INCOME INSECURITY: IN THE LAST 12 MONTHS, WAS THERE A TIME WHEN YOU WERE NOT ABLE TO PAY THE MORTGAGE OR RENT ON TIME?: NO

## 2024-03-05 SDOH — HEALTH STABILITY: PHYSICAL HEALTH: ON AVERAGE, HOW MANY MINUTES DO YOU ENGAGE IN EXERCISE AT THIS LEVEL?: 10 MIN

## 2024-03-05 SDOH — HEALTH STABILITY: MENTAL HEALTH: HOW OFTEN DO YOU HAVE A DRINK CONTAINING ALCOHOL?: NEVER

## 2024-03-05 SDOH — SOCIAL STABILITY: SOCIAL NETWORK: IN A TYPICAL WEEK, HOW MANY TIMES DO YOU TALK ON THE PHONE WITH FAMILY, FRIENDS, OR NEIGHBORS?: THREE TIMES A WEEK

## 2024-03-05 SDOH — HEALTH STABILITY: MENTAL HEALTH: HOW OFTEN DO YOU HAVE 6 OR MORE DRINKS ON ONE OCCASION?: NEVER

## 2024-03-05 SDOH — ECONOMIC STABILITY: HOUSING INSECURITY: IN THE LAST 12 MONTHS, HOW MANY PLACES HAVE YOU LIVED?: 1

## 2024-03-05 SDOH — HEALTH STABILITY: MENTAL HEALTH: HOW MANY STANDARD DRINKS CONTAINING ALCOHOL DO YOU HAVE ON A TYPICAL DAY?: PATIENT DOES NOT DRINK

## 2024-03-05 SDOH — SOCIAL STABILITY: SOCIAL INSECURITY
WITHIN THE LAST YEAR, HAVE TO BEEN RAPED OR FORCED TO HAVE ANY KIND OF SEXUAL ACTIVITY BY YOUR PARTNER OR EX-PARTNER?: NO

## 2024-03-05 SDOH — SOCIAL STABILITY: SOCIAL NETWORK: HOW OFTEN DO YOU ATTEND CHURCH OR RELIGIOUS SERVICES?: MORE THAN 4 TIMES PER YEAR

## 2024-03-05 ASSESSMENT — LIFESTYLE VARIABLES
AUDIT-C TOTAL SCORE: 0
SKIP TO QUESTIONS 9-10: 1

## 2024-03-05 NOTE — PROGRESS NOTES
Pt with hx COPD,  DM2 dx 5 months ago, was never given equipment to check bgt. Will need to be addressed during initial HHVC. Evaluate for "[employer]- [employer] [facility] [place].  Pt with pulse ox and bp cuff at home, monitors vitals daily currently. Initial hhvc set for 3/8 0930.    Patient's Address:   [address] [facility] [address]  **  If this is not the address patient will receive services - alert team and address in EMR**       Patient Contacts:  Extended Emergency Contact Information  Primary Emergency Contact: Shayan Thompson  Home Phone: [phone]  Work Phone: [phone]  Relation: Spouse                                Patient's Preferred Phone: [phone]  Patient's E-mail: [email or web address]

## 2024-03-05 NOTE — PROGRESS NOTES
Discharge Facility: [facility]  Discharge Diagnosis: COPD with acute bronchitis  Admission Date: 3/3/2024  Discharge Date: 3/4/2024    PCP Appointment Date:  -3/11/2024 1140    Specialist Appointment Date:   -3/11/2024 0930 LDCT Lung Screening  -4/16/2024 1030 CV Ultrasound Vascular  -4/25/2024 1400 Vascular Dr. Lizarraga    Hospital Encounter and Summary: Linked     See discharge assessment below for further details    Engagement  Call Start Time: 0949 (3/5/2024  9:51 AM)    Medications  Medications reviewed with patient/caregiver?: Yes (new prescriptions reviewed; azithromycin, ipratropium-albuterol, and lactobacillus acidophilus) (3/5/2024  9:51 AM)  Is the patient having any side effects they believe may be caused by any medication additions or changes?: No (3/5/2024  9:51 AM)  Does the patient have all medications ordered at discharge?: Yes (3/5/2024  9:51 AM)  Care Management Interventions: No intervention needed (3/5/2024  9:51 AM)  Is the patient taking all medications as directed (includes completed medication regime)?: Yes (3/5/2024  9:51 AM)    Appointments  Does the patient have a primary care provider?: Yes (3/5/2024  9:51 AM)  Care Management Interventions: Verified appointment date/time/provider (3/5/2024  9:51 AM)  Has the patient kept scheduled appointments due by today?: Yes (3/5/2024  9:51 AM)    Self Management  Has home health visited the patient within 72 hours of discharge?: Not applicable (3/5/2024  9:51 AM)    Patient Teaching  Does the patient have access to their discharge instructions?: Yes (3/5/2024  9:51 AM)  Care Management Interventions: Reviewed instructions with patient (3/5/2024  9:51 AM)  What is the patient's perception of their health status since discharge?: Improving (3/5/2024  9:51 AM)  Is the patient/caregiver able to teach back the hierarchy of who to call/visit for symptoms/problems? PCP, Specialist, Home Health nurse, Urgent Care, ED, 911: Yes (3/5/2024  9:51  AM)    Wrap Up  Wrap Up Additional Comments: Pt was admitted to [facility] 3/3-3/4/2024 for COPD with acute bronchitis. Pt reports feeling better since discharge. Pt discharged with new prescriptions; azithromycin, lactobacillus acidophilus, and duo-nebs; pt denies issues or questions regarding medication. Pt reports understanding of discharge instructions. Verified pt PCP appt 3/11/2024 1140. Pt denies any questions, needs, or concerns at this time. She is encouraged to call if questions or needs arise. (3/5/2024  9:51 AM)  Call End Time: 0951 (3/5/2024  9:51 AM)

## 2024-03-06 ENCOUNTER — PATIENT OUTREACH (OUTPATIENT)
Dept: HOME HEALTH SERVICES | Age: 59
End: 2024-03-06
Payer: COMMERCIAL

## 2024-03-06 NOTE — PROGRESS NOTES
Daily Call Note:     Daily call completed with the patient. She hasn't taken her BP yet.  States she is doing good.  She had no issues or concerns. Reminded her of upcoming appts.  Will address getting a BS machine for patient to be able to check her glucose on her initial [facility] call.     Pt Education:   Barriers:   Topics for Daily Review:   Pt demonstrates clear understanding:     Daily Weight:  There were no vitals filed for this visit.   Last 3 Weights:  Wt Readings from Last 7 Encounters:   03/03/24 81.8 kg (180 lb 5.4 oz)   02/02/24 83.9 kg (184 lb 14.4 oz)   01/03/24 84.8 kg (187 lb)   10/11/23 81.6 kg (180 lb)   07/13/23 80.7 kg (178 lb)   02/01/23 77.3 kg (170 lb 5 oz)   01/12/23 76.7 kg (169 lb)       Masimo Device:    Masimo Clinical Impression:     Virtual Visits--Scheduled (Most Recent Date at Top)  Follow up Appointments  Recent Visits  No visits were found meeting these conditions.  Showing recent visits within past 30 days and meeting all other requirements  Future Appointments  Date Type Provider Dept   03/11/24 Appointment Raghav Garnett, DO Do [id number] [facility]   03/27/24 Appointment Raghav Garnett, DO Do [id number] [facility]   Showing future appointments within next 90 days and meeting all other requirements       Frequency of RN Calls & Virtual Visits per Team Agreement:     Medication issues Addressed (what was done):     Follow up appointments scheduled by [facility] Staff:   Referrals made by [facility] staff:       [facility] Care Transitions Assessment    Patient's Address:   [address] [address]  **  If this is not the address patient will receive services - alert team and address in EMR**       Patient Contacts:  Extended Emergency Contact Information  Primary Emergency Contact: Shayan Thompson  Home Phone: [phone]  Work Phone: [phone]  Relation: Spouse                                Patient's Preferred Phone: [phone]  Patient's E-mail:  [email or web address]

## 2024-03-07 ENCOUNTER — PATIENT OUTREACH (OUTPATIENT)
Dept: HOME HEALTH SERVICES | Age: 59
End: 2024-03-07
Payer: COMMERCIAL

## 2024-03-07 VITALS — DIASTOLIC BLOOD PRESSURE: 74 MMHG | OXYGEN SATURATION: 94 % | HEART RATE: 78 BPM | SYSTOLIC BLOOD PRESSURE: 131 MMHG

## 2024-03-07 NOTE — PROGRESS NOTES
Daily Call Note:     Ms. Thompson states she is doing fine today.   Blood pressure 131/74  Hr 78,  Oxygen 94 Room, Oxygen Prn throughout he day and 2 liters @ night.    Weight 176    Patient does not have a Glucometer.    Patient declined Masimo.         Initial [facility] 03/08/24 @ 09:30.  Patient aware and is requested telephone call.    /74   Pulse 78   SpO2 94% Comment: Room air      Pt Education:   Barriers:   Topics for Daily Review: Pt demonstrates clear understanding:     Daily Weight:  There were no vitals filed for this visit.   Last 3 Weights:  Wt Readings from Last 7 Encounters:   03/03/24 81.8 kg (180 lb 5.4 oz)   02/02/24 83.9 kg (184 lb 14.4 oz)   01/03/24 84.8 kg (187 lb)   10/11/23 81.6 kg (180 lb)   07/13/23 80.7 kg (178 lb)   02/01/23 77.3 kg (170 lb 5 oz)   01/12/23 76.7 kg (169 lb)       Masimo Device:    Masimo Clinical Impression:     Virtual Visits--Scheduled (Most Recent Date at Top)  Follow up Appointments  Recent Visits  No visits were found meeting these conditions.  Showing recent visits within past 30 days and meeting all other requirements  Future Appointments  Date Type Provider Dept   03/11/24 Appointment Raghav Garnett, DO Do [id number] [facility]   03/27/24 Appointment Raghav Garnett, DO Do [id number] [facility]   Showing future appointments within next 90 days and meeting all other requirements       Frequency of RN Calls & Virtual Visits per Team Agreement:     Medication issues Addressed (what was done):     Follow up appointments scheduled by [facility] Staff:   Referrals made by [facility] staff:       [facility] Care Transitions Assessment    Patient's Address:   [address] [facility] [address]  **  If this is not the address patient will receive services - alert team and address in EMR**       Patient Contacts:  Extended Emergency Contact Information  Primary Emergency Contact: Shayan Thompson  Home Phone: [phone]  Work Phone: [phone]  Relation: Spouse                                 Patient's Preferred Phone: [phone]  Patient's E-mail: [email or web address]

## 2024-03-08 ENCOUNTER — PATIENT OUTREACH (OUTPATIENT)
Dept: HOME HEALTH SERVICES | Age: 59
End: 2024-03-08

## 2024-03-08 VITALS — OXYGEN SATURATION: 94 % | SYSTOLIC BLOOD PRESSURE: 105 MMHG | DIASTOLIC BLOOD PRESSURE: 64 MMHG | HEART RATE: 86 BPM

## 2024-03-08 NOTE — PROGRESS NOTES
Daily Call Note:   Initial [facility] call, pt states she is doing better, still tired and cough is improving. O2 sat 94% on room air, she wears O2 2L NC HS. VSS, denies any fever, chills. States she has occ lightheadedness in AM when she wakes up. Advised moving slow to awake, monitor BP, pox, and once gets glucometer, monitoring AM fasting BG.   RX for glucometer sent to [facility] with supplies. Pt has no transportation issues or cost issues with meds. Med list reviewed with pharmacy.   Eating, drinking good, advised eating if any low BG and calling in.  She has Pulm apt Dr. Zamudio 3/15, all other follow up reviewed and next [facility] scheduled.   Pt Education: POC  Barriers: na  Topics for Daily Review: BG, breathing  Pt demonstrates clear understanding: Yes    Daily Weight:  There were no vitals filed for this visit.   Last 3 Weights:  Wt Readings from Last 7 Encounters:   03/03/24 81.8 kg (180 lb 5.4 oz)   02/02/24 83.9 kg (184 lb 14.4 oz)   01/03/24 84.8 kg (187 lb)   10/11/23 81.6 kg (180 lb)   07/13/23 80.7 kg (178 lb)   02/01/23 77.3 kg (170 lb 5 oz)   01/12/23 76.7 kg (169 lb)       Masimo Device: No   Masimo Clinical Impression: na    Virtual Visits--Scheduled (Most Recent Date at Top)  Follow up Appointments  Recent Visits  No visits were found meeting these conditions.  Showing recent visits within past 30 days and meeting all other requirements  Future Appointments  Date Type Provider Dept   03/11/24 Appointment Raghav Garnett, DO Do [id number] [facility]   03/27/24 Appointment Raghav Garnett, DO Do [id number] [facility]   Showing future appointments within next 90 days and meeting all other requirements       Frequency of RN Calls & Virtual Visits per Team Agreement: Healthy at Home Frequency: Daily    Medication issues Addressed (what was done): meds reviewed    Follow up appointments scheduled by [facility] Staff: na  Referrals made by [facility] staff: na      [facility] Care Transitions Assessment    Patient's  Address:   [address] [address]  **  If this is not the address patient will receive services - alert team and address in EMR**       Patient Contacts:  Extended Emergency Contact Information  Primary Emergency Contact: Shayan Thompson  Home Phone: [phone]  Work Phone: [phone]  Relation: Spouse                                Patient's Preferred Phone: [phone]  Patient's E-mail: [email or web address]

## 2024-03-09 ENCOUNTER — PATIENT OUTREACH (OUTPATIENT)
Dept: HOME HEALTH SERVICES | Age: 59
End: 2024-03-09
Payer: COMMERCIAL

## 2024-03-09 VITALS — SYSTOLIC BLOOD PRESSURE: 105 MMHG | HEART RATE: 86 BPM | DIASTOLIC BLOOD PRESSURE: 62 MMHG | OXYGEN SATURATION: 93 %

## 2024-03-09 NOTE — PROGRESS NOTES
Daily Call Note: Daily call completed with patient , she states she is doing well , morning glucose was 217. Patient states she was slightly nauseated this mornig, states she took a mucinex last evening explained , that could be the cause of upset stomach, confirms understanding, slight cough, mostly dry, has appt for lung scan next week, offered no other complaints    Pt Education: self care  Barriers:   Topics for Daily Review: self care  Pt demonstrates clear understanding: Yes    Daily Weight:  There were no vitals filed for this visit.   Last 3 Weights:  Wt Readings from Last 7 Encounters:   03/03/24 81.8 kg (180 lb 5.4 oz)   02/02/24 83.9 kg (184 lb 14.4 oz)   01/03/24 84.8 kg (187 lb)   10/11/23 81.6 kg (180 lb)   07/13/23 80.7 kg (178 lb)   02/01/23 77.3 kg (170 lb 5 oz)   01/12/23 76.7 kg (169 lb)       Masimo Device: No   Masimo Clinical Impression:     Virtual Visits--Scheduled (Most Recent Date at Top)  Follow up Appointments  Recent Visits  No visits were found meeting these conditions.  Showing recent visits within past 30 days and meeting all other requirements  Future Appointments  Date Type Provider Dept   03/11/24 Appointment Raghav Garnett, DO Do [id number] [facility]   03/27/24 Appointment Raghav Garnett, DO Do [id number] [facility]   Showing future appointments within next 90 days and meeting all other requirements       Frequency of RN Calls & Virtual Visits per Team Agreement: Healthy at Home Frequency: Daily    Medication issues Addressed (what was done): mucinex possible cause of am nausea    Follow up appointments scheduled by [facility] Staff:   Referrals made by [facility] staff:       [facility] Care Transitions Assessment    Patient's Address:   [address] [facility] [address]  **  If this is not the address patient will receive services - alert team and address in EMR**       Patient Contacts:  Extended Emergency Contact Information  Primary Emergency Contact: Shayan Thompson  Phone: [phone]  Work Phone: [phone]  Relation: Spouse                                Patient's Preferred Phone: [phone]  Patient's E-mail: [email or web address]

## 2024-03-11 ENCOUNTER — PATIENT OUTREACH (OUTPATIENT)
Dept: HOME HEALTH SERVICES | Age: 59
End: 2024-03-11

## 2024-03-11 ENCOUNTER — HOSPITAL ENCOUNTER (OUTPATIENT)
Dept: RADIOLOGY | Facility: CLINIC | Age: 59
Discharge: HOME | End: 2024-03-11
Payer: COMMERCIAL

## 2024-03-11 ENCOUNTER — OFFICE VISIT (OUTPATIENT)
Dept: PRIMARY CARE | Facility: CLINIC | Age: 59
End: 2024-03-11
Payer: COMMERCIAL

## 2024-03-11 VITALS
SYSTOLIC BLOOD PRESSURE: 104 MMHG | RESPIRATION RATE: 20 BRPM | BODY MASS INDEX: 37.21 KG/M2 | WEIGHT: 178 LBS | TEMPERATURE: 97 F | OXYGEN SATURATION: 96 % | HEART RATE: 76 BPM | DIASTOLIC BLOOD PRESSURE: 60 MMHG

## 2024-03-11 DIAGNOSIS — F17.210 NICOTINE DEPENDENCE, CIGARETTES, UNCOMPLICATED: ICD-10-CM

## 2024-03-11 DIAGNOSIS — J20.9 COPD (CHRONIC OBSTRUCTIVE PULMONARY DISEASE) WITH ACUTE BRONCHITIS (MULTI): Primary | ICD-10-CM

## 2024-03-11 DIAGNOSIS — E74.89 DISORDER OF GLUCOSE METABOLISM (MULTI): ICD-10-CM

## 2024-03-11 DIAGNOSIS — I73.00 RAYNAUD'S PHENOMENON WITHOUT GANGRENE: ICD-10-CM

## 2024-03-11 DIAGNOSIS — J44.0 COPD (CHRONIC OBSTRUCTIVE PULMONARY DISEASE) WITH ACUTE BRONCHITIS (MULTI): Primary | ICD-10-CM

## 2024-03-11 DIAGNOSIS — E11.9 TYPE 2 DIABETES MELLITUS WITHOUT COMPLICATION, WITHOUT LONG-TERM CURRENT USE OF INSULIN (MULTI): ICD-10-CM

## 2024-03-11 PROCEDURE — 1036F TOBACCO NON-USER: CPT | Performed by: FAMILY MEDICINE

## 2024-03-11 PROCEDURE — 99213 OFFICE O/P EST LOW 20 MIN: CPT | Performed by: FAMILY MEDICINE

## 2024-03-11 PROCEDURE — 71271 CT THORAX LUNG CANCER SCR C-: CPT

## 2024-03-11 PROCEDURE — 3078F DIAST BP <80 MM HG: CPT | Performed by: FAMILY MEDICINE

## 2024-03-11 PROCEDURE — 3074F SYST BP LT 130 MM HG: CPT | Performed by: FAMILY MEDICINE

## 2024-03-11 RX ORDER — DULAGLUTIDE 1.5 MG/.5ML
1.5 INJECTION, SOLUTION SUBCUTANEOUS
Qty: 2 ML | Refills: 11 | Status: SHIPPED | OUTPATIENT
Start: 2024-03-11 | End: 2024-06-10 | Stop reason: SDUPTHER

## 2024-03-11 ASSESSMENT — ENCOUNTER SYMPTOMS
PALPITATIONS: 0
HEADACHES: 0
NERVOUS/ANXIOUS: 1
HEMATOLOGIC/LYMPHATIC NEGATIVE: 1
MUSCULOSKELETAL NEGATIVE: 1
CARDIOVASCULAR NEGATIVE: 1
FATIGUE: 0
ENDOCRINE NEGATIVE: 1
DIZZINESS: 0
SHORTNESS OF BREATH: 1
WHEEZING: 1
CHEST TIGHTNESS: 1

## 2024-03-11 NOTE — ADDENDUM NOTE
Addended by: ROBBIE MIXON on: 3/11/2024 01:23 PM     Modules accepted: Level of Service     Influenza vaccination (VIS Pub Date: August 19, 2014)/MI

## 2024-03-11 NOTE — PROGRESS NOTES
Daily Call Note: Daily [facility] call complete.  Pt reports she is feeling ok.  She is on the way to Md appt so call was brief.  .  B/P 95/62.  No concerns voiced.  Verified upcoming [facility] appt.      Pt Education:  POC   Barriers:   Topics for Daily Review: weight. B/P, BGM   Pt demonstrates clear understanding: Yes    Daily Weight:  174 lbs   There were no vitals filed for this visit.   Last 3 Weights:  Wt Readings from Last 7 Encounters:   03/11/24 78.9 kg (174 lb)   03/03/24 81.8 kg (180 lb 5.4 oz)   02/02/24 83.9 kg (184 lb 14.4 oz)   01/03/24 84.8 kg (187 lb)   10/11/23 81.6 kg (180 lb)   07/13/23 80.7 kg (178 lb)   02/01/23 77.3 kg (170 lb 5 oz)       Masimo Device: No   Masimo Clinical Impression:     Virtual Visits--Scheduled (Most Recent Date at Top)  Follow up Appointments  Recent Visits  No visits were found meeting these conditions.  Showing recent visits within past 30 days and meeting all other requirements  Today's Visits  Date Type Provider Dept   03/11/24 Appointment Raghav Garnett, DO Do [id number] [facility]   Showing today's visits and meeting all other requirements  Future Appointments  Date Type Provider Dept   03/27/24 Appointment Raghav Garnett, DO Do [id number] [facility]   Showing future appointments within next 90 days and meeting all other requirements       Frequency of RN Calls & Virtual Visits per Team Agreement: Healthy at Home Frequency: Daily    Medication issues Addressed (what was done):     Follow up appointments scheduled by [facility] Staff:   Referrals made by [facility] staff:       [facility] Care Transitions Assessment    Patient's Address:   [address] [address]  **  If this is not the address patient will receive services - alert team and address in EMR**       Patient Contacts:  Extended Emergency Contact Information  Primary Emergency Contact: Shayan Thompson  Home Phone: [phone]  Work Phone: [phone]  Relation: Spouse                                 Patient's Preferred Phone: [phone]  Patient's E-mail: [email or web address]

## 2024-03-11 NOTE — PROGRESS NOTES
Subjective   Patient ID: Lauren Thompson is a 58 y.o. female who presents for Follow-up (1 week follow up from hospital stay for SOB and low BP. Pt states she has been feeling fatigue and nauseous.  She has also noticed her fingers and toes will turn a white with purple and are tingling and very cold. This will last aprox 15 min and is daily.  ).    HPI     Review of Systems   Constitutional:  Negative for fatigue.   HENT: Negative.     Respiratory:  Positive for chest tightness, shortness of breath and wheezing.    Cardiovascular: Negative.  Negative for chest pain and palpitations.   Endocrine: Negative.    Musculoskeletal: Negative.    Skin: Negative.    Neurological:  Negative for dizziness and headaches.   Hematological: Negative.    Psychiatric/Behavioral:  The patient is nervous/anxious.        Objective   /60   Pulse 76   Temp 36.1 °C (97 °F)   Resp 20   Wt 80.7 kg (178 lb)   SpO2 96%   BMI 37.21 kg/m²     Physical Exam  Constitutional:       Appearance: Normal appearance.   HENT:      Head: Normocephalic.      Right Ear: Tympanic membrane, ear canal and external ear normal.      Left Ear: Tympanic membrane, ear canal and external ear normal.      Nose: Nose normal.      Mouth/Throat:      Mouth: Mucous membranes are moist.      Pharynx: Oropharynx is clear.   Eyes:      Conjunctiva/sclera: Conjunctivae normal.   Cardiovascular:      Rate and Rhythm: Normal rate and regular rhythm.   Pulmonary:      Effort: Pulmonary effort is normal.      Breath sounds: Normal breath sounds.   Musculoskeletal:         General: Normal range of motion.      Cervical back: Neck supple.   Skin:     General: Skin is warm and dry.      Comments: Pt had a picture of Raynauds symptoms   Neurological:      General: No focal deficit present.      Mental Status: She is alert and oriented to person, place, and time.   Psychiatric:         Mood and Affect: Mood normal.         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

## 2024-03-12 ENCOUNTER — PATIENT OUTREACH (OUTPATIENT)
Dept: HOME HEALTH SERVICES | Age: 59
End: 2024-03-12
Payer: COMMERCIAL

## 2024-03-12 DIAGNOSIS — E11.9 TYPE 2 DIABETES MELLITUS WITHOUT COMPLICATION, WITHOUT LONG-TERM CURRENT USE OF INSULIN (MULTI): ICD-10-CM

## 2024-03-12 DIAGNOSIS — J44.9 COPD, SEVERE (MULTI): Primary | ICD-10-CM

## 2024-03-12 NOTE — PROGRESS NOTES
Daily Call Note: [facility] daily call complete.  Pt reports she is feeling well.  Continues  w home oxygen, POX 92 %.  Does report chronic cough, denies fevers.  B?p 120/71, HR 79, .   Verified upcoming [facility] appt.        Pt Education:  POC   Barriers:   Topics for Daily Review:  VS/ BGM   Pt demonstrates clear understanding: Yes    Daily Weight:  There were no vitals filed for this visit.   Last 3 Weights:  Wt Readings from Last 7 Encounters:   03/11/24 80.7 kg (178 lb)   03/11/24 78.9 kg (174 lb)   03/03/24 81.8 kg (180 lb 5.4 oz)   02/02/24 83.9 kg (184 lb 14.4 oz)   01/03/24 84.8 kg (187 lb)   10/11/23 81.6 kg (180 lb)   07/13/23 80.7 kg (178 lb)       Masimo Device: No   Masimo Clinical Impression:     Virtual Visits--Scheduled (Most Recent Date at Top)  Follow up Appointments  Recent Visits  Date Type Provider Dept   03/11/24 Office Visit Raghav Garnett, DO Do [id number] [facility]   Showing recent visits within past 30 days and meeting all other requirements  Future Appointments  Date Type Provider Dept   03/27/24 Appointment Raghav Garnett, DO Do [id number] [facility]   06/10/24 Appointment Raghav Garnett, DO Do [id number] [facility]   Showing future appointments within next 90 days and meeting all other requirements       Frequency of RN Calls & Virtual Visits per Team Agreement: Healthy at Home Frequency: Daily    Medication issues Addressed (what was done):     Follow up appointments scheduled by [facility] Staff:   Referrals made by [facility] staff:       [facility] Care Transitions Assessment    Patient's Address:   [address] [address]  **  If this is not the address patient will receive services - alert team and address in EMR**       Patient Contacts:  Extended Emergency Contact Information  Primary Emergency Contact: Mj Thompsonneth  Home Phone: [phone]  Work Phone: [phone]  Relation: Spouse                                Patient's Preferred Phone: [phone]  Patient's  E-mail: [email or web address]

## 2024-03-13 ENCOUNTER — PATIENT OUTREACH (OUTPATIENT)
Dept: HOME HEALTH SERVICES | Age: 59
End: 2024-03-13
Payer: COMMERCIAL

## 2024-03-13 VITALS — DIASTOLIC BLOOD PRESSURE: 64 MMHG | OXYGEN SATURATION: 91 % | HEART RATE: 82 BPM | SYSTOLIC BLOOD PRESSURE: 125 MMHG

## 2024-03-13 DIAGNOSIS — E11.9 TYPE 2 DIABETES MELLITUS WITHOUT COMPLICATION, WITHOUT LONG-TERM CURRENT USE OF INSULIN (MULTI): Primary | ICD-10-CM

## 2024-03-13 NOTE — PROGRESS NOTES
Daily Call Note:   Daily call, patient states she is feeling good. Reports VSS, room air pulse ox 91%, she wears o2 2l NC HS only. No c/o sob, pain or swelling. AM BG today 115. Denies questions about meds, taking as ordered.  Diet discussed and she requests info on DM, nutrition consult obtained and info given to patient to schedule.  Pt has Pulm apt Friday and [facility], to review ct chest results with pulm.     Pt Education: POC  Barriers: na   Topics for Daily Review: bg, vs, breathing  Pt demonstrates clear understanding: Yes    Daily Weight:  There were no vitals filed for this visit.   Last 3 Weights:  Wt Readings from Last 7 Encounters:   03/11/24 80.7 kg (178 lb)   03/11/24 78.9 kg (174 lb)   03/03/24 81.8 kg (180 lb 5.4 oz)   02/02/24 83.9 kg (184 lb 14.4 oz)   01/03/24 84.8 kg (187 lb)   10/11/23 81.6 kg (180 lb)   07/13/23 80.7 kg (178 lb)       Masimo Device: No   Masimo Clinical Impression: na    Virtual Visits--Scheduled (Most Recent Date at Top)  Follow up Appointments  Recent Visits  Date Type Provider Dept   03/11/24 Office Visit Raghav Garnett, DO Do [id number] [facility]   Showing recent visits within past 30 days and meeting all other requirements  Future Appointments  Date Type Provider Dept   03/27/24 Appointment Raghav Garnett, DO Do [id number] [facility]   06/10/24 Appointment Raghav Garnett, DO Do [id number] [facility]   Showing future appointments within next 90 days and meeting all other requirements       Frequency of RN Calls & Virtual Visits per Team Agreement: Healthy at Home Frequency: Daily    Medication issues Addressed (what was done): na    Follow up appointments scheduled by [facility] Staff: na  Referrals made by [facility] staff: yes nutrition      [facility] Care Transitions Assessment    Patient's Address:   [address] [facility] [address]  **  If this is not the address patient will receive services - alert team and address in EMR**       Patient Contacts:  Extended Emergency  Contact Information  Primary Emergency Contact: Shayan Thompson  Home Phone: [phone]  Work Phone: [phone]  Relation: Spouse                                Patient's Preferred Phone: [phone]  Patient's E-mail: [email or web address]

## 2024-03-15 ENCOUNTER — TELEMEDICINE (OUTPATIENT)
Dept: PHARMACY | Facility: HOSPITAL | Age: 59
End: 2024-03-15
Payer: COMMERCIAL

## 2024-03-15 ENCOUNTER — PATIENT OUTREACH (OUTPATIENT)
Dept: HOME HEALTH SERVICES | Age: 59
End: 2024-03-15

## 2024-03-15 VITALS — OXYGEN SATURATION: 92 % | BODY MASS INDEX: 36.79 KG/M2 | WEIGHT: 176 LBS

## 2024-03-15 DIAGNOSIS — J44.9 COPD, SEVERE (MULTI): ICD-10-CM

## 2024-03-15 DIAGNOSIS — E11.9 TYPE 2 DIABETES MELLITUS WITHOUT COMPLICATION, WITHOUT LONG-TERM CURRENT USE OF INSULIN (MULTI): ICD-10-CM

## 2024-03-15 DIAGNOSIS — I10 HTN (HYPERTENSION), BENIGN: ICD-10-CM

## 2024-03-15 ASSESSMENT — ENCOUNTER SYMPTOMS
DIABETIC ASSOCIATED SYMPTOMS: 0
HYPERTENSION: 1

## 2024-03-15 NOTE — PROGRESS NOTES
"Daily Call Note:  [facility] completed with Dr. Arechiga and Seng.   Ms. Thompson stated she is feeling fine, a little congested today.  Patient has a moist cough.   Blood sugar today 99 mg/dl this morning.      Patient see Pulmonary this morning at 09:15.    Dr. Arechiga briefly reviewed lung scan with Ms. Jay Thompson uses Trilegy every morning and albuterol as needed.  Lately she has been using it frequents, \"a couple times a day\".  Ms. Thompson sates she can't remember her blood pressure but it was in the normal range.   Oxygen 92 room air, Wt 176 lb  Patient lives with her daughter   Ms. Thompson increased her Trulicity this past Wednesday    [facility] 3/18/24 @ 08:00  Patient has no questions or concerns.      Pt Education:   Barriers:   Topics for Daily Review:   Pt demonstrates clear understanding:     Daily Weight:  There were no vitals filed for this visit.   Last 3 Weights:  Wt Readings from Last 7 Encounters:   03/11/24 80.7 kg (178 lb)   03/11/24 78.9 kg (174 lb)   03/03/24 81.8 kg (180 lb 5.4 oz)   02/02/24 83.9 kg (184 lb 14.4 oz)   01/03/24 84.8 kg (187 lb)   10/11/23 81.6 kg (180 lb)   07/13/23 80.7 kg (178 lb)       Masimo Device:    Masimo Clinical Impression:     Virtual Visits--Scheduled (Most Recent Date at Top)  Follow up Appointments  Recent Visits  Date Type Provider Dept   03/11/24 Office Visit Raghav Garnett, DO Do [id number] [facility]   Showing recent visits within past 30 days and meeting all other requirements  Future Appointments  Date Type Provider Dept   03/27/24 Appointment Raghav Garnett, DO Do [id number] [facility]   06/10/24 Appointment Raghav Garnett, DO Do [id number] [facility]   Showing future appointments within next 90 days and meeting all other requirements       Frequency of RN Calls & Virtual Visits per Team Agreement:     Medication issues Addressed (what was done):     Follow up appointments scheduled by [facility] Staff:   Referrals made by [facility] staff:       [facility] Care Transitions " Assessment    Patient's Address:   [address] [facility] [address]  **  If this is not the address patient will receive services - alert team and address in EMR**       Patient Contacts:  Extended Emergency Contact Information  Primary Emergency Contact: Shayan Thompson  Home Phone: [phone]  Work Phone: [phone]  Relation: Spouse                                Patient's Preferred Phone: [phone]  Patient's E-mail: [email or web address]

## 2024-03-15 NOTE — PROGRESS NOTES
Pharmacy Post-Discharge Visit  Lauren Thompson is a 58 y.o. female was referred to Clinical Pharmacy Team to complete a post-discharge medication optimization and monitoring visit.  The patient was referred for their blood pressure, COPD and diabetes management. She also is currently enrolled in our [facility].     Admission Date: 3/3/24  Discharge Date: 3/4/24    Referring Provider: Tana Pugh, ADRIANA-DOMINIQUE, CALVIN  PCP: Raghav Garnett, DO    Subjective   No Known Allergies    [facility]/pharmacy #[id number] - [place], [place] - [address] [facility] AT CORNER OF [facility]  [address] [place] [address]  Phone: [phone] Fax: [phone]    [facility] - CARA Rodrigues - [facility] AT Portal to [facility] Sites  [facility]  [place] [address]  Phone: [phone] Fax: [phone]      Social History     Social History Narrative    Not on file        Notable Medication changes following discharge:  Start: z-anton  Stop: none  Change: duo neb frequency     Hypertension  This is a chronic problem. The problem has been resolved since onset. The problem is controlled. Risk factors for coronary artery disease include diabetes mellitus and smoking/tobacco exposure. Past treatments include ACE inhibitors, diuretics, beta blockers and direct vasodilators. The current treatment provides significant improvement. There are no compliance problems.    Diabetes  She presents for her follow-up diabetic visit. She has type 2 diabetes mellitus. Her disease course has been improving. There are no hypoglycemic associated symptoms. There are no diabetic associated symptoms. There are no hypoglycemic complications. There are no diabetic complications. Risk factors for coronary artery disease include diabetes mellitus and tobacco exposure. Current diabetic treatment includes insulin injections and oral agent (monotherapy). She is compliant with treatment all of the time. Her  home blood glucose trend is decreasing steadily. Her overall blood glucose range is  mg/dl. An ACE inhibitor/angiotensin II receptor blocker is being taken.     COPD ASSESSMENT    Rescue Inhaler Use:  -How many times per week do you use your rescue inhale? Using about 2-3x daily still     Inhaler Technique:  -How do you use your rescue inhaler?  --as needed     -How do you use your maintenance inhaler?  --every morning     Secondary Prevention (vaccines):  -Influenza: Date [10/11/2023]  -PCV13: Date [n/a]  -PPSV23: Date [03/03/2016]    Sx Management:  -Increased cough? no  -Increased sputum production? no  -Increased SOB? no    Exacerbation Hx:  -When was your last hospitalization for an exacerbation? This past admission  -When was the last time you were treated with antibiotics and/or steroids? This past admission - completed antibiotic course      Review of Systems    Medication System Management:  Affordability/Accessibility: $0 co-pays for prescriptions  Adherence/Organization: no concerns   Adverse Effects: none    Objective     There were no vitals taken for this visit.     LAB  Lab Results   Component Value Date    BILITOT 0.3 03/03/2024    CALCIUM 8.7 03/04/2024    CO2 25 03/04/2024     03/04/2024    CREATININE 0.83 03/04/2024    GLUCOSE 177 (H) 03/04/2024    ALKPHOS 109 03/03/2024    K 4.0 03/04/2024    PROT 7.7 03/03/2024     03/04/2024    AST 19 03/03/2024    ALT 16 03/03/2024    BUN 20 03/04/2024    ANIONGAP 10 03/04/2024    MG 2.00 06/22/2022    PHOS 2.4 (L) 05/05/2021     02/14/2020    ALBUMIN 4.6 03/03/2024    AMYLASE 48 06/22/2022    LIPASE 41 06/22/2022    GFRF >90 07/11/2023     Lab Results   Component Value Date    TRIG 334 (H) 07/11/2023    CHOL 217 (H) 07/11/2023    HDL 39.7 (A) 07/11/2023     Lab Results   Component Value Date    HGBA1C 7.2 (H) 10/11/2023         Current Outpatient Medications on File Prior to Visit   Medication Sig Dispense Refill    albuterol  (Ventolin HFA) 90 mcg/actuation inhaler INHALE 2 PUFFS BY MOUTH EVERY 4 HOURS 18 g 3    aspirin 81 mg EC tablet Take 1 tablet (81 mg) by mouth once daily.      atorvastatin (Lipitor) 80 mg tablet Take 1 tablet (80 mg) by mouth once daily. 90 tablet 3    blood sugar diagnostic (Blood Glucose Test) strip 1 each once daily. Start checking sugars at least first thing in the morning before eating 100 each 2    blood-glucose meter misc Use daily or as directed for monitoring of diabetes. 1 each 0    dulaglutide (Trulicity) 0.75 mg/0.5 mL pen injector Inject 0.75 mg under the skin 1 (one) time per week. 2 mL 11    dulaglutide (Trulicity) 1.5 mg/0.5 mL pen injector injection Inject 1.5 mg under the skin 1 (one) time per week. 2 mL 11    ipratropium-albuteroL (Duo-Neb) 0.5-2.5 mg/3 mL nebulizer solution Take 3 mL by nebulization 4 times a day for 5 days. 60 mL 0    lactobacillus acidophilus tablet tablet Take 1 tablet by mouth once daily for 10 days. Do not start before March 5, 2024. (Patient not taking: Reported on 3/11/2024) 10 tablet 0    lancets misc 1 each once daily. 100 each 2    levothyroxine (Synthroid, Levoxyl) 88 mcg tablet TAKE 1 TABLET EVERY MORNING BEFORE MEAL 90 tablet 3    lisinopriL-hydrochlorothiazide 20-25 mg tablet Take 1 tablet by mouth once daily. 90 tablet 3    magnesium oxide (Mag-Ox) 400 mg (241.3 mg magnesium) tablet Take 1 tablet (400 mg) by mouth once daily. 90 tablet 3    metFORMIN XR (Glucophage-XR) 500 mg 24 hr tablet Take 1 tablet (500 mg) by mouth once daily with a meal. Do not crush, chew, or split. 90 tablet 3    metoprolol succinate XL (Toprol-XL) 25 mg 24 hr tablet Take 1 tablet (25 mg) by mouth once daily. 90 tablet 3    Mucus Relief  mg 12 hr tablet TAKE 1 OR 2 TABLETS BY MOUTH TWICE DAILY AS NEEDED FOR CONGESTION. 120 tablet 3    oxygen (O2) gas therapy Inhale 2 each. As needed      ranolazine (Ranexa) 500 mg 12 hr tablet Take 1 tablet (500 mg) by mouth twice a day.       sertraline (Zoloft) 100 mg tablet TAKE 1 TABLET BY MOUTH EVERY DAY 90 tablet 3    Trelegy Ellipta 100-62.5-25 mcg blister with device INHALE 1 PUFF BY MOUTH DAILY. RINSE MOUTH AND SPIT AFTER USE.       No current facility-administered medications on file prior to visit.        HISTORICAL PHARMACOTHERAPY  -was recently diagnosed as being diabetic with A1c of 7.2% and was started on metformin and GLP-1 but was never given any supplies to check sugars   -was already on triple therapy inhaler before this admission     DRUG INTERACTIONS  - none    Assessment/Plan   Problem List Items Addressed This Visit       COPD, severe (CMS/[facility])    HTN (hypertension), benign    Type 2 diabetes mellitus without complication, without long-term current use of insulin (CMS/[facility])   COPD  Completed z-anton from discharge   Continue trelegy daily   Albuterol inhaler and duo nebs as needed   Was 92% room air this morning   Wears 2L during the night   HTN  BP's have been stable   Continue lisinopril-hydrochlorothiazide, metoprolol and ranolazine   DM  Was able to  new glucometer and testing supplies from her pharmacy and has been at least checking her fasting sugar every morning - this morning was 99 and she felt fine   Continue metformin 500mg daily with breakfast   Trulicity increased to 1.5mg once weekly --> started this past Wednesday, 3/13   No complaints of any nausea or abdominal pain     Will follow up next week     Continue all meds under the continuation of care with the referring provider and clinical pharmacy team.    Seng Cedeño, Alissa     Verbal consent to manage patient's drug therapy was obtained from [the patient and/or an individual authorized to act on behalf of a patient]. They were informed they may decline to participate or withdraw from participation in pharmacy services at any time.

## 2024-03-16 ENCOUNTER — PATIENT OUTREACH (OUTPATIENT)
Dept: HOME HEALTH SERVICES | Age: 59
End: 2024-03-16
Payer: COMMERCIAL

## 2024-03-16 NOTE — PROGRESS NOTES
Daily Call Note:   3/16/24 @ 1142 BP Follow-up. Current /64. Dr. Tineo updated.    3/16/24 @ 0913 Daily call completed. Patient reported that she woke up to her heart rate bounding. She stated that it usually result from not taking her Lisinopril medication. Patient educated on how Lisinopril works. I asked patient what was her blood pressure before she took medication. Patient stated 98/58. Educated patient to call SOC before taking medication when BP is not in normal limits. Reviewing patient's chart. It appears as if patient's diastolic trends between 56-70. Dr. Tineo updated. Dr. Tineo wanted me to reeducate patient on how and when to take medication. Patient informed me that her heart rate is no longer bounding since taking med. Will call patient back to reassess. Patient aware of next [facility] 3/18/@ 0800  BP 98/58  HR 97    Pt Education: How to take Lisinopril and Metoprolol  Barriers: No  Topics for Daily Review: BP, Medication Administration  Pt demonstrates clear understanding: Yes    Daily Weight:  There were no vitals filed for this visit.   Last 3 Weights:  Wt Readings from Last 7 Encounters:   03/15/24 79.8 kg (176 lb)   03/11/24 80.7 kg (178 lb)   03/11/24 78.9 kg (174 lb)   03/03/24 81.8 kg (180 lb 5.4 oz)   02/02/24 83.9 kg (184 lb 14.4 oz)   01/03/24 84.8 kg (187 lb)   10/11/23 81.6 kg (180 lb)       Masimo Device: No   Masimo Clinical Impression: N/A    Virtual Visits--Scheduled (Most Recent Date at Top)  Follow up Appointments  Recent Visits  Date Type Provider Dept   03/11/24 Office Visit Raghav Garnett, DO Do [id number] [facility]   Showing recent visits within past 30 days and meeting all other requirements  Future Appointments  Date Type Provider Dept   03/27/24 Appointment Raghav Garnett, DO Do [id number] [facility]   06/10/24 Appointment Raghav aGrnett, DO Do [id number] [facility]   Showing future appointments within next 90 days and meeting all other requirements       Frequency of RN Calls  & Virtual Visits per Team Agreement: [facility] Home Frequency: Daily    Medication issues Addressed (what was done): Yes, educated patient, informed provider (Dr. Tineo)    Follow up appointments scheduled by [facility] Staff: None required  Referrals made by [facility] staff: None required      [facility] Care Transitions Assessment    Patient's Address:   [facility] [address] [facility] [address]  **  If this is not the address patient will receive services - alert team and address in EMR**       Patient Contacts:  Extended Emergency Contact Information  Primary Emergency Contact: Shayan Thompson  Home Phone: [phone]  Work Phone: [phone]  Relation: Spouse                                Patient's Preferred Phone: [phone]  Patient's E-mail: [email or web address]

## 2024-03-17 RX ORDER — CIPROFLOXACIN 500 MG/1
500 TABLET ORAL 2 TIMES DAILY
COMMUNITY
Start: 2024-03-15 | End: 2024-03-26 | Stop reason: ALTCHOICE

## 2024-03-18 ENCOUNTER — APPOINTMENT (OUTPATIENT)
Dept: PHARMACY | Facility: HOSPITAL | Age: 59
End: 2024-03-18
Payer: COMMERCIAL

## 2024-03-19 ENCOUNTER — TELEMEDICINE (OUTPATIENT)
Dept: PHARMACY | Facility: HOSPITAL | Age: 59
End: 2024-03-19
Payer: COMMERCIAL

## 2024-03-19 ENCOUNTER — PATIENT OUTREACH (OUTPATIENT)
Dept: HOME HEALTH SERVICES | Age: 59
End: 2024-03-19

## 2024-03-19 VITALS — SYSTOLIC BLOOD PRESSURE: 112 MMHG | HEART RATE: 80 BPM | OXYGEN SATURATION: 92 % | DIASTOLIC BLOOD PRESSURE: 64 MMHG

## 2024-03-19 DIAGNOSIS — J44.9 COPD, SEVERE (MULTI): Primary | ICD-10-CM

## 2024-03-19 DIAGNOSIS — E11.9 TYPE 2 DIABETES MELLITUS WITHOUT COMPLICATION, WITHOUT LONG-TERM CURRENT USE OF INSULIN (MULTI): ICD-10-CM

## 2024-03-19 ASSESSMENT — ENCOUNTER SYMPTOMS: DIABETIC ASSOCIATED SYMPTOMS: 0

## 2024-03-19 NOTE — PROGRESS NOTES
Weekly [facility] call completed with Dr CARMENZA howard and Seng from Pharmacy patient has been doing ok feels a little raspy this am when feels a little worse when it cold out her glucose last night 100 this am 134 patient saw her pulmonologist a couple days ago she put her on an antibiotic Cipro and changed what she is using in her nebulizer to Upelri patient states that seems to be working better for her she has been able to decrease use to 3 times per day BP today 112/64 HR 80 92 % on RA sometimes her diastolic BP is lower but she is asymptomatic patient states she only uses her O2 during the day if she is less than 88% patient was advised not to take her cholesterol medication clarified that she should take it just needs to space them out she is gong to call today to set up her nutrition consult states se has the number to call frequency changed to tue/thur/sat next [facility] appointment 3/26 @ 0800    Patient's Address:   [address] [place] [address]  **  If this is not the address patient will receive services - alert team and address in EMR**       Patient Contacts:  Extended Emergency Contact Information  Primary Emergency Contact: Shayan Thompson  Home Phone: [phone]  Work Phone: [phone]  Relation: Spouse                                Patient's Preferred Phone: [phone]  Patient's E-mail: [email or web address]

## 2024-03-19 NOTE — PROGRESS NOTES
Pharmacy Post-Discharge Visit - Follow Up  Lauren Thompson is a 58 y.o. female was referred to Clinical Pharmacy Team to complete a post-discharge medication optimization and monitoring visit.  The patient was referred for their COPD and diabetes management while also being enrolled in [facility]. Since our last visit, she did see her pulmonologist and she was started on an antibiotic for 7 days along with getting a couple samples for Yupelri for her nebulizer.       Referring Provider: Robby Arechiga MD  PCP: Raghav Garnett, DO    Subjective   No Known Allergies    [facility]/pharmacy #[id number] - [place], [place] - [address] [address] AT [facility] [address] [facility]  [address] [address] [address] [facility] [address]  Phone: [phone] Fax: [phone]    [facility] - [facility] AT Portal to [facility]  [place] [address]  Phone: [phone] Fax: [phone]      Social History     Social History Narrative    Not on file          Diabetes  She presents for her follow-up diabetic visit. She has type 2 diabetes mellitus. There are no hypoglycemic associated symptoms. There are no diabetic associated symptoms. There are no hypoglycemic complications. Symptoms are stable. Risk factors for coronary artery disease include diabetes mellitus, dyslipidemia and hypertension. Current diabetic treatment includes insulin injections and oral agent (monotherapy). She is compliant with treatment all of the time. There is no change in her home blood glucose trend. Her overall blood glucose range is 110-130 mg/dl. An ACE inhibitor/angiotensin II receptor blocker is being taken.     COPD ASSESSMENT     Rescue Inhaler Use:  -How many times per week do you use your rescue inhale? Only used about 2-3x this past week because she received samples for Yupelri so has been using that 3 times daily with her nebulizer machine     Inhaler Technique:  -How do you use your rescue inhaler?  --as  needed      -How do you use your maintenance inhaler?  --every morning   --has been using yupelri tid      Secondary Prevention (vaccines):  -Influenza: Date [10/11/2023]  -PCV13: Date [n/a]  -PPSV23: Date [03/03/2016]     Sx Management:  -Increased cough? no  -Increased sputum production? no  -Increased SOB? no     Exacerbation Hx:  -When was your last hospitalization for an exacerbation? This past admission  -When was the last time you were treated with antibiotics and/or steroids? This past admission - re-started on new antibiotic for 7 days from pulmonologist on 3/15    Review of Systems    Medication System Management:  Affordability/Accessibility: no concerns   Adherence/Organization: no issues   Adverse Effects: none    Objective     There were no vitals taken for this visit.     LAB  Lab Results   Component Value Date    BILITOT 0.3 03/03/2024    CALCIUM 8.7 03/04/2024    CO2 25 03/04/2024     03/04/2024    CREATININE 0.83 03/04/2024    GLUCOSE 177 (H) 03/04/2024    ALKPHOS 109 03/03/2024    K 4.0 03/04/2024    PROT 7.7 03/03/2024     03/04/2024    AST 19 03/03/2024    ALT 16 03/03/2024    BUN 20 03/04/2024    ANIONGAP 10 03/04/2024    MG 2.00 06/22/2022    PHOS 2.4 (L) 05/05/2021     02/14/2020    ALBUMIN 4.6 03/03/2024    AMYLASE 48 06/22/2022    LIPASE 41 06/22/2022    GFRF >90 07/11/2023     Lab Results   Component Value Date    TRIG 334 (H) 07/11/2023    CHOL 217 (H) 07/11/2023    HDL 39.7 (A) 07/11/2023     Lab Results   Component Value Date    HGBA1C 7.2 (H) 10/11/2023         Current Outpatient Medications on File Prior to Visit   Medication Sig Dispense Refill    albuterol (Ventolin HFA) 90 mcg/actuation inhaler INHALE 2 PUFFS BY MOUTH EVERY 4 HOURS 18 g 3    aspirin 81 mg EC tablet Take 1 tablet (81 mg) by mouth once daily.      atorvastatin (Lipitor) 80 mg tablet Take 1 tablet (80 mg) by mouth once daily. 90 tablet 3    blood sugar diagnostic (Blood Glucose Test) strip 1 each  once daily. Start checking sugars at least first thing in the morning before eating 100 each 2    blood-glucose meter misc Use daily or as directed for monitoring of diabetes. 1 each 0    ciprofloxacin (Cipro) 500 mg tablet Take 1 tablet (500 mg) by mouth 2 times a day.      dulaglutide (Trulicity) 1.5 mg/0.5 mL pen injector injection Inject 1.5 mg under the skin 1 (one) time per week. 2 mL 11    ipratropium-albuteroL (Duo-Neb) 0.5-2.5 mg/3 mL nebulizer solution Take 3 mL by nebulization 4 times a day for 5 days. 60 mL 0    lancets misc 1 each once daily. 100 each 2    levothyroxine (Synthroid, Levoxyl) 88 mcg tablet TAKE 1 TABLET EVERY MORNING BEFORE MEAL 90 tablet 3    lisinopriL-hydrochlorothiazide 20-25 mg tablet Take 1 tablet by mouth once daily. 90 tablet 3    magnesium oxide (Mag-Ox) 400 mg (241.3 mg magnesium) tablet Take 1 tablet (400 mg) by mouth once daily. 90 tablet 3    metFORMIN XR (Glucophage-XR) 500 mg 24 hr tablet Take 1 tablet (500 mg) by mouth once daily with a meal. Do not crush, chew, or split. 90 tablet 3    metoprolol succinate XL (Toprol-XL) 25 mg 24 hr tablet Take 1 tablet (25 mg) by mouth once daily. 90 tablet 3    Mucus Relief  mg 12 hr tablet TAKE 1 OR 2 TABLETS BY MOUTH TWICE DAILY AS NEEDED FOR CONGESTION. 120 tablet 3    oxygen (O2) gas therapy Inhale 2 each. As needed      ranolazine (Ranexa) 500 mg 12 hr tablet Take 1 tablet (500 mg) by mouth twice a day.      sertraline (Zoloft) 100 mg tablet TAKE 1 TABLET BY MOUTH EVERY DAY 90 tablet 3    Trelegy Ellipta 100-62.5-25 mcg blister with device INHALE 1 PUFF BY MOUTH DAILY. RINSE MOUTH AND SPIT AFTER USE.      [DISCONTINUED] dulaglutide (Trulicity) 0.75 mg/0.5 mL pen injector Inject 0.75 mg under the skin 1 (one) time per week. 2 mL 11    [DISCONTINUED] lactobacillus acidophilus tablet tablet Take 1 tablet by mouth once daily for 10 days. Do not start before March 5, 2024. (Patient not taking: Reported on 3/11/2024) 10 tablet 0      No current facility-administered medications on file prior to visit.          Assessment/Plan   Problem List Items Addressed This Visit    None  COPD  Started cipro 500mg bid x 7 days on 3/15  Continue trelegy daily   Albuterol inhaler and duo nebs as needed   Was 92% room air this morning   Wears 2L during the night   Has been using yupelri solution three times daily (received as samples from pulmonologist) - not able to run test claim to see if it would be covered with insurance   HTN  BP's have been stable   BP today 112/64  Continue lisinopril-hydrochlorothiazide, metoprolol and ranolazine   DM  Was able to  new glucometer and testing supplies from her pharmacy and has been at least checking her fasting sugar every morning and sometimes in the evening   All sugars have been between 100-150   Continue metformin 500mg daily with breakfast   Continue trulicity 1.5mg once weekly   No complaints of any nausea or abdominal pain      Will follow up next week     Continue all meds under the continuation of care with the referring provider and clinical pharmacy team.    Seng Cedeño, Alissa     Verbal consent to manage patient's drug therapy was obtained from [the patient and/or an individual authorized to act on behalf of a patient]. They were informed they may decline to participate or withdraw from participation in pharmacy services at any time.

## 2024-03-21 ENCOUNTER — PATIENT OUTREACH (OUTPATIENT)
Dept: HOME HEALTH SERVICES | Age: 59
End: 2024-03-21
Payer: COMMERCIAL

## 2024-03-21 NOTE — PROGRESS NOTES
Daily call completed patient states she is doing well FBG  this am 130 yesterday was 100 HR 83 POX 94 BP 97/58 no medication needs questions and concerns addressed upcoming appointments reviewed she did get her Nutrition consult scheduled next call is Saturday encouraged to reach out is she needs anything     Patient's Address:   [facility] [address] [facility] [address]  **  If this is not the address patient will receive services - alert team and address in EMR**       Patient Contacts:  Extended Emergency Contact Information  Primary Emergency Contact: Shayan Thompson  Home Phone: [phone]  Work Phone: [phone]  Relation: Spouse                                Patient's Preferred Phone: [phone]  Patient's E-mail: [email or web address]

## 2024-03-22 ENCOUNTER — APPOINTMENT (OUTPATIENT)
Dept: PHARMACY | Facility: HOSPITAL | Age: 59
End: 2024-03-22
Payer: COMMERCIAL

## 2024-03-23 ENCOUNTER — PATIENT OUTREACH (OUTPATIENT)
Dept: HOME HEALTH SERVICES | Age: 59
End: 2024-03-23
Payer: COMMERCIAL

## 2024-03-23 NOTE — PROGRESS NOTES
Daily Call Note:   3/23/24 Daily call completed. Patient stated that she had no complaints. Continues to monitor blood glucose and vitals daily. Denies any new or worsening symptoms. Educated on diet. Stated that she stills needs help with managing carbs/sugars and sodium intake. Reported that she has an appointment with Nutrition on April 1. Reminded on next weekly [facility] 3/26 @ 0800.  BG 14  /63  HR 86  O2 Sat 93% RA  Pt Education: Diet (ADA and Heart Healthy)  Barriers: None  Topics for Daily Review: Diet/ Monitoring vitals/blood glucose  Pt demonstrates clear understanding: Yes    Daily Weight:  There were no vitals filed for this visit.   Last 3 Weights:  Wt Readings from Last 7 Encounters:   03/15/24 79.8 kg (176 lb)   03/11/24 80.7 kg (178 lb)   03/11/24 78.9 kg (174 lb)   03/03/24 81.8 kg (180 lb 5.4 oz)   02/02/24 83.9 kg (184 lb 14.4 oz)   01/03/24 84.8 kg (187 lb)   10/11/23 81.6 kg (180 lb)       Masimo Device: No   Masimo Clinical Impression: NA    Virtual Visits--Scheduled (Most Recent Date at Top)  Follow up Appointments  Recent Visits  Date Type Provider Dept   03/11/24 Office Visit Raghav Garnett, DO Do [id number] [facility]   Showing recent visits within past 30 days and meeting all other requirements  Future Appointments  Date Type Provider Dept   06/10/24 Appointment Raghav Garnett DO Do [id number] [facility]   Showing future appointments within next 90 days and meeting all other requirements       Frequency of RN Calls & Virtual Visits per Team Agreement: [facility] Home Frequency: T Th Sat    Medication issues Addressed (what was done): No    Follow up appointments scheduled by [facility] Staff: None  Referrals made by [facility] staff: No      Acute [facility] Care Transitions Assessment    Patient's Address:   [facility] [address] [facility] [address]  **  If this is not the address patient will receive services - alert team and address in EMR**       Patient Contacts:  Extended Emergency Contact  Information  Primary Emergency Contact: Shayan Thompson  Home Phone: [phone]  Work Phone: [phone]  Relation: Spouse                                Patient's Preferred Phone: [phone]  Patient's E-mail: [email or web address]

## 2024-03-25 ENCOUNTER — TELEPHONE (OUTPATIENT)
Dept: NUTRITION | Facility: CLINIC | Age: 59
End: 2024-03-25
Payer: COMMERCIAL

## 2024-03-25 NOTE — TELEPHONE ENCOUNTER
GUERO will not be in office 4/1. GUERO called patient today to ask if she can move the appointment. She accepted 4/29 at 0900 in-person. A note was sent to the  to move the appointment.

## 2024-03-26 ENCOUNTER — PATIENT OUTREACH (OUTPATIENT)
Dept: HOME HEALTH SERVICES | Age: 59
End: 2024-03-26

## 2024-03-26 ENCOUNTER — TELEMEDICINE (OUTPATIENT)
Dept: PHARMACY | Facility: HOSPITAL | Age: 59
End: 2024-03-26
Payer: COMMERCIAL

## 2024-03-26 DIAGNOSIS — E11.9 TYPE 2 DIABETES MELLITUS WITHOUT COMPLICATION, WITHOUT LONG-TERM CURRENT USE OF INSULIN (MULTI): ICD-10-CM

## 2024-03-26 DIAGNOSIS — J20.9 COPD (CHRONIC OBSTRUCTIVE PULMONARY DISEASE) WITH ACUTE BRONCHITIS (MULTI): Primary | ICD-10-CM

## 2024-03-26 DIAGNOSIS — I10 HTN (HYPERTENSION), BENIGN: ICD-10-CM

## 2024-03-26 DIAGNOSIS — J44.0 COPD (CHRONIC OBSTRUCTIVE PULMONARY DISEASE) WITH ACUTE BRONCHITIS (MULTI): Primary | ICD-10-CM

## 2024-03-26 ASSESSMENT — ENCOUNTER SYMPTOMS
HYPERTENSION: 1
DIABETIC ASSOCIATED SYMPTOMS: 0

## 2024-03-26 NOTE — PROGRESS NOTES
Daily Call Note:  Weekly [facility] call complete w this RN, Seng Pharm D and Dr Watts.  Pt reports dry cough, and heavy feeling in chest.  Pt states if this does not improve she will call her Pulmonologist later today.  POX 90 % RA, B/P 120/74, .  Medications reviewed w Seng, no refills needed.  Next [facility] weekly call 04/02/24 at 0800.        Pt Education: POC   Barriers:   Topics for Daily Review:  VS/ BGM   Pt demonstrates clear understanding: Yes    Daily Weight:  There were no vitals filed for this visit.   Last 3 Weights:  Wt Readings from Last 7 Encounters:   03/15/24 79.8 kg (176 lb)   03/11/24 80.7 kg (178 lb)   03/11/24 78.9 kg (174 lb)   03/03/24 81.8 kg (180 lb 5.4 oz)   02/02/24 83.9 kg (184 lb 14.4 oz)   01/03/24 84.8 kg (187 lb)   10/11/23 81.6 kg (180 lb)       Masimo Device: No   Masimo Clinical Impression:     Virtual Visits--Scheduled (Most Recent Date at Top)  Follow up Appointments  Recent Visits  Date Type Provider Dept   03/11/24 Office Visit Raghav Garnett, DO Do [id number] [facility]   Showing recent visits within past 30 days and meeting all other requirements  Future Appointments  Date Type Provider Dept   06/10/24 Appointment Raghav Garnett, DO Do [id number] [facility]   Showing future appointments within next 90 days and meeting all other requirements       Frequency of RN Calls & Virtual Visits per Team Agreement: Healthy at Home Frequency: Bi-Weekly    Medication issues Addressed (what was done):     Follow up appointments scheduled by [facility] Staff:   Referrals made by [facility] staff:       [facility] Care Transitions Assessment    Patient's Address:   [facility] [address] [facility] [address]  **  If this is not the address patient will receive services - alert team and address in EMR**       Patient Contacts:  Extended Emergency Contact Information  Primary Emergency Contact: Shayan Thompson  Home Phone: [phone]  Work Phone: [phone]  Relation: Spouse                                 Patient's Preferred Phone: [phone]  Patient's E-mail: [email or web address]

## 2024-03-26 NOTE — PROGRESS NOTES
Pharmacy Post-Discharge Visit - Follow Up   Lauren Thompson is a 58 y.o. female was referred to Clinical Pharmacy Team to complete a post-discharge medication optimization and monitoring visit.  The patient was referred for their diabetes, blood pressure and COPD management while also enrolled in [facility]. Since our last visit, she has completed her cipro and also used all of the samples of yupelri she received from her pulmonologist. She was coughing quite a bit while on the phone with her, but says it is not very productive. She does have some chest tightness but gets relief with her breathing treatments and rescue inhaler. Her saturations have also still all been above 90% on room air and she continues to use the 2L during the night.       Referring Provider: Constantino Purdy DO  PCP: Raghav Garnett DO    Subjective   No Known Allergies    [facility]/pharmacy #[id number] [facility], [place] - [address] [place] [address] AT [facility]  [address] [place] [address]  Phone: [phone] Fax: [phone]    [facility] AT [facility]  [address] [address]  Phone: [phone] Fax: [phone]      Social History     Social History Narrative    Not on file          Hypertension  This is a chronic problem. The current episode started more than 1 year ago. The problem has been resolved since onset. The problem is controlled. Agents associated with hypertension include steroids and decongestants. Risk factors for coronary artery disease include diabetes mellitus. Past treatments include ACE inhibitors and diuretics. The current treatment provides significant improvement. There are no compliance problems.    Diabetes  She presents for her follow-up diabetic visit. She has type 2 diabetes mellitus. Her disease course has been stable. There are no hypoglycemic associated symptoms. There are no diabetic associated symptoms. There  are no hypoglycemic complications. There are no diabetic complications. Risk factors for coronary artery disease include diabetes mellitus. Current diabetic treatment includes insulin injections and oral agent (monotherapy). She is compliant with treatment all of the time. Her home blood glucose trend is fluctuating minimally. Her overall blood glucose range is 110-130 mg/dl. An ACE inhibitor/angiotensin II receptor blocker is being taken.     COPD ASSESSMENT     Rescue Inhaler Use:  -How many times per week do you use your rescue inhale? Has had to use almost daily this past week      Inhaler Technique:  -How do you use your rescue inhaler?  --as needed      -How do you use your maintenance inhaler?  --every morning      Secondary Prevention (vaccines):  -Influenza: Date [10/11/2023]  -PCV13: Date [n/a]  -PPSV23: Date [03/03/2016]     Sx Management:  -Increased cough? Yes   -Increased sputum production? no  -Increased SOB? Yes      Exacerbation Hx:  -When was your last hospitalization for an exacerbation? This past admission  -When was the last time you were treated with antibiotics and/or steroids? This past admission     Oxygen:  -90% room air this morning   -still wearing 2 L during the night   -not on yosvany    Review of Systems    Medication System Management:  Affordability/Accessibility: Medicaid   Adherence/Organization: no concerns   Adverse Effects: none    Objective     There were no vitals taken for this visit.     LAB  Lab Results   Component Value Date    BILITOT 0.3 03/03/2024    CALCIUM 8.7 03/04/2024    CO2 25 03/04/2024     03/04/2024    CREATININE 0.83 03/04/2024    GLUCOSE 177 (H) 03/04/2024    ALKPHOS 109 03/03/2024    K 4.0 03/04/2024    PROT 7.7 03/03/2024     03/04/2024    AST 19 03/03/2024    ALT 16 03/03/2024    BUN 20 03/04/2024    ANIONGAP 10 03/04/2024    MG 2.00 06/22/2022    PHOS 2.4 (L) 05/05/2021     02/14/2020    ALBUMIN 4.6 03/03/2024    AMYLASE 48 06/22/2022     LIPASE 41 06/22/2022    GFRF >90 07/11/2023     Lab Results   Component Value Date    TRIG 334 (H) 07/11/2023    CHOL 217 (H) 07/11/2023    HDL 39.7 (A) 07/11/2023     Lab Results   Component Value Date    HGBA1C 7.2 (H) 10/11/2023         Current Outpatient Medications on File Prior to Visit   Medication Sig Dispense Refill    albuterol (Ventolin HFA) 90 mcg/actuation inhaler INHALE 2 PUFFS BY MOUTH EVERY 4 HOURS 18 g 3    aspirin 81 mg EC tablet Take 1 tablet (81 mg) by mouth once daily.      atorvastatin (Lipitor) 80 mg tablet Take 1 tablet (80 mg) by mouth once daily. 90 tablet 3    blood sugar diagnostic (Blood Glucose Test) strip 1 each once daily. Start checking sugars at least first thing in the morning before eating 100 each 2    blood-glucose meter misc Use daily or as directed for monitoring of diabetes. 1 each 0    ciprofloxacin (Cipro) 500 mg tablet Take 1 tablet (500 mg) by mouth 2 times a day.      dulaglutide (Trulicity) 1.5 mg/0.5 mL pen injector injection Inject 1.5 mg under the skin 1 (one) time per week. 2 mL 11    ipratropium-albuteroL (Duo-Neb) 0.5-2.5 mg/3 mL nebulizer solution Take 3 mL by nebulization 4 times a day for 5 days. 60 mL 0    lancets misc 1 each once daily. 100 each 2    levothyroxine (Synthroid, Levoxyl) 88 mcg tablet TAKE 1 TABLET EVERY MORNING BEFORE MEAL 90 tablet 3    lisinopriL-hydrochlorothiazide 20-25 mg tablet Take 1 tablet by mouth once daily. 90 tablet 3    magnesium oxide (Mag-Ox) 400 mg (241.3 mg magnesium) tablet Take 1 tablet (400 mg) by mouth once daily. 90 tablet 3    metFORMIN XR (Glucophage-XR) 500 mg 24 hr tablet Take 1 tablet (500 mg) by mouth once daily with a meal. Do not crush, chew, or split. 90 tablet 3    metoprolol succinate XL (Toprol-XL) 25 mg 24 hr tablet Take 1 tablet (25 mg) by mouth once daily. 90 tablet 3    Mucus Relief  mg 12 hr tablet TAKE 1 OR 2 TABLETS BY MOUTH TWICE DAILY AS NEEDED FOR CONGESTION. 120 tablet 3    oxygen (O2) gas  therapy Inhale 2 each. As needed      ranolazine (Ranexa) 500 mg 12 hr tablet Take 1 tablet (500 mg) by mouth twice a day.      sertraline (Zoloft) 100 mg tablet TAKE 1 TABLET BY MOUTH EVERY DAY 90 tablet 3    Trelegy Ellipta 100-62.5-25 mcg blister with device INHALE 1 PUFF BY MOUTH DAILY. RINSE MOUTH AND SPIT AFTER USE.       No current facility-administered medications on file prior to visit.          Assessment/Plan   Problem List Items Addressed This Visit    None  COPD  Completed cipro   Continue trelegy daily   Albuterol inhaler and duo nebs as needed   Was 90% room air this morning   Wears 2L during the night   Used up all of the yupelri samples from [facility] office, but states that she felt they really helped --> did test claim and they would require a PA from insurance. Discussed that when she talks with [facility] again that for a prescription the PA would need to be completed first   HTN  BP's have been stable   BP today 120/74  Continue lisinopril-hydrochlorothiazide, metoprolol and ranolazine   DM  Was able to  new glucometer and testing supplies from her pharmacy and has been at least checking her fasting sugar every morning and sometimes in the evening   All sugars have been between 100-150   Continue metformin 500mg daily with breakfast   Continue trulicity 1.5mg once weekly   No complaints of any nausea or abdominal pain   Fasting sugar this morning was 115     Will follow up next week. No refills needed today     Continue all meds under the continuation of care with the referring provider and clinical pharmacy team.    Seng Cedeño, Alissa     Verbal consent to manage patient's drug therapy was obtained from [the patient and/or an individual authorized to act on behalf of a patient]. They were informed they may decline to participate or withdraw from participation in pharmacy services at any time.

## 2024-03-27 ENCOUNTER — APPOINTMENT (OUTPATIENT)
Dept: PRIMARY CARE | Facility: CLINIC | Age: 59
End: 2024-03-27
Payer: COMMERCIAL

## 2024-03-28 ENCOUNTER — PATIENT OUTREACH (OUTPATIENT)
Dept: HOME HEALTH SERVICES | Age: 59
End: 2024-03-28
Payer: COMMERCIAL

## 2024-03-28 NOTE — PROGRESS NOTES
Daily Call Note: [facility] daily call complete.  Pt denies concerns.  Reports heavy feeling in chest has improved.  Pt did speak w Pulmonologist and is awaiting prescription POX 92 %, , B/P 13/61, HR 86.    All questions answered.  Verified next [facility] weekly call.        Pt Education: POC   Barriers:   Topics for Daily Review:   Pt demonstrates clear understanding: Yes    Daily Weight:  There were no vitals filed for this visit.   Last 3 Weights:  Wt Readings from Last 7 Encounters:   03/15/24 79.8 kg (176 lb)   03/11/24 80.7 kg (178 lb)   03/11/24 78.9 kg (174 lb)   03/03/24 81.8 kg (180 lb 5.4 oz)   02/02/24 83.9 kg (184 lb 14.4 oz)   01/03/24 84.8 kg (187 lb)   10/11/23 81.6 kg (180 lb)       Masimo Device: No   Masimo Clinical Impression:     Virtual Visits--Scheduled (Most Recent Date at Top)  Follow up Appointments  Recent Visits  Date Type Provider Dept   03/11/24 Office Visit Raghav Garnett, DO Do [id number] [facility]   Showing recent visits within past 30 days and meeting all other requirements  Future Appointments  Date Type Provider Dept   06/10/24 Appointment Raghav Garnett DO Do [id number] [facility]   Showing future appointments within next 90 days and meeting all other requirements       Frequency of RN Calls & Virtual Visits per Team Agreement: Healthy at Home Frequency: Daily    Medication issues Addressed (what was done):     Follow up appointments scheduled by [facility] Staff:   Referrals made by [facility] staff:       [facility] Care Transitions Assessment    Patient's Address:   [address] [place] [address]  **  If this is not the address patient will receive services - alert team and address in EMR**       Patient Contacts:  Extended Emergency Contact Information  Primary Emergency Contact: JayShayan  Home Phone: [phone]  Work Phone: [phone]  Relation: Spouse                                Patient's Preferred Phone: [phone]  Patient's E-mail:  [email or web address]

## 2024-04-01 ENCOUNTER — APPOINTMENT (OUTPATIENT)
Dept: NUTRITION | Facility: CLINIC | Age: 59
End: 2024-04-01
Payer: COMMERCIAL

## 2024-04-04 ENCOUNTER — PATIENT OUTREACH (OUTPATIENT)
Dept: HOME HEALTH SERVICES | Age: 59
End: 2024-04-04
Payer: COMMERCIAL

## 2024-04-05 ENCOUNTER — PATIENT OUTREACH (OUTPATIENT)
Dept: CARE COORDINATION | Facility: CLINIC | Age: 59
End: 2024-04-05
Payer: COMMERCIAL

## 2024-04-05 DIAGNOSIS — E11.9 TYPE 2 DIABETES MELLITUS WITHOUT COMPLICATION, WITHOUT LONG-TERM CURRENT USE OF INSULIN (MULTI): Primary | ICD-10-CM

## 2024-04-05 NOTE — PROGRESS NOTES
Called and informed pt PCP sent prescription to [facility] in [place] for 3mg dulaglutide once weekly injection.  Pt denies any other questions or needs at this time.

## 2024-04-05 NOTE — PROGRESS NOTES
Call placed regarding one month post discharge follow up call.  At time of outreach call the patient feels as if their condition has improved since initial visit with PCP or specialist.  Questions or concerns regarding recovery period addressed at this time.   Reviewed any PCP or specialists progress notes/labs/radiology reports if applicable and addressed any questions or concerns.    -Verified next PCP appt 6/10/2024.    Pt would like to clarify dulaglutide dose- message sent to PCP.    Pt denies any other questions, needs, or concerns at this time. She is encouraged to call if questions or needs arise.

## 2024-04-06 ENCOUNTER — PATIENT OUTREACH (OUTPATIENT)
Dept: HOME HEALTH SERVICES | Age: 59
End: 2024-04-06
Payer: COMMERCIAL

## 2024-04-06 VITALS
WEIGHT: 169 LBS | DIASTOLIC BLOOD PRESSURE: 61 MMHG | SYSTOLIC BLOOD PRESSURE: 105 MMHG | BODY MASS INDEX: 35.33 KG/M2 | OXYGEN SATURATION: 92 %

## 2024-04-08 ENCOUNTER — PATIENT OUTREACH (OUTPATIENT)
Dept: HOME HEALTH SERVICES | Age: 59
End: 2024-04-08

## 2024-04-08 ENCOUNTER — TELEMEDICINE (OUTPATIENT)
Dept: PHARMACY | Facility: HOSPITAL | Age: 59
End: 2024-04-08
Payer: COMMERCIAL

## 2024-04-08 DIAGNOSIS — E11.9 TYPE 2 DIABETES MELLITUS WITHOUT COMPLICATION, WITHOUT LONG-TERM CURRENT USE OF INSULIN (MULTI): ICD-10-CM

## 2024-04-08 DIAGNOSIS — I10 HTN (HYPERTENSION), BENIGN: ICD-10-CM

## 2024-04-08 DIAGNOSIS — J44.9 COPD, SEVERE (MULTI): Primary | ICD-10-CM

## 2024-04-08 NOTE — PROGRESS NOTES
[facility] Care Transitions Assessment  Pt is doing good, no question on medication. Pt has enough refills til next pcp appointment. Pt bp 97/62, bs 113. Pt feels comfortable graduating from the program today.  Patient's Address:   [facility] [address] [facility] [address]  **  If this is not the address patient will receive services - alert team and address in EMR**       Patient Contacts:  Extended Emergency Contact Information  Primary Emergency Contact: Shayan Thompson  Home Phone: [phone]  Work Phone: [phone]  Relation: Spouse                                Patient's Preferred Phone: [phone]  Patient's E-mail: [email or web address]

## 2024-04-08 NOTE — PROGRESS NOTES
Pharmacy Post-Discharge Visit    Lauren Thompson is a 59 y.o. female was referred to Clinical Pharmacy Team to complete a post-discharge medication optimization and monitoring visit.  The patient was referred for their COPD, blood pressure and diabetes management while also enrolled in [facility].       Referring Provider: Naveen Watts MD  PCP: Raghav Garnett, DO - last visit: 3/11/24, next visit: 6/10      Subjective   No Known Allergies    [id number] - [place], [place] - [address] [facility] AT [address]  [address] [facility] [address]  Phone: [phone] Fax: [phone]    [facility] - [facility] - [facility] AT Portal to [facility]  [facility]  [place] [address]  Phone: [phone] Fax: [phone]      Medication System Management:  Affordability/Accessibility: no concerns   Adherence/Organization: no issues       Social History     Social History Narrative    Not on file          HPI  Hypertension  This is a chronic problem. The current episode started more than 1 year ago. The problem has been resolved since onset. The problem is controlled. Agents associated with hypertension include steroids and decongestants. Risk factors for coronary artery disease include diabetes mellitus. Past treatments include ACE inhibitors and diuretics. The current treatment provides significant improvement. There are no compliance problems.    Diabetes  She presents for her follow-up diabetic visit. She has type 2 diabetes mellitus. Her disease course has been stable. There are no hypoglycemic associated symptoms. There are no diabetic associated symptoms. There are no hypoglycemic complications. There are no diabetic complications. Risk factors for coronary artery disease include diabetes mellitus. Current diabetic treatment includes insulin injections and oral agent (monotherapy). She is compliant with treatment all of the time. Her  home blood glucose trend is fluctuating minimally. Her overall blood glucose range is 110-130 mg/dl. An ACE inhibitor/angiotensin II receptor blocker is being taken.      COPD ASSESSMENT     Rescue Inhaler Use:  -How many times per week do you use your rescue inhale? Only used about 1-2x since last visit      Inhaler Technique:  -How do you use your rescue inhaler?  --as needed      -How do you use your maintenance inhaler?  --every morning      Secondary Prevention (vaccines):  -Influenza: Date [10/11/2023]  -PCV13: Date [n/a]  -PPSV23: Date [03/03/2016]     Sx Management:  -Increased cough? no  -Increased sputum production? no  -Increased SOB? No      Exacerbation Hx:  -When was your last hospitalization for an exacerbation? This past admission  -When was the last time you were treated with antibiotics and/or steroids? This past admission      Oxygen:  -still wearing 2 L during the night   -not on yosvany    Review of Systems        Objective     There were no vitals taken for this visit.   BP Readings from Last 4 Encounters:   04/06/24 105/61   03/19/24 112/64   03/13/24 125/64   03/11/24 104/60      There were no vitals filed for this visit.     LAB  Lab Results   Component Value Date    BILITOT 0.3 03/03/2024    CALCIUM 8.7 03/04/2024    CO2 25 03/04/2024     03/04/2024    CREATININE 0.83 03/04/2024    GLUCOSE 177 (H) 03/04/2024    ALKPHOS 109 03/03/2024    K 4.0 03/04/2024    PROT 7.7 03/03/2024     03/04/2024    AST 19 03/03/2024    ALT 16 03/03/2024    BUN 20 03/04/2024    ANIONGAP 10 03/04/2024    MG 2.00 06/22/2022    PHOS 2.4 (L) 05/05/2021     02/14/2020    ALBUMIN 4.6 03/03/2024    AMYLASE 48 06/22/2022    LIPASE 41 06/22/2022    GFRF >90 07/11/2023     Lab Results   Component Value Date    TRIG 334 (H) 07/11/2023    CHOL 217 (H) 07/11/2023    HDL 39.7 (A) 07/11/2023     Lab Results   Component Value Date    HGBA1C 7.2 (H) 10/11/2023         Current Outpatient Medications   Medication  Instructions    albuterol (Ventolin HFA) 90 mcg/actuation inhaler INHALE 2 PUFFS BY MOUTH EVERY 4 HOURS    aspirin 81 mg, oral, Daily    atorvastatin (LIPITOR) 80 mg, oral, Daily    blood sugar diagnostic (Blood Glucose Test) strip 1 each, miscellaneous, Daily, Start checking sugars at least first thing in the morning before eating    blood-glucose meter misc Use daily or as directed for monitoring of diabetes.    dulaglutide 3 mg, subcutaneous, Once Weekly    ipratropium-albuteroL (Duo-Neb) 0.5-2.5 mg/3 mL nebulizer solution 3 mL, nebulization, 4 times daily RT    lancets misc 1 each, miscellaneous, Daily    levothyroxine (Synthroid, Levoxyl) 88 mcg tablet TAKE 1 TABLET EVERY MORNING BEFORE MEAL    lisinopriL-hydrochlorothiazide 20-25 mg tablet 1 tablet, oral, Daily    magnesium oxide (MAG-OX) 400 mg, oral, Daily    metFORMIN XR (GLUCOPHAGE-XR) 500 mg, oral, Daily with breakfast, Do not crush, chew, or split.    metoprolol succinate XL (TOPROL-XL) 25 mg, oral, Daily    Mucus Relief  mg 12 hr tablet TAKE 1 OR 2 TABLETS BY MOUTH TWICE DAILY AS NEEDED FOR CONGESTION.    oxygen (O2) gas therapy 2 L, inhalation, As needed    ranolazine (RANEXA) 500 mg, oral, 2 times daily    sertraline (ZOLOFT) 100 mg, oral, Daily    Trelegy Ellipta 100-62.5-25 mcg blister with device INHALE 1 PUFF BY MOUTH DAILY. RINSE MOUTH AND SPIT AFTER USE.    Trulicity 1.5 mg, subcutaneous, Once Weekly            Assessment/Plan   Problem List Items Addressed This Visit    None  COPD  Completed cipro   Continue trelegy daily   Albuterol inhaler and duo nebs as needed   Wears 2L during the night   Used up all of the yupelri samples from pulm office, but states that she felt they really helped --> did test claim and they would require a PA from insurance. Discussed that when she talks with pulm again that for a prescription the PA would need to be completed first   HTN  BP's have been stable   BP today 97/62, not feeling dizzy or lightheaded    Continue lisinopril-hydrochlorothiazide, metoprolol and ranolazine   DM  Was able to  new glucometer and testing supplies from her pharmacy and has been at least checking her fasting sugar every morning and sometimes in the evening   All sugars have been between 100-150   Fasting this morning was 113  Continue metformin 500mg daily with breakfast   Continue trulicity 1.5mg once weekly   No complaints of any nausea or abdominal pain     Overall, she has greatly improved over past 30+ days from hospital discharge. Her BP's and sugars are much more controlled and she has enough refills/medications until she sees pcp in June. The plan will be to graduate from [facility] today then and discussed to call in the future though with any questions or concerns you may have.     Follow Up: as needed     Continue all meds under the continuation of care with the referring provider and clinical pharmacy team.    Seng Cedeño, Alissa     Verbal consent to manage patient's drug therapy was obtained from the patient. They were informed they may decline to participate or withdraw from participation in pharmacy services at any time.

## 2024-04-16 ENCOUNTER — HOSPITAL ENCOUNTER (OUTPATIENT)
Dept: RADIOLOGY | Facility: HOSPITAL | Age: 59
Discharge: HOME | End: 2024-04-16
Payer: COMMERCIAL

## 2024-04-16 DIAGNOSIS — I65.29 OCCLUSION AND STENOSIS OF UNSPECIFIED CAROTID ARTERY: ICD-10-CM

## 2024-04-16 PROCEDURE — 93880 EXTRACRANIAL BILAT STUDY: CPT | Performed by: RADIOLOGY

## 2024-04-16 PROCEDURE — 93880 EXTRACRANIAL BILAT STUDY: CPT

## 2024-04-25 ENCOUNTER — OFFICE VISIT (OUTPATIENT)
Dept: VASCULAR SURGERY | Facility: CLINIC | Age: 59
End: 2024-04-25
Payer: COMMERCIAL

## 2024-04-25 VITALS
SYSTOLIC BLOOD PRESSURE: 110 MMHG | BODY MASS INDEX: 35.48 KG/M2 | DIASTOLIC BLOOD PRESSURE: 66 MMHG | OXYGEN SATURATION: 95 % | RESPIRATION RATE: 16 BRPM | WEIGHT: 169 LBS | HEART RATE: 104 BPM | HEIGHT: 58 IN | TEMPERATURE: 97.7 F

## 2024-04-25 DIAGNOSIS — I65.29 STENOSIS OF CAROTID ARTERY, UNSPECIFIED LATERALITY: Primary | ICD-10-CM

## 2024-04-25 PROCEDURE — 99212 OFFICE O/P EST SF 10 MIN: CPT | Performed by: SURGERY

## 2024-04-25 PROCEDURE — 1036F TOBACCO NON-USER: CPT | Performed by: SURGERY

## 2024-04-25 PROCEDURE — 3078F DIAST BP <80 MM HG: CPT | Performed by: SURGERY

## 2024-04-25 PROCEDURE — 3074F SYST BP LT 130 MM HG: CPT | Performed by: SURGERY

## 2024-04-25 NOTE — PROGRESS NOTES
Vascular Surgery Clinic Note    CC: carotid    HPI:  Lauren Thompson is 59 y.o. female with history of carotid stenosis. Duplex this year shows no significant stenosis, some atherosclerotic debris bilaterally.     Medical History:   has a past medical history of COPD (chronic obstructive pulmonary disease) (Multi), Coronary artery disease, Diabetes mellitus (Multi), and Hypertension.    Meds:   Current Outpatient Medications on File Prior to Visit   Medication Sig Dispense Refill    albuterol (Ventolin HFA) 90 mcg/actuation inhaler INHALE 2 PUFFS BY MOUTH EVERY 4 HOURS 18 g 3    aspirin 81 mg EC tablet Take 1 tablet (81 mg) by mouth once daily.      atorvastatin (Lipitor) 80 mg tablet Take 1 tablet (80 mg) by mouth once daily. 90 tablet 3    blood sugar diagnostic (Blood Glucose Test) strip 1 each once daily. Start checking sugars at least first thing in the morning before eating 100 each 2    blood-glucose meter misc Use daily or as directed for monitoring of diabetes. 1 each 0    dulaglutide 3 mg/0.5 mL pen injector Inject 3 mg under the skin 1 (one) time per week. 2 mL 11    lancets misc 1 each once daily. 100 each 2    levothyroxine (Synthroid, Levoxyl) 88 mcg tablet TAKE 1 TABLET EVERY MORNING BEFORE MEAL 90 tablet 3    lisinopriL-hydrochlorothiazide 20-25 mg tablet Take 1 tablet by mouth once daily. 90 tablet 3    magnesium oxide (Mag-Ox) 400 mg (241.3 mg magnesium) tablet Take 1 tablet (400 mg) by mouth once daily. 90 tablet 3    metFORMIN XR (Glucophage-XR) 500 mg 24 hr tablet Take 1 tablet (500 mg) by mouth once daily with a meal. Do not crush, chew, or split. 90 tablet 3    metoprolol succinate XL (Toprol-XL) 25 mg 24 hr tablet Take 1 tablet (25 mg) by mouth once daily. 90 tablet 3    Mucus Relief  mg 12 hr tablet TAKE 1 OR 2 TABLETS BY MOUTH TWICE DAILY AS NEEDED FOR CONGESTION. 120 tablet 3    oxygen (O2) gas therapy Inhale 2 each. As needed      ranolazine (Ranexa) 500 mg 12 hr tablet Take 1  tablet (500 mg) by mouth twice a day.      sertraline (Zoloft) 100 mg tablet TAKE 1 TABLET BY MOUTH EVERY DAY 90 tablet 3    Trelegy Ellipta 100-62.5-25 mcg blister with device INHALE 1 PUFF BY MOUTH DAILY. RINSE MOUTH AND SPIT AFTER USE.      dulaglutide (Trulicity) 1.5 mg/0.5 mL pen injector injection Inject 1.5 mg under the skin 1 (one) time per week. 2 mL 11    ipratropium-albuteroL (Duo-Neb) 0.5-2.5 mg/3 mL nebulizer solution Take 3 mL by nebulization 4 times a day for 5 days. 60 mL 0     No current facility-administered medications on file prior to visit.        Allergies:   No Known Allergies    SH:    Social Determinants of Health     Tobacco Use: Medium Risk (4/25/2024)    Patient History     Smoking Tobacco Use: Former     Smokeless Tobacco Use: Never     Passive Exposure: Not on file   Alcohol Use: Not At Risk (3/5/2024)    AUDIT-C     Frequency of Alcohol Consumption: Never     Average Number of Drinks: Patient does not drink     Frequency of Binge Drinking: Never   Financial Resource Strain: Low Risk  (3/5/2024)    Overall Financial Resource Strain (CARDIA)     Difficulty of Paying Living Expenses: Not hard at all   Food Insecurity: No Food Insecurity (3/5/2024)    Hunger Vital Sign     Worried About Running Out of Food in the Last Year: Never true     Ran Out of Food in the Last Year: Never true   Transportation Needs: No Transportation Needs (3/5/2024)    PRAPARE - Transportation     Lack of Transportation (Medical): No     Lack of Transportation (Non-Medical): No   Recent Concern: Transportation Needs - Unmet Transportation Needs (3/3/2024)    PRAPARE - Transportation     Lack of Transportation (Medical): Yes     Lack of Transportation (Non-Medical): Yes   Physical Activity: Insufficiently Active (3/5/2024)    Exercise Vital Sign     Days of Exercise per Week: 2 days     Minutes of Exercise per Session: 10 min   Stress: No Stress Concern Present (3/5/2024)    [employer]  - Occupational Stress Questionnaire     Feeling of Stress : Not at all   Social Connections: Moderately Isolated (3/5/2024)    Social Connection and Isolation Panel [NHANES]     Frequency of Communication with Friends and Family: Three times a week     Frequency of Social Gatherings with Friends and Family: Once a week     Attends Jainism Services: More than 4 times per year     Active Member of Clubs or Organizations: No     Attends Club or Organization Meetings: Never     Marital Status:    Intimate Partner Violence: Not At Risk (3/5/2024)    Humiliation, Afraid, Rape, and Kick questionnaire     Fear of Current or Ex-Partner: No     Emotionally Abused: No     Physically Abused: No     Sexually Abused: No   Depression: Not at risk (3/3/2024)    PHQ-2     PHQ-2 Score: 2   Housing Stability: Low Risk  (3/5/2024)    Housing Stability Vital Sign     Unable to Pay for Housing in the Last Year: No     Number of Places Lived in the Last Year: 1     Unstable Housing in the Last Year: No   Recent Concern: Housing Stability - High Risk (3/3/2024)    Housing Stability Vital Sign     Unable to Pay for Housing in the Last Year: Yes     Number of Places Lived in the Last Year: 1     Unstable Housing in the Last Year: Yes   Utilities: Not At Risk (3/5/2024)    [facility] Utilities     Threatened with loss of utilities: No   Digital Equity: Not on file   Health Literacy: Not on file        FH:  Family History   Problem Relation Name Age of Onset    Other (Cardiac disorder) Mother Justyna Stottlemire     Other (CVA) Mother Justyna Stottlemire     Hypertension Mother Justyna Stottlemire     Other (Diabetes mellitus) Mother Justyna Stottlemire     Heart disease Mother Justyna Stottlemire     Kidney disease Mother Justyna Stottlemire     Stroke Mother Justyna Stottlemire     Cancer Father Oswaldo Knisley     Heart disease Father Oswaldo Mariiskarina     Hypertension Father Oswaldo Knisley     Lung disease Father Oswaldo Knisley     Stroke Father Oswaldo Juan Diego      Heart disease Maternal Grandmother Sweetie Clay     Kidney disease Maternal Grandmother Sweetie Clay     Cancer Paternal Grandmother Cheyanne Arreaga     Cancer Sister Sunshine [place]     Depression Sister Sunshine Claudia     Depression Sister Jacquie Carl     Diabetes type II Sister Jacquie Carl     Hypertension Sister Jacquie Carl     Obesity Sister Jacquie Carl     Depression Sister Priya [place]     Diabetes type I Sister Lisa De Leon     Hypertension Sister Lisa De Leon     Thyroid disease Sister Lisa De Leon     Heart attack Sister Sweetie Saenz     Heart disease Sister Sweetie Saenz     Hypertension Sister Sweetie Saenz     Obesity Sister Sweetie Saenz     Hypertension Sister Miley Hejuan m     Stroke Sister Miley Power         ROS:  All systems were reviewed and are negative except as per HPI.    Objective:  Vitals:  Vitals:    04/25/24 1401   BP: 110/66   Pulse: 104   Resp: 16   Temp: 36.5 °C (97.7 °F)   SpO2: 95%        Exam:  In NAD, well appearing  Abd Soft, ND/NT  Vascular examination:  Palpable radial pulses    Assessment & Plan:  Lauren Thompson is 59 y.o. female with minimal carotid stenosis and no symptoms. I advised her to repeat the duplex in 5 years.      I spent a total of 10 minutes on the day of the visit.         Lester Lizarraga M.D.

## 2024-04-29 ENCOUNTER — APPOINTMENT (OUTPATIENT)
Dept: NUTRITION | Facility: CLINIC | Age: 59
End: 2024-04-29
Payer: COMMERCIAL

## 2024-05-01 ENCOUNTER — TELEPHONE (OUTPATIENT)
Dept: PRIMARY CARE | Facility: CLINIC | Age: 59
End: 2024-05-01
Payer: COMMERCIAL

## 2024-05-01 DIAGNOSIS — E11.9 TYPE 2 DIABETES MELLITUS WITHOUT COMPLICATION, WITHOUT LONG-TERM CURRENT USE OF INSULIN (MULTI): Primary | ICD-10-CM

## 2024-05-01 NOTE — TELEPHONE ENCOUNTER
Pt called and would like to know if Trulicity can be raised and sent in. She said the 3mg dose is not available anywhere.

## 2024-05-02 RX ORDER — DULAGLUTIDE 4.5 MG/.5ML
4.5 INJECTION, SOLUTION SUBCUTANEOUS
Qty: 2 ML | Refills: 11 | Status: SHIPPED | OUTPATIENT
Start: 2024-05-05

## 2024-05-02 NOTE — TELEPHONE ENCOUNTER
I sent in the 4.5 if she cannot find it I will send in the 1.5 mg dose and I will have her do 2 shot once a week to equal 3mg

## 2024-05-06 ENCOUNTER — PATIENT MESSAGE (OUTPATIENT)
Dept: PRIMARY CARE | Facility: CLINIC | Age: 59
End: 2024-05-06
Payer: COMMERCIAL

## 2024-05-06 DIAGNOSIS — E11.9 TYPE 2 DIABETES MELLITUS WITHOUT COMPLICATION, WITHOUT LONG-TERM CURRENT USE OF INSULIN (MULTI): Primary | ICD-10-CM

## 2024-05-08 RX ORDER — TIRZEPATIDE 2.5 MG/.5ML
2.5 INJECTION, SOLUTION SUBCUTANEOUS
Qty: 2 ML | Refills: 3 | Status: SHIPPED | OUTPATIENT
Start: 2024-05-12 | End: 2024-06-10 | Stop reason: ALTCHOICE

## 2024-05-13 ENCOUNTER — LAB (OUTPATIENT)
Dept: LAB | Facility: LAB | Age: 59
End: 2024-05-13
Payer: COMMERCIAL

## 2024-05-13 DIAGNOSIS — E78.2 MIXED HYPERLIPIDEMIA: ICD-10-CM

## 2024-05-13 DIAGNOSIS — I25.10 CORONARY ARTERY DISEASE INVOLVING NATIVE CORONARY ARTERY OF NATIVE HEART WITHOUT ANGINA PECTORIS: ICD-10-CM

## 2024-05-13 LAB
CHOLEST SERPL-MCNC: 140 MG/DL (ref 0–199)
CHOLESTEROL/HDL RATIO: 4.1
HDLC SERPL-MCNC: 33.9 MG/DL
LDLC SERPL CALC-MCNC: 58 MG/DL
NON HDL CHOLESTEROL: 106 MG/DL (ref 0–149)
TRIGL SERPL-MCNC: 242 MG/DL (ref 0–149)
VLDL: 48 MG/DL (ref 0–40)

## 2024-05-13 PROCEDURE — 36415 COLL VENOUS BLD VENIPUNCTURE: CPT

## 2024-05-13 PROCEDURE — 80061 LIPID PANEL: CPT

## 2024-05-14 ENCOUNTER — TELEPHONE (OUTPATIENT)
Dept: CARDIOLOGY | Facility: CLINIC | Age: 59
End: 2024-05-14
Payer: COMMERCIAL

## 2024-05-14 NOTE — TELEPHONE ENCOUNTER
----- Message from Sofiya Stahl LPN sent at 5/14/2024  1:07 PM EDT -----    ----- Message -----  From: Jean Faith MD  Sent: 5/14/2024  12:50 PM EDT  To: Sofiya Stahl LPN    Chol much better  ----- Message -----  From: Lab, Background User  Sent: 5/13/2024  12:07 PM EDT  To: Jean Faith MD

## 2024-05-23 ENCOUNTER — PATIENT OUTREACH (OUTPATIENT)
Dept: CARE COORDINATION | Facility: CLINIC | Age: 59
End: 2024-05-23
Payer: COMMERCIAL

## 2024-05-23 DIAGNOSIS — J40 BRONCHITIS, NOT SPECIFIED AS ACUTE OR CHRONIC: ICD-10-CM

## 2024-05-23 RX ORDER — ALBUTEROL SULFATE 90 UG/1
2 AEROSOL, METERED RESPIRATORY (INHALATION) EVERY 4 HOURS
Qty: 18 G | Refills: 3 | Status: SHIPPED | OUTPATIENT
Start: 2024-05-23

## 2024-05-23 NOTE — PROGRESS NOTES
90 day post discharge follow up call complete. Pt reports that she is doing well. She states she did have a flare up and just finished her antibiotics and steroids two days ago. She states she is feeling better.  Pt reports she is going to need a refill on her rescue inhaler; message sent to PCP.   Pt denies any other current needs from PCP.    -Verified next PCP appt 6/10/2024 4470    Pt denies any questions, needs, or concerns at this time. Pt encouraged to call if questions or needs arise.

## 2024-06-10 ENCOUNTER — OFFICE VISIT (OUTPATIENT)
Dept: PRIMARY CARE | Facility: CLINIC | Age: 59
End: 2024-06-10
Payer: COMMERCIAL

## 2024-06-10 VITALS
RESPIRATION RATE: 19 BRPM | SYSTOLIC BLOOD PRESSURE: 102 MMHG | BODY MASS INDEX: 35.26 KG/M2 | HEIGHT: 58 IN | HEART RATE: 94 BPM | WEIGHT: 168 LBS | DIASTOLIC BLOOD PRESSURE: 62 MMHG | OXYGEN SATURATION: 90 % | TEMPERATURE: 97.1 F

## 2024-06-10 DIAGNOSIS — F41.9 ANXIETY: ICD-10-CM

## 2024-06-10 DIAGNOSIS — E11.9 TYPE 2 DIABETES MELLITUS WITHOUT COMPLICATION, WITHOUT LONG-TERM CURRENT USE OF INSULIN (MULTI): ICD-10-CM

## 2024-06-10 DIAGNOSIS — J20.9 COPD (CHRONIC OBSTRUCTIVE PULMONARY DISEASE) WITH ACUTE BRONCHITIS (MULTI): ICD-10-CM

## 2024-06-10 DIAGNOSIS — J44.0 COPD (CHRONIC OBSTRUCTIVE PULMONARY DISEASE) WITH ACUTE BRONCHITIS (MULTI): ICD-10-CM

## 2024-06-10 DIAGNOSIS — J44.9 COPD, SEVERE (MULTI): Primary | ICD-10-CM

## 2024-06-10 LAB — POC HEMOGLOBIN A1C: 6 % (ref 4.2–6.5)

## 2024-06-10 PROCEDURE — 83036 HEMOGLOBIN GLYCOSYLATED A1C: CPT | Performed by: FAMILY MEDICINE

## 2024-06-10 PROCEDURE — 99214 OFFICE O/P EST MOD 30 MIN: CPT | Performed by: FAMILY MEDICINE

## 2024-06-10 RX ORDER — DULAGLUTIDE 1.5 MG/.5ML
3 INJECTION, SOLUTION SUBCUTANEOUS
Qty: 4 ML | Refills: 11 | Status: SHIPPED | OUTPATIENT
Start: 2024-06-10 | End: 2024-06-12

## 2024-06-10 RX ORDER — IPRATROPIUM BROMIDE AND ALBUTEROL SULFATE 2.5; .5 MG/3ML; MG/3ML
3 SOLUTION RESPIRATORY (INHALATION)
Qty: 360 ML | Refills: 11 | Status: SHIPPED | OUTPATIENT
Start: 2024-06-10 | End: 2025-06-10

## 2024-06-10 ASSESSMENT — ENCOUNTER SYMPTOMS
PALPITATIONS: 0
CHEST TIGHTNESS: 1
HEADACHES: 0
MUSCULOSKELETAL NEGATIVE: 1
ENDOCRINE NEGATIVE: 1
COUGH: 1
DIZZINESS: 0
CARDIOVASCULAR NEGATIVE: 1
SHORTNESS OF BREATH: 1
HEMATOLOGIC/LYMPHATIC NEGATIVE: 1
NERVOUS/ANXIOUS: 1
FATIGUE: 1

## 2024-06-10 NOTE — PROGRESS NOTES
"Subjective   Patient ID: Lauren Thompson is a 59 y.o. female who presents for Med Refill (3 month med check.Pt states she is taking Zoloft for anxiety but she feels it is not working as well. She states COPD flare ups are increasing and this is causing anxiety to increase.  ).    HPI     Review of Systems   Constitutional:  Positive for fatigue.   HENT: Negative.     Respiratory:  Positive for cough, chest tightness and shortness of breath.    Cardiovascular: Negative.  Negative for chest pain and palpitations.   Endocrine: Negative.    Musculoskeletal: Negative.    Skin: Negative.    Neurological:  Negative for dizziness and headaches.   Hematological: Negative.    Psychiatric/Behavioral:  The patient is nervous/anxious.        Objective   /62   Pulse 94   Temp 36.2 °C (97.1 °F)   Resp 19   Ht 1.473 m (4' 10\")   Wt 76.2 kg (168 lb)   SpO2 90%   BMI 35.11 kg/m²     Physical Exam  Constitutional:       Appearance: Normal appearance.   Cardiovascular:      Rate and Rhythm: Normal rate and regular rhythm.   Pulmonary:      Effort: Pulmonary effort is normal.      Breath sounds: Decreased air movement present. Rhonchi present.   Neurological:      General: No focal deficit present.      Mental Status: She is alert and oriented to person, place, and time.   Psychiatric:         Mood and Affect: Mood is anxious.         Speech: Speech normal.         Behavior: Behavior normal.         Thought Content: Thought content does not include homicidal or suicidal ideation.         Cognition and Memory: Cognition normal.       Assessment/Plan   Problem List Items Addressed This Visit             ICD-10-CM    Anxiety F41.9    Relevant Orders    Follow Up In [facility] - PCP    COPD, severe (Multi) - Primary J44.9    Relevant Orders    Follow Up In [facility] - PCP    Type 2 diabetes mellitus without complication, without long-term current use of insulin (Multi) E11.9    Relevant Medications    " dulaglutide (Trulicity) 1.5 mg/0.5 mL pen injector injection    Other Relevant Orders    Follow Up In [facility] - PCP    POCT glycosylated hemoglobin (Hb A1C) manually resulted (Completed)     Other Visit Diagnoses         Codes    COPD (chronic obstructive pulmonary disease) with acute bronchitis (Multi)     J44.0, J20.9    Relevant Medications    ipratropium-albuteroL (Duo-Neb) 0.5-2.5 mg/3 mL nebulizer solution    Other Relevant Orders    Follow Up In [facility] - PCP        Pt to try breathing through nose and slowly through mouth when anxiety hits. If no relief consider increase in zoloft

## 2024-09-09 ENCOUNTER — LAB (OUTPATIENT)
Dept: LAB | Facility: LAB | Age: 59
End: 2024-09-09
Payer: MEDICARE

## 2024-09-09 ENCOUNTER — APPOINTMENT (OUTPATIENT)
Dept: PRIMARY CARE | Facility: CLINIC | Age: 59
End: 2024-09-09
Payer: COMMERCIAL

## 2024-09-09 VITALS
WEIGHT: 169 LBS | DIASTOLIC BLOOD PRESSURE: 68 MMHG | TEMPERATURE: 98 F | BODY MASS INDEX: 35.48 KG/M2 | HEIGHT: 58 IN | RESPIRATION RATE: 18 BRPM | HEART RATE: 84 BPM | SYSTOLIC BLOOD PRESSURE: 112 MMHG | OXYGEN SATURATION: 95 %

## 2024-09-09 DIAGNOSIS — Z00.00 WELL ADULT EXAM: ICD-10-CM

## 2024-09-09 DIAGNOSIS — E66.01 OBESITY, MORBID (MULTI): ICD-10-CM

## 2024-09-09 DIAGNOSIS — E11.9 TYPE 2 DIABETES MELLITUS WITHOUT COMPLICATION, WITHOUT LONG-TERM CURRENT USE OF INSULIN (MULTI): ICD-10-CM

## 2024-09-09 DIAGNOSIS — F41.9 ANXIETY: ICD-10-CM

## 2024-09-09 DIAGNOSIS — Z00.00 MEDICARE ANNUAL WELLNESS VISIT, SUBSEQUENT: ICD-10-CM

## 2024-09-09 DIAGNOSIS — Z12.31 ENCOUNTER FOR SCREENING MAMMOGRAM FOR MALIGNANT NEOPLASM OF BREAST: ICD-10-CM

## 2024-09-09 DIAGNOSIS — Z00.00 MEDICARE ANNUAL WELLNESS VISIT, SUBSEQUENT: Primary | ICD-10-CM

## 2024-09-09 DIAGNOSIS — J44.9 COPD, SEVERE (MULTI): ICD-10-CM

## 2024-09-09 LAB
ALBUMIN SERPL BCP-MCNC: 4.5 G/DL (ref 3.4–5)
ALP SERPL-CCNC: 119 U/L (ref 33–136)
ALT SERPL W P-5'-P-CCNC: 16 U/L (ref 7–45)
ANION GAP SERPL CALC-SCNC: 13 MMOL/L (ref 10–20)
AST SERPL W P-5'-P-CCNC: 16 U/L (ref 9–39)
BASOPHILS # BLD AUTO: 0.07 X10*3/UL (ref 0–0.1)
BASOPHILS NFR BLD AUTO: 0.8 %
BILIRUB SERPL-MCNC: 0.3 MG/DL (ref 0–1.2)
BUN SERPL-MCNC: 22 MG/DL (ref 6–23)
CALCIUM SERPL-MCNC: 9.8 MG/DL (ref 8.6–10.3)
CHLORIDE SERPL-SCNC: 101 MMOL/L (ref 98–107)
CHOLEST SERPL-MCNC: 183 MG/DL (ref 0–199)
CHOLESTEROL/HDL RATIO: 4.3
CO2 SERPL-SCNC: 29 MMOL/L (ref 21–32)
CREAT SERPL-MCNC: 0.9 MG/DL (ref 0.5–1.05)
CREAT UR-MCNC: 43.9 MG/DL (ref 20–320)
EGFRCR SERPLBLD CKD-EPI 2021: 73 ML/MIN/1.73M*2
EOSINOPHIL # BLD AUTO: 0.59 X10*3/UL (ref 0–0.7)
EOSINOPHIL NFR BLD AUTO: 7.1 %
ERYTHROCYTE [DISTWIDTH] IN BLOOD BY AUTOMATED COUNT: 12.8 % (ref 11.5–14.5)
EST. AVERAGE GLUCOSE BLD GHB EST-MCNC: 128 MG/DL
GLUCOSE SERPL-MCNC: 84 MG/DL (ref 74–99)
HBA1C MFR BLD: 6.1 %
HCT VFR BLD AUTO: 40.8 % (ref 36–46)
HDLC SERPL-MCNC: 42.1 MG/DL
HGB BLD-MCNC: 13.4 G/DL (ref 12–16)
IMM GRANULOCYTES # BLD AUTO: 0.04 X10*3/UL (ref 0–0.7)
IMM GRANULOCYTES NFR BLD AUTO: 0.5 % (ref 0–0.9)
LDLC SERPL CALC-MCNC: 82 MG/DL
LYMPHOCYTES # BLD AUTO: 3.56 X10*3/UL (ref 1.2–4.8)
LYMPHOCYTES NFR BLD AUTO: 42.6 %
MCH RBC QN AUTO: 30.4 PG (ref 26–34)
MCHC RBC AUTO-ENTMCNC: 32.8 G/DL (ref 32–36)
MCV RBC AUTO: 93 FL (ref 80–100)
MICROALBUMIN UR-MCNC: <7 MG/L
MICROALBUMIN/CREAT UR: NORMAL MG/G{CREAT}
MONOCYTES # BLD AUTO: 0.75 X10*3/UL (ref 0.1–1)
MONOCYTES NFR BLD AUTO: 9 %
NEUTROPHILS # BLD AUTO: 3.34 X10*3/UL (ref 1.2–7.7)
NEUTROPHILS NFR BLD AUTO: 40 %
NON HDL CHOLESTEROL: 141 MG/DL (ref 0–149)
NRBC BLD-RTO: 0 /100 WBCS (ref 0–0)
PLATELET # BLD AUTO: 344 X10*3/UL (ref 150–450)
POTASSIUM SERPL-SCNC: 4.9 MMOL/L (ref 3.5–5.3)
PROT SERPL-MCNC: 6.9 G/DL (ref 6.4–8.2)
RBC # BLD AUTO: 4.41 X10*6/UL (ref 4–5.2)
SODIUM SERPL-SCNC: 138 MMOL/L (ref 136–145)
T4 FREE SERPL-MCNC: 0.87 NG/DL (ref 0.61–1.12)
TRIGL SERPL-MCNC: 294 MG/DL (ref 0–149)
TSH SERPL-ACNC: 3.71 MIU/L (ref 0.44–3.98)
VLDL: 59 MG/DL (ref 0–40)
WBC # BLD AUTO: 8.4 X10*3/UL (ref 4.4–11.3)

## 2024-09-09 PROCEDURE — 82043 UR ALBUMIN QUANTITATIVE: CPT

## 2024-09-09 PROCEDURE — 1036F TOBACCO NON-USER: CPT | Performed by: FAMILY MEDICINE

## 2024-09-09 PROCEDURE — 85025 COMPLETE CBC W/AUTO DIFF WBC: CPT

## 2024-09-09 PROCEDURE — 84439 ASSAY OF FREE THYROXINE: CPT

## 2024-09-09 PROCEDURE — 83036 HEMOGLOBIN GLYCOSYLATED A1C: CPT

## 2024-09-09 PROCEDURE — 3074F SYST BP LT 130 MM HG: CPT | Performed by: FAMILY MEDICINE

## 2024-09-09 PROCEDURE — 82570 ASSAY OF URINE CREATININE: CPT

## 2024-09-09 PROCEDURE — 36415 COLL VENOUS BLD VENIPUNCTURE: CPT

## 2024-09-09 PROCEDURE — G0439 PPPS, SUBSEQ VISIT: HCPCS | Performed by: FAMILY MEDICINE

## 2024-09-09 PROCEDURE — 80061 LIPID PANEL: CPT

## 2024-09-09 PROCEDURE — 3078F DIAST BP <80 MM HG: CPT | Performed by: FAMILY MEDICINE

## 2024-09-09 PROCEDURE — 3048F LDL-C <100 MG/DL: CPT | Performed by: FAMILY MEDICINE

## 2024-09-09 PROCEDURE — 80053 COMPREHEN METABOLIC PANEL: CPT

## 2024-09-09 PROCEDURE — 84443 ASSAY THYROID STIM HORMONE: CPT

## 2024-09-09 PROCEDURE — 3008F BODY MASS INDEX DOCD: CPT | Performed by: FAMILY MEDICINE

## 2024-09-09 PROCEDURE — 99396 PREV VISIT EST AGE 40-64: CPT | Performed by: FAMILY MEDICINE

## 2024-09-09 RX ORDER — DULAGLUTIDE 4.5 MG/.5ML
4.5 INJECTION, SOLUTION SUBCUTANEOUS
Qty: 2 ML | Refills: 11 | Status: SHIPPED | OUTPATIENT
Start: 2024-09-09

## 2024-09-09 RX ORDER — DOXYCYCLINE 100 MG/1
100 CAPSULE ORAL
Start: 2024-09-09 | End: 2024-11-08

## 2024-09-09 RX ORDER — ALBUTEROL SULFATE 90 UG/1
2 INHALANT RESPIRATORY (INHALATION) EVERY 4 HOURS PRN
Qty: 18 G | Refills: 5 | Status: SHIPPED | OUTPATIENT
Start: 2024-09-09 | End: 2025-09-09

## 2024-09-09 ASSESSMENT — ENCOUNTER SYMPTOMS
DIZZINESS: 0
NUMBNESS: 0
FATIGUE: 0
POLYPHAGIA: 0
ARTHRALGIAS: 0
HEADACHES: 0
COUGH: 1
DIARRHEA: 0
VOMITING: 0
WEAKNESS: 0
SHORTNESS OF BREATH: 1
POLYDIPSIA: 0
CHEST TIGHTNESS: 0
NAUSEA: 0
APPETITE CHANGE: 0
MYALGIAS: 0
FREQUENCY: 0

## 2024-09-09 ASSESSMENT — ACTIVITIES OF DAILY LIVING (ADL)
DOING_HOUSEWORK: INDEPENDENT
TAKING_MEDICATION: INDEPENDENT
MANAGING_FINANCES: INDEPENDENT
GROCERY_SHOPPING: INDEPENDENT
BATHING: INDEPENDENT
DRESSING: INDEPENDENT

## 2024-09-09 NOTE — PROGRESS NOTES
"Subjective   Reason for Visit: Lauren Thompson is an 59 y.o. female here for a Medicare Wellness visit.     Past Medical, Surgical, and Family History reviewed and updated in chart.    Reviewed all medications by prescribing practitioner or clinical pharmacist (such as prescriptions, OTCs, herbal therapies and supplements) and documented in the medical record.    HPI    Patient Care Team:  Raghav Garnett DO as PCP - General  Raghav Garnett DO as PCP - [facility] PCP  Jean Faith MD as Cardiologist (Cardiology)     Review of Systems   Constitutional:  Negative for appetite change and fatigue.   Eyes:  Negative for visual disturbance.   Respiratory:  Positive for cough and shortness of breath. Negative for chest tightness.    Cardiovascular:  Negative for chest pain and leg swelling.   Gastrointestinal:  Negative for diarrhea, nausea and vomiting.   Endocrine: Negative for polydipsia, polyphagia and polyuria.   Genitourinary:  Negative for frequency and urgency.   Musculoskeletal:  Negative for arthralgias and myalgias.   Neurological:  Negative for dizziness, syncope, weakness, numbness and headaches.       Objective   Vitals:  /68   Pulse 84   Temp 36.7 °C (98 °F)   Resp 18   Ht 1.473 m (4' 10\")   Wt 76.7 kg (169 lb)   SpO2 95%   BMI 35.32 kg/m²       Physical Exam  Constitutional:       Appearance: Normal appearance.   HENT:      Head: Normocephalic.      Right Ear: Tympanic membrane, ear canal and external ear normal.      Left Ear: Tympanic membrane, ear canal and external ear normal.      Nose: Nose normal.      Mouth/Throat:      Mouth: Mucous membranes are moist.      Pharynx: Oropharynx is clear.   Eyes:      Conjunctiva/sclera: Conjunctivae normal.   Cardiovascular:      Rate and Rhythm: Normal rate and regular rhythm.   Pulmonary:      Effort: Pulmonary effort is normal.      Breath sounds: Rhonchi present.   Musculoskeletal:         General: Normal range of motion.      " Cervical back: Neck supple.   Skin:     General: Skin is warm and dry.   Neurological:      General: No focal deficit present.      Mental Status: She is alert and oriented to person, place, and time.   Psychiatric:         Mood and Affect: Mood normal.         Assessment & Plan  Medicare annual wellness visit, subsequent    Orders:    Follow Up In [facility] - PCP; Future    Follow Up In [facility] - PCP; Future    CBC and Auto Differential; Future    Comprehensive Metabolic Panel; Future    Hemoglobin A1C; Future    Lipid Panel; Future    Thyroid Stimulating Hormone; Future    T4, free; Future    Albumin-Creatinine Ratio, Urine Random; Future    Well adult exam    Orders:    Follow Up In [facility] - PCP; Future    CBC and Auto Differential; Future    Comprehensive Metabolic Panel; Future    Hemoglobin A1C; Future    Lipid Panel; Future    Thyroid Stimulating Hormone; Future    T4, free; Future    Albumin-Creatinine Ratio, Urine Random; Future    Anxiety    Orders:    Follow Up In [facility] - PCP; Future    Follow Up In [facility] - PCP    CBC and Auto Differential; Future    Comprehensive Metabolic Panel; Future    Hemoglobin A1C; Future    Lipid Panel; Future    Thyroid Stimulating Hormone; Future    T4, free; Future    Albumin-Creatinine Ratio, Urine Random; Future    Type 2 diabetes mellitus without complication, without long-term current use of insulin (Multi)    Orders:    Follow Up In [facility] - PCP; Future    Follow Up In [facility] - PCP; Future    Follow Up In [facility] - PCP    CBC and Auto Differential; Future    Comprehensive Metabolic Panel; Future    Hemoglobin A1C; Future    Lipid Panel; Future    Thyroid Stimulating Hormone; Future    T4, free; Future    Albumin-Creatinine Ratio, Urine Random; Future    dulaglutide (Trulicity) 4.5 mg/0.5 mL pen injector; Inject 4.5 mg under the skin 1 (one) time per  week.    COPD, severe (Multi)    Orders:    Follow Up In [facility] - PCP; Future    Follow Up In [facility] - PCP    CBC and Auto Differential; Future    Comprehensive Metabolic Panel; Future    Hemoglobin A1C; Future    Lipid Panel; Future    Thyroid Stimulating Hormone; Future    T4, free; Future    Albumin-Creatinine Ratio, Urine Random; Future    albuterol (Ventolin HFA) 90 mcg/actuation inhaler; Inhale 2 puffs every 4 hours if needed for wheezing or shortness of breath. Dispense ventolin not proair    doxycycline (Vibramycin) 100 mg capsule; Take 1 capsule (100 mg) by mouth once a day on Monday, Wednesday, and Friday. Take with at least 8 ounces (large glass) of water, do not lie down for 30 minutes after    Obesity, morbid (Multi)  Dx reviewed discussed healthy diet and exercise    Orders:    Follow Up In [facility] - PCP; Future    CBC and Auto Differential; Future    Comprehensive Metabolic Panel; Future    Hemoglobin A1C; Future    Lipid Panel; Future    Thyroid Stimulating Hormone; Future    T4, free; Future    Albumin-Creatinine Ratio, Urine Random; Future    Encounter for screening mammogram for malignant neoplasm of breast    Orders:    Follow Up In [facility] - PCP; Future    BI mammo bilateral screening tomosynthesis; Future    CBC and Auto Differential; Future    Comprehensive Metabolic Panel; Future    Hemoglobin A1C; Future    Lipid Panel; Future    Thyroid Stimulating Hormone; Future    T4, free; Future    Albumin-Creatinine Ratio, Urine Random; Future

## 2024-09-09 NOTE — ASSESSMENT & PLAN NOTE
Dx reviewed discussed healthy diet and exercise    Orders:    Follow Up In [facility] - PCP; Future    CBC and Auto Differential; Future    Comprehensive Metabolic Panel; Future    Hemoglobin A1C; Future    Lipid Panel; Future    Thyroid Stimulating Hormone; Future    T4, free; Future    Albumin-Creatinine Ratio, Urine Random; Future

## 2024-09-09 NOTE — ASSESSMENT & PLAN NOTE
Orders:    Follow Up In [facility] - PCP; Future    Follow Up In [facility] - PCP    CBC and Auto Differential; Future    Comprehensive Metabolic Panel; Future    Hemoglobin A1C; Future    Lipid Panel; Future    Thyroid Stimulating Hormone; Future    T4, free; Future    Albumin-Creatinine Ratio, Urine Random; Future

## 2024-09-09 NOTE — ASSESSMENT & PLAN NOTE
Orders:    Follow Up In [facility] - PCP; Future    Follow Up In [facility] - PCP; Future    Follow Up In [facility] - PCP    CBC and Auto Differential; Future    Comprehensive Metabolic Panel; Future    Hemoglobin A1C; Future    Lipid Panel; Future    Thyroid Stimulating Hormone; Future    T4, free; Future    Albumin-Creatinine Ratio, Urine Random; Future    dulaglutide (Trulicity) 4.5 mg/0.5 mL pen injector; Inject 4.5 mg under the skin 1 (one) time per week.

## 2024-09-09 NOTE — ASSESSMENT & PLAN NOTE
Orders:    Follow Up In [facility] - PCP; Future    Follow Up In [facility] - PCP    CBC and Auto Differential; Future    Comprehensive Metabolic Panel; Future    Hemoglobin A1C; Future    Lipid Panel; Future    Thyroid Stimulating Hormone; Future    T4, free; Future    Albumin-Creatinine Ratio, Urine Random; Future    albuterol (Ventolin HFA) 90 mcg/actuation inhaler; Inhale 2 puffs every 4 hours if needed for wheezing or shortness of breath. Dispense ventolin not proair    doxycycline (Vibramycin) 100 mg capsule; Take 1 capsule (100 mg) by mouth once a day on Monday, Wednesday, and Friday. Take with at least 8 ounces (large glass) of water, do not lie down for 30 minutes after

## 2024-09-11 ENCOUNTER — HOSPITAL ENCOUNTER (OUTPATIENT)
Dept: RADIOLOGY | Facility: CLINIC | Age: 59
Discharge: HOME | End: 2024-09-11
Payer: MEDICARE

## 2024-09-11 VITALS — BODY MASS INDEX: 35.48 KG/M2 | HEIGHT: 58 IN | WEIGHT: 169 LBS

## 2024-09-11 DIAGNOSIS — E11.9 TYPE 2 DIABETES MELLITUS WITHOUT COMPLICATION, WITHOUT LONG-TERM CURRENT USE OF INSULIN (MULTI): Primary | ICD-10-CM

## 2024-09-11 DIAGNOSIS — Z12.31 ENCOUNTER FOR SCREENING MAMMOGRAM FOR MALIGNANT NEOPLASM OF BREAST: ICD-10-CM

## 2024-09-11 PROCEDURE — 77067 SCR MAMMO BI INCL CAD: CPT | Performed by: RADIOLOGY

## 2024-09-11 PROCEDURE — 77067 SCR MAMMO BI INCL CAD: CPT

## 2024-09-11 PROCEDURE — 77063 BREAST TOMOSYNTHESIS BI: CPT | Performed by: RADIOLOGY

## 2024-09-12 DIAGNOSIS — E11.9 TYPE 2 DIABETES MELLITUS WITHOUT COMPLICATION, WITHOUT LONG-TERM CURRENT USE OF INSULIN (MULTI): Primary | ICD-10-CM

## 2024-09-13 ENCOUNTER — TELEPHONE (OUTPATIENT)
Dept: PRIMARY CARE | Facility: CLINIC | Age: 59
End: 2024-09-13
Payer: MEDICARE

## 2024-09-13 DIAGNOSIS — I10 HTN (HYPERTENSION), BENIGN: ICD-10-CM

## 2024-09-13 RX ORDER — METOPROLOL SUCCINATE 25 MG/1
25 TABLET, EXTENDED RELEASE ORAL DAILY
Qty: 90 TABLET | Refills: 3 | Status: SHIPPED | OUTPATIENT
Start: 2024-09-13

## 2024-09-13 NOTE — TELEPHONE ENCOUNTER
Received request for prescription refills for patient.   Patient follows with Dr. Faith    Request is for Metoprolol succinate   Is patient currently on medication yes    Last OV 2/2/24  Next OV 2/5/25    Pended for signing and sent to provider

## 2024-09-16 NOTE — TELEPHONE ENCOUNTER
----- Message from Raghav Garnett sent at 9/16/2024 11:38 AM EDT -----  Call Lauren Thompson their mammogram is normal

## 2024-09-26 ENCOUNTER — TELEPHONE (OUTPATIENT)
Dept: PRIMARY CARE | Facility: CLINIC | Age: 59
End: 2024-09-26
Payer: MEDICARE

## 2024-10-01 ENCOUNTER — APPOINTMENT (OUTPATIENT)
Dept: PHARMACY | Facility: HOSPITAL | Age: 59
End: 2024-10-01
Payer: MEDICARE

## 2024-10-01 DIAGNOSIS — E11.9 TYPE 2 DIABETES MELLITUS WITHOUT COMPLICATION, WITHOUT LONG-TERM CURRENT USE OF INSULIN (MULTI): ICD-10-CM

## 2024-10-02 RX ORDER — METFORMIN HYDROCHLORIDE 500 MG/1
500 TABLET, EXTENDED RELEASE ORAL
Qty: 90 TABLET | Refills: 3 | Status: SHIPPED | OUTPATIENT
Start: 2024-10-02

## 2024-10-02 NOTE — PROGRESS NOTES
Subjective     Patient ID: Lauren Thompson is a 59 y.o. female who presents for GLP-1 coverage.    HPI    No Known Allergies    Objective     Current DM Pharmacotherapy:   -metformin  mg daily  -Trulicity 4.5 mg weekly    SECONDARY PREVENTION  - Statin? Yes  - ACE-I/ARB? Yes  - Aspirin? Yes    Current monitoring regimen:   Patient is using: glucometer    Pertinent PMH Review:  - PMH of Pancreatitis: No  - PMH/FH of Medullary Thyroid Cancer: No  - PMH of Retinopathy: No  - PMH of Urinary Tract Infections: No    Lab Review  Lab Results   Component Value Date    BILITOT 0.3 09/09/2024    CALCIUM 9.8 09/09/2024    CO2 29 09/09/2024     09/09/2024    CREATININE 0.90 09/09/2024    GLUCOSE 84 09/09/2024    ALKPHOS 119 09/09/2024    K 4.9 09/09/2024    PROT 6.9 09/09/2024     09/09/2024    AST 16 09/09/2024    ALT 16 09/09/2024    BUN 22 09/09/2024    ANIONGAP 13 09/09/2024    MG 2.00 06/22/2022    PHOS 2.4 (L) 05/05/2021     02/14/2020    ALBUMIN 4.5 09/09/2024    AMYLASE 48 06/22/2022    LIPASE 41 06/22/2022    GFRF >90 07/11/2023     Lab Results   Component Value Date    TRIG 294 (H) 09/09/2024    CHOL 183 09/09/2024    LDLCALC 82 09/09/2024    HDL 42.1 09/09/2024     Lab Results   Component Value Date    HGBA1C 6.1 (H) 09/09/2024     The 10-year ASCVD risk score (Sondra DK, et al., 2019) is: 8.5%    Values used to calculate the score:      Age: 59 years      Sex: Female      Is Non- : No      Diabetic: Yes      Tobacco smoker: No      Systolic Blood Pressure: 116 mmHg      Is BP treated: Yes      HDL Cholesterol: 42.1 mg/dL      Total Cholesterol: 183 mg/dL      Assessment/Plan     Problem List Items Addressed This Visit       Type 2 diabetes mellitus without complication, without long-term current use of insulin (Multi)    Relevant Medications    metFORMIN  mg 24 hr tablet   Patient was referred for Trulicity coverage help. Ran test claim and Trulicity is covered  through insurance for $10/ month. Patient said this was affordable for her and she has already picked up from pharmacy. Told patient to call back with any issues with receiving medication or medication coverage. Will follow up as needed.    Type 2 diabetes mellitus, is at goal. <7%    Follow up: I recommend diabetes care be as need.    Silvia Mera, PharmD    Continue all meds under the continuation of care with the referring provider and clinical pharmacy team

## 2024-10-03 ENCOUNTER — OFFICE VISIT (OUTPATIENT)
Dept: PRIMARY CARE | Facility: CLINIC | Age: 59
End: 2024-10-03
Payer: MEDICARE

## 2024-10-03 VITALS
TEMPERATURE: 97.1 F | DIASTOLIC BLOOD PRESSURE: 74 MMHG | HEIGHT: 58 IN | SYSTOLIC BLOOD PRESSURE: 116 MMHG | OXYGEN SATURATION: 98 % | BODY MASS INDEX: 35.68 KG/M2 | HEART RATE: 85 BPM | WEIGHT: 170 LBS | RESPIRATION RATE: 21 BRPM

## 2024-10-03 DIAGNOSIS — J44.9 COPD, SEVERE (MULTI): ICD-10-CM

## 2024-10-03 DIAGNOSIS — I25.10 CORONARY ARTERY DISEASE INVOLVING NATIVE CORONARY ARTERY OF NATIVE HEART WITHOUT ANGINA PECTORIS: Primary | ICD-10-CM

## 2024-10-03 DIAGNOSIS — E78.5 HYPERLIPIDEMIA, UNSPECIFIED HYPERLIPIDEMIA TYPE: ICD-10-CM

## 2024-10-03 PROCEDURE — 99213 OFFICE O/P EST LOW 20 MIN: CPT | Performed by: FAMILY MEDICINE

## 2024-10-03 RX ORDER — ATORVASTATIN CALCIUM 80 MG/1
80 TABLET, FILM COATED ORAL DAILY
Qty: 90 TABLET | Refills: 3 | Status: SHIPPED | OUTPATIENT
Start: 2024-10-03 | End: 2025-10-03

## 2024-10-03 ASSESSMENT — PATIENT HEALTH QUESTIONNAIRE - PHQ9
1. LITTLE INTEREST OR PLEASURE IN DOING THINGS: NOT AT ALL
SUM OF ALL RESPONSES TO PHQ9 QUESTIONS 1 AND 2: 0
2. FEELING DOWN, DEPRESSED OR HOPELESS: NOT AT ALL

## 2024-10-03 NOTE — PROGRESS NOTES
"Subjective   Patient ID: Lauren Thompson is a 59 y.o. female who presents for Follow-up (Pt is here to follow up on COPD and discus order for at home oxygen. She would also would like to discus her last PFT. She states she has noticed SOB has increased and she is not able to walk long distances and is not able to do as much as she has in the past without resting. ).    HPI     Review of Systems   Constitutional:  Positive for fatigue. Negative for appetite change.   Eyes:  Negative for visual disturbance.   Respiratory:  Positive for cough, chest tightness, shortness of breath and stridor.    Cardiovascular:  Negative for chest pain and leg swelling.   Gastrointestinal:  Negative for diarrhea, nausea and vomiting.   Endocrine: Negative for polydipsia, polyphagia and polyuria.   Genitourinary:  Negative for frequency and urgency.   Musculoskeletal:  Negative for arthralgias and myalgias.   Neurological:  Negative for dizziness, syncope, weakness, numbness and headaches.       Objective   /74   Pulse 85   Temp 36.2 °C (97.1 °F)   Resp 21   Ht 1.473 m (4' 10\")   Wt 77.1 kg (170 lb)   SpO2 98%   BMI 35.53 kg/m²     Physical Exam  Constitutional:       Appearance: Normal appearance.   HENT:      Head: Normocephalic.   Eyes:      Conjunctiva/sclera: Conjunctivae normal.   Cardiovascular:      Rate and Rhythm: Normal rate and regular rhythm.      Heart sounds: Normal heart sounds.   Pulmonary:      Effort: Pulmonary effort is normal.      Breath sounds: Wheezing and rhonchi present.   Musculoskeletal:      Cervical back: Neck supple.   Skin:     General: Skin is warm and dry.   Neurological:      Mental Status: She is alert.         Assessment/Plan   Problem List Items Addressed This Visit             ICD-10-CM    CAD (coronary artery disease) - Primary I25.10    COPD, severe (Multi) J44.9    Hyperlipidemia E78.5    Relevant Medications    atorvastatin (Lipitor) 80 mg tablet     Pt to use continuous O2 at home " testing donein office . Pt had immediate desaturation to 80%

## 2024-10-04 ENCOUNTER — TRANSCRIBE ORDERS (OUTPATIENT)
Dept: CARDIAC REHAB | Facility: HOSPITAL | Age: 59
End: 2024-10-04
Payer: MEDICARE

## 2024-10-04 DIAGNOSIS — J44.9 CHRONIC OBSTRUCTIVE PULMONARY DISEASE, UNSPECIFIED COPD TYPE (MULTI): Primary | ICD-10-CM

## 2024-10-04 ASSESSMENT — ENCOUNTER SYMPTOMS
DIZZINESS: 0
ARTHRALGIAS: 0
DIARRHEA: 0
HEADACHES: 0
NUMBNESS: 0
STRIDOR: 1
VOMITING: 0
POLYDIPSIA: 0
COUGH: 1
NAUSEA: 0
FREQUENCY: 0
CHEST TIGHTNESS: 1
FATIGUE: 1
WEAKNESS: 0
MYALGIAS: 0
APPETITE CHANGE: 0
POLYPHAGIA: 0
SHORTNESS OF BREATH: 1

## 2024-10-14 DIAGNOSIS — E78.5 HYPERLIPIDEMIA, UNSPECIFIED: ICD-10-CM

## 2024-10-14 RX ORDER — ATORVASTATIN CALCIUM 40 MG/1
40 TABLET, FILM COATED ORAL DAILY
Qty: 90 TABLET | Refills: 3 | Status: SHIPPED | OUTPATIENT
Start: 2024-10-14

## 2024-10-14 NOTE — TELEPHONE ENCOUNTER
Received request for prescription refills for patient.   Patient follows with Dr. Jean Faith MD     Request is for refill  Is patient currently on medication yes    Last OV 2/2/24  Next OV 2/5/25    Pended for signing and sent to provider

## 2024-10-17 ENCOUNTER — APPOINTMENT (OUTPATIENT)
Dept: CARDIAC REHAB | Facility: HOSPITAL | Age: 59
End: 2024-10-17
Payer: MEDICARE

## 2024-10-30 ENCOUNTER — TELEPHONE (OUTPATIENT)
Dept: PRIMARY CARE | Facility: CLINIC | Age: 59
End: 2024-10-30
Payer: MEDICARE

## 2024-10-30 DIAGNOSIS — M19.90 ARTHRITIS: Primary | ICD-10-CM

## 2024-10-30 RX ORDER — CALCIUM CARBONATE 160(400)MG
1 TABLET,CHEWABLE ORAL DAILY
Qty: 1 EACH | Refills: 0 | Status: SHIPPED | OUTPATIENT
Start: 2024-10-30 | End: 2024-10-31 | Stop reason: SDUPTHER

## 2024-10-31 DIAGNOSIS — M19.90 ARTHRITIS: ICD-10-CM

## 2024-10-31 DIAGNOSIS — E11.9 TYPE 2 DIABETES MELLITUS WITHOUT COMPLICATION, WITHOUT LONG-TERM CURRENT USE OF INSULIN (MULTI): ICD-10-CM

## 2024-10-31 DIAGNOSIS — F41.9 ANXIETY DISORDER, UNSPECIFIED: ICD-10-CM

## 2024-10-31 DIAGNOSIS — E03.9 HYPOTHYROIDISM, UNSPECIFIED TYPE: ICD-10-CM

## 2024-10-31 RX ORDER — CALCIUM CARBONATE 160(400)MG
1 TABLET,CHEWABLE ORAL DAILY
Qty: 1 EACH | Refills: 3 | Status: SHIPPED | OUTPATIENT
Start: 2024-10-31

## 2024-11-04 RX ORDER — SERTRALINE HYDROCHLORIDE 100 MG/1
100 TABLET, FILM COATED ORAL DAILY
Qty: 90 TABLET | Refills: 3 | Status: SHIPPED | OUTPATIENT
Start: 2024-11-04

## 2024-11-04 RX ORDER — LEVOTHYROXINE SODIUM 88 UG/1
TABLET ORAL
Qty: 90 TABLET | Refills: 3 | Status: SHIPPED | OUTPATIENT
Start: 2024-11-04

## 2024-11-05 NOTE — TELEPHONE ENCOUNTER
Pt would like to know if her Mounjaro can be increased. Current dose is 2.5mg weekly. She has had 3 doses so far and is not having any issues.

## 2024-11-06 DIAGNOSIS — M19.90 ARTHRITIS: ICD-10-CM

## 2024-11-06 RX ORDER — DULAGLUTIDE 4.5 MG/.5ML
4.5 INJECTION, SOLUTION SUBCUTANEOUS
Start: 2024-11-10

## 2024-11-06 RX ORDER — CALCIUM CARBONATE 160(400)MG
1 TABLET,CHEWABLE ORAL DAILY
Qty: 1 EACH | Refills: 3 | Status: SHIPPED | OUTPATIENT
Start: 2024-11-06

## 2024-11-06 NOTE — TELEPHONE ENCOUNTER
Patient requests prescription below    Last Office Visit: 10/3/2024   Next Office Visit: 2024     Requested Prescriptions     Pending Prescriptions Disp Refills    walker (Ultra-Light Rollator) misc 1 each 3     Si Device once daily.

## 2024-11-21 ENCOUNTER — TELEPHONE (OUTPATIENT)
Dept: PRIMARY CARE | Facility: CLINIC | Age: 59
End: 2024-11-21
Payer: MEDICARE

## 2024-11-21 DIAGNOSIS — J06.9 UPPER RESPIRATORY TRACT INFECTION, UNSPECIFIED TYPE: Primary | ICD-10-CM

## 2024-11-21 RX ORDER — AZITHROMYCIN 250 MG/1
TABLET, FILM COATED ORAL
Qty: 6 TABLET | Refills: 0 | Status: SHIPPED | OUTPATIENT
Start: 2024-11-21 | End: 2024-11-26

## 2024-11-26 ASSESSMENT — ENCOUNTER SYMPTOMS
ABDOMINAL PAIN: 0
SORE THROAT: 0
SYNCOPE: 0
RHINORRHEA: 0
PND: 1
SPUTUM PRODUCTION: 1
LEG PAIN: 0
NECK PAIN: 0
VOMITING: 0
CLAUDICATION: 1
HEADACHES: 1
WHEEZING: 1
SWOLLEN GLANDS: 0
ORTHOPNEA: 1
HEMOPTYSIS: 0
SHORTNESS OF BREATH: 1
FEVER: 0

## 2024-12-02 ENCOUNTER — APPOINTMENT (OUTPATIENT)
Dept: PRIMARY CARE | Facility: CLINIC | Age: 59
End: 2024-12-02
Payer: MEDICARE

## 2024-12-02 VITALS
HEART RATE: 78 BPM | HEIGHT: 58 IN | BODY MASS INDEX: 36.73 KG/M2 | OXYGEN SATURATION: 96 % | WEIGHT: 175 LBS | SYSTOLIC BLOOD PRESSURE: 112 MMHG | RESPIRATION RATE: 19 BRPM | DIASTOLIC BLOOD PRESSURE: 58 MMHG | TEMPERATURE: 97.2 F

## 2024-12-02 DIAGNOSIS — J44.9 COPD, SEVERE (MULTI): ICD-10-CM

## 2024-12-02 DIAGNOSIS — E11.9 TYPE 2 DIABETES MELLITUS WITHOUT COMPLICATION, WITHOUT LONG-TERM CURRENT USE OF INSULIN (MULTI): Primary | ICD-10-CM

## 2024-12-02 DIAGNOSIS — J06.9 UPPER RESPIRATORY TRACT INFECTION, UNSPECIFIED TYPE: ICD-10-CM

## 2024-12-02 DIAGNOSIS — F41.9 ANXIETY: ICD-10-CM

## 2024-12-02 LAB — POC HEMOGLOBIN A1C: 6.1 % (ref 4.2–6.5)

## 2024-12-02 PROCEDURE — 83036 HEMOGLOBIN GLYCOSYLATED A1C: CPT | Performed by: FAMILY MEDICINE

## 2024-12-02 PROCEDURE — 99213 OFFICE O/P EST LOW 20 MIN: CPT | Performed by: FAMILY MEDICINE

## 2024-12-02 RX ORDER — METHYLPREDNISOLONE 4 MG/1
TABLET ORAL
Qty: 21 TABLET | Refills: 0 | Status: SHIPPED | OUTPATIENT
Start: 2024-12-02 | End: 2024-12-08

## 2024-12-02 RX ORDER — CETIRIZINE HYDROCHLORIDE 10 MG/1
10 TABLET ORAL NIGHTLY
COMMUNITY
Start: 2024-09-03

## 2024-12-02 RX ORDER — AZITHROMYCIN 250 MG/1
TABLET, FILM COATED ORAL
Qty: 6 TABLET | Refills: 0 | Status: SHIPPED | OUTPATIENT
Start: 2024-12-02 | End: 2024-12-07

## 2024-12-02 ASSESSMENT — ENCOUNTER SYMPTOMS
ORTHOPNEA: 1
WHEEZING: 1
CHEST TIGHTNESS: 0
CLAUDICATION: 1
RHINORRHEA: 0
ARTHRALGIAS: 0
SORE THROAT: 0
PND: 1
FEVER: 0
WEAKNESS: 0
FATIGUE: 0
LEG PAIN: 0
HEMOPTYSIS: 0
DIARRHEA: 0
MYALGIAS: 0
NUMBNESS: 0
VOMITING: 0
APPETITE CHANGE: 0
SWOLLEN GLANDS: 0
FREQUENCY: 0
SPUTUM PRODUCTION: 1
HEADACHES: 1
POLYDIPSIA: 0
SHORTNESS OF BREATH: 1
SYNCOPE: 0
NECK PAIN: 0
DIZZINESS: 0
POLYPHAGIA: 0
NAUSEA: 0
ABDOMINAL PAIN: 0

## 2024-12-02 NOTE — PROGRESS NOTES
"Subjective   Patient ID: Lauren Thompson is a 59 y.o. female who presents for Med Refill (3 month med check for DM and A1C. Pt states BS have been on average 115. She states she has been doing well and has no new concerns today. She would like to know what stage COPD she has.  ).    Shortness of Breath  Associated symptoms include claudication, headaches, orthopnea, PND, sputum production and wheezing. Pertinent negatives include no abdominal pain, chest pain, coryza, ear pain, fever, hemoptysis, leg pain, leg swelling, neck pain, rash, rhinorrhea, sore throat, swollen glands, syncope or vomiting.        Review of Systems   Constitutional:  Negative for appetite change, fatigue and fever.   HENT:  Negative for ear pain, rhinorrhea and sore throat.    Eyes:  Negative for visual disturbance.   Respiratory:  Positive for sputum production, shortness of breath and wheezing. Negative for hemoptysis and chest tightness.    Cardiovascular:  Positive for orthopnea, claudication and PND. Negative for chest pain, leg swelling and syncope.   Gastrointestinal:  Negative for abdominal pain, diarrhea, nausea and vomiting.   Endocrine: Negative for polydipsia, polyphagia and polyuria.   Genitourinary:  Negative for frequency and urgency.   Musculoskeletal:  Negative for arthralgias, myalgias and neck pain.   Skin:  Negative for rash.   Neurological:  Positive for headaches. Negative for dizziness, syncope, weakness and numbness.       Objective   /58   Pulse 78   Temp 36.2 °C (97.2 °F)   Resp 19   Ht 1.473 m (4' 10\")   Wt 79.4 kg (175 lb)   SpO2 96%   BMI 36.58 kg/m²     Physical Exam  Constitutional:       Appearance: Normal appearance.   HENT:      Head: Normocephalic.   Eyes:      Conjunctiva/sclera: Conjunctivae normal.   Cardiovascular:      Rate and Rhythm: Normal rate and regular rhythm.      Heart sounds: Normal heart sounds.   Pulmonary:      Breath sounds: Wheezing and rhonchi present.   Musculoskeletal:     "  Cervical back: Neck supple.   Skin:     General: Skin is warm and dry.   Neurological:      Mental Status: She is alert.         Assessment/Plan   Problem List Items Addressed This Visit             ICD-10-CM    Anxiety F41.9    COPD, severe (Multi) J44.9    Relevant Medications    methylPREDNISolone (Medrol Dospak) 4 mg tablets    Type 2 diabetes mellitus without complication, without long-term current use of insulin (Multi) - Primary E11.9    Relevant Orders    POCT glycosylated hemoglobin (Hb A1C) manually resulted (Completed)     Other Visit Diagnoses         Codes    Upper respiratory tract infection, unspecified type     J06.9    Relevant Medications    azithromycin (Zithromax) 250 mg tablet

## 2025-01-07 DIAGNOSIS — I25.10 CORONARY ARTERY DISEASE INVOLVING NATIVE CORONARY ARTERY OF NATIVE HEART WITHOUT ANGINA PECTORIS: ICD-10-CM

## 2025-01-07 RX ORDER — RANOLAZINE 500 MG/1
500 TABLET, EXTENDED RELEASE ORAL 2 TIMES DAILY
Qty: 180 TABLET | Refills: 3 | Status: SHIPPED | OUTPATIENT
Start: 2025-01-07 | End: 2026-01-07

## 2025-01-07 NOTE — TELEPHONE ENCOUNTER
Received request for prescription refill for patient.  Patient follows with Dr. Jean Faith MD     Request is for Ranexa  Is patient currently on medication- yes    Last OV- 2/2/24  Next OV- 2/5/25    Pended for signing and sent to provider.

## 2025-01-08 ENCOUNTER — TELEPHONE (OUTPATIENT)
Dept: CARDIOLOGY | Facility: CLINIC | Age: 60
End: 2025-01-08
Payer: MEDICARE

## 2025-01-08 NOTE — TELEPHONE ENCOUNTER
The member recently filled this medication and will be able to return for their next refill according to their plan limits.

## 2025-01-26 DIAGNOSIS — I10 HTN (HYPERTENSION), BENIGN: ICD-10-CM

## 2025-01-27 RX ORDER — LISINOPRIL AND HYDROCHLOROTHIAZIDE 20; 25 MG/1; MG/1
1 TABLET ORAL DAILY
Qty: 90 TABLET | Refills: 3 | Status: SHIPPED | OUTPATIENT
Start: 2025-01-27

## 2025-01-27 NOTE — TELEPHONE ENCOUNTER
Pharmacy requests prescription below    Last Office Visit: 12/2/2024   Next Office Visit: 1/28/2025     Requested Prescriptions     Pending Prescriptions Disp Refills    lisinopriL-hydrochlorothiazide 20-25 mg tablet [Pharmacy Med Name: LISINOPRIL-HCTZ 20-25 MG TAB] 90 tablet 3     Sig: TAKE 1 TABLET BY MOUTH EVERY DAY

## 2025-01-28 ENCOUNTER — OFFICE VISIT (OUTPATIENT)
Dept: PRIMARY CARE | Facility: CLINIC | Age: 60
End: 2025-01-28
Payer: MEDICARE

## 2025-01-28 VITALS
WEIGHT: 175 LBS | SYSTOLIC BLOOD PRESSURE: 124 MMHG | HEIGHT: 58 IN | DIASTOLIC BLOOD PRESSURE: 80 MMHG | HEART RATE: 72 BPM | BODY MASS INDEX: 36.73 KG/M2

## 2025-01-28 DIAGNOSIS — R10.84 GENERALIZED ABDOMINAL PAIN: ICD-10-CM

## 2025-01-28 DIAGNOSIS — E66.01 OBESITY, MORBID (MULTI): ICD-10-CM

## 2025-01-28 DIAGNOSIS — J96.22 ACUTE AND CHRONIC RESPIRATORY FAILURE WITH HYPERCAPNIA (MULTI): Primary | ICD-10-CM

## 2025-01-28 DIAGNOSIS — E11.9 TYPE 2 DIABETES MELLITUS WITHOUT COMPLICATION, WITHOUT LONG-TERM CURRENT USE OF INSULIN (MULTI): ICD-10-CM

## 2025-01-28 PROBLEM — J44.9 COPD, SEVERE (MULTI): Status: RESOLVED | Noted: 2023-07-13 | Resolved: 2025-01-28

## 2025-01-28 PROBLEM — F10.20 ALCOHOL ADDICTION (MULTI): Status: RESOLVED | Noted: 2023-07-13 | Resolved: 2025-01-28

## 2025-01-28 PROCEDURE — 99213 OFFICE O/P EST LOW 20 MIN: CPT | Performed by: FAMILY MEDICINE

## 2025-01-28 ASSESSMENT — ENCOUNTER SYMPTOMS
HEMATURIA: 0
HEADACHES: 0
DIARRHEA: 0
MYALGIAS: 0
WEIGHT LOSS: 0
FEVER: 0
BELCHING: 0
HEMATOCHEZIA: 0
ABDOMINAL PAIN: 1
FLATUS: 0
VOMITING: 0
ANOREXIA: 0
ARTHRALGIAS: 0
DYSURIA: 0
CONSTIPATION: 0
FREQUENCY: 0
NAUSEA: 1

## 2025-01-28 NOTE — PROGRESS NOTES
"Subjective   Patient ID: Lauren Thompson is a 59 y.o. female who presents for Abdominal Pain (Left side abdominal pain starting about 1 week ago/).    Abdominal Pain  This is a recurrent problem. The current episode started 1 to 4 weeks ago. The onset quality is undetermined. The problem occurs constantly. The problem has been waxing and waning. The pain is located in the LUQ. The pain is at a severity of 7/10. The quality of the pain is aching and a sensation of fullness. The abdominal pain radiates to the LLQ. Associated symptoms include nausea. Pertinent negatives include no anorexia, arthralgias, belching, constipation, diarrhea, dysuria, fever, flatus, frequency, headaches, hematochezia, hematuria, melena, myalgias, vomiting or weight loss. The pain is aggravated by certain positions, eating and palpation. The pain is relieved by Certain positions.        Review of Systems   Constitutional:  Negative for fever and weight loss.   Gastrointestinal:  Positive for abdominal pain and nausea. Negative for anorexia, constipation, diarrhea, flatus, hematochezia, melena and vomiting.   Genitourinary:  Negative for dysuria, frequency and hematuria.   Musculoskeletal:  Negative for arthralgias and myalgias.   Neurological:  Negative for headaches.       Objective   /80   Pulse 72   Ht 1.473 m (4' 10\")   Wt 79.4 kg (175 lb)   BMI 36.58 kg/m²     Physical Exam  Constitutional:       Appearance: Normal appearance.   HENT:      Head: Normocephalic.   Pulmonary:      Effort: Pulmonary effort is normal.   Abdominal:      Tenderness: There is abdominal tenderness in the left upper quadrant.       Musculoskeletal:      Cervical back: Neck supple.   Skin:     General: Skin is warm and dry.   Psychiatric:         Mood and Affect: Mood normal.         Assessment/Plan   Problem List Items Addressed This Visit             ICD-10-CM    Type 2 diabetes mellitus without complication, without long-term current use of insulin " (Multi) E11.9     Dx reviewed last A1C 6.1         Relevant Orders    CBC and Auto Differential    Comprehensive Metabolic Panel    Hemoglobin A1C    Thyroid Stimulating Hormone    T4, free    Amylase    Lipase    Sedimentation Rate    CT abdomen pelvis wo IV contrast    Obesity, morbid (Multi) E66.01     Dx reviewed discussed diet         Relevant Orders    CBC and Auto Differential    Comprehensive Metabolic Panel    Hemoglobin A1C    Thyroid Stimulating Hormone    T4, free    Amylase    Lipase    Sedimentation Rate    CT abdomen pelvis wo IV contrast    Acute and chronic respiratory failure with hypercapnia (Multi) - Primary J96.22     Dx reviewed on O2         Relevant Orders    CBC and Auto Differential    Comprehensive Metabolic Panel    Hemoglobin A1C    Thyroid Stimulating Hormone    T4, free    Amylase    Lipase    Sedimentation Rate    CT abdomen pelvis wo IV contrast     Other Visit Diagnoses         Codes    Generalized abdominal pain     R10.84

## 2025-01-29 LAB
ALBUMIN SERPL-MCNC: 4.5 G/DL (ref 3.6–5.1)
ALP SERPL-CCNC: 98 U/L (ref 37–153)
ALT SERPL-CCNC: 21 U/L (ref 6–29)
AMYLASE SERPL-CCNC: 49 U/L (ref 21–101)
ANION GAP SERPL CALCULATED.4IONS-SCNC: 11 MMOL/L (CALC) (ref 7–17)
AST SERPL-CCNC: 20 U/L (ref 10–35)
BASOPHILS # BLD AUTO: 52 CELLS/UL (ref 0–200)
BASOPHILS NFR BLD AUTO: 0.7 %
BILIRUB SERPL-MCNC: 0.3 MG/DL (ref 0.2–1.2)
BUN SERPL-MCNC: 20 MG/DL (ref 7–25)
CALCIUM SERPL-MCNC: 9.4 MG/DL (ref 8.6–10.4)
CHLORIDE SERPL-SCNC: 102 MMOL/L (ref 98–110)
CO2 SERPL-SCNC: 25 MMOL/L (ref 20–32)
CREAT SERPL-MCNC: 0.84 MG/DL (ref 0.5–1.05)
EGFRCR SERPLBLD CKD-EPI 2021: 79 ML/MIN/1.73M2
EOSINOPHIL # BLD AUTO: 104 CELLS/UL (ref 15–500)
EOSINOPHIL NFR BLD AUTO: 1.4 %
ERYTHROCYTE [DISTWIDTH] IN BLOOD BY AUTOMATED COUNT: 12.2 % (ref 11–15)
ERYTHROCYTE [SEDIMENTATION RATE] IN BLOOD BY WESTERGREN METHOD: 19 MM/H
EST. AVERAGE GLUCOSE BLD GHB EST-MCNC: 126 MG/DL
EST. AVERAGE GLUCOSE BLD GHB EST-SCNC: 7 MMOL/L
GLUCOSE SERPL-MCNC: 88 MG/DL (ref 65–99)
HBA1C MFR BLD: 6 % OF TOTAL HGB
HCT VFR BLD AUTO: 39.6 % (ref 35–45)
HGB BLD-MCNC: 13.1 G/DL (ref 11.7–15.5)
LIPASE SERPL-CCNC: 33 U/L (ref 7–60)
LYMPHOCYTES # BLD AUTO: 2738 CELLS/UL (ref 850–3900)
LYMPHOCYTES NFR BLD AUTO: 37 %
MCH RBC QN AUTO: 30.7 PG (ref 27–33)
MCHC RBC AUTO-ENTMCNC: 33.1 G/DL (ref 32–36)
MCV RBC AUTO: 92.7 FL (ref 80–100)
MONOCYTES # BLD AUTO: 533 CELLS/UL (ref 200–950)
MONOCYTES NFR BLD AUTO: 7.2 %
NEUTROPHILS # BLD AUTO: 3974 CELLS/UL (ref 1500–7800)
NEUTROPHILS NFR BLD AUTO: 53.7 %
PLATELET # BLD AUTO: 353 THOUSAND/UL (ref 140–400)
PMV BLD REES-ECKER: 9.4 FL (ref 7.5–12.5)
POTASSIUM SERPL-SCNC: 4.7 MMOL/L (ref 3.5–5.3)
PROT SERPL-MCNC: 6.8 G/DL (ref 6.1–8.1)
RBC # BLD AUTO: 4.27 MILLION/UL (ref 3.8–5.1)
SODIUM SERPL-SCNC: 138 MMOL/L (ref 135–146)
T4 FREE SERPL-MCNC: 1.2 NG/DL (ref 0.8–1.8)
TSH SERPL-ACNC: 3.56 MIU/L (ref 0.4–4.5)
WBC # BLD AUTO: 7.4 THOUSAND/UL (ref 3.8–10.8)

## 2025-01-30 ENCOUNTER — TELEPHONE (OUTPATIENT)
Dept: PRIMARY CARE | Facility: CLINIC | Age: 60
End: 2025-01-30
Payer: MEDICARE

## 2025-01-30 NOTE — TELEPHONE ENCOUNTER
----- Message from Raghav Garnett sent at 1/29/2025  7:18 AM EST -----  Callpt her A1C was 6 this is good  The rest of her labs were normal

## 2025-01-30 NOTE — TELEPHONE ENCOUNTER
-Message from Raghav Garnett sent at 1/29/2025  7:18 AM EST-    Callpt her A1C was 6 this is good  The rest of her labs were normal

## 2025-02-05 ENCOUNTER — APPOINTMENT (OUTPATIENT)
Dept: CARDIOLOGY | Facility: CLINIC | Age: 60
End: 2025-02-05
Payer: COMMERCIAL

## 2025-02-05 VITALS
HEART RATE: 88 BPM | HEIGHT: 58 IN | WEIGHT: 175 LBS | DIASTOLIC BLOOD PRESSURE: 74 MMHG | BODY MASS INDEX: 36.73 KG/M2 | SYSTOLIC BLOOD PRESSURE: 120 MMHG

## 2025-02-05 DIAGNOSIS — I25.10 CORONARY ARTERY DISEASE INVOLVING NATIVE CORONARY ARTERY OF NATIVE HEART WITHOUT ANGINA PECTORIS: ICD-10-CM

## 2025-02-05 DIAGNOSIS — R07.89 OTHER CHEST PAIN: ICD-10-CM

## 2025-02-05 DIAGNOSIS — Z99.81 OXYGEN DEPENDENT: ICD-10-CM

## 2025-02-05 DIAGNOSIS — E66.812 CLASS 2 OBESITY WITH BODY MASS INDEX (BMI) OF 35 TO 39.9 WITHOUT COMORBIDITY: ICD-10-CM

## 2025-02-05 DIAGNOSIS — I10 HTN (HYPERTENSION), BENIGN: ICD-10-CM

## 2025-02-05 DIAGNOSIS — E78.2 MIXED HYPERLIPIDEMIA: ICD-10-CM

## 2025-02-05 DIAGNOSIS — J40 BRONCHITIS: ICD-10-CM

## 2025-02-05 DIAGNOSIS — E78.00 PURE HYPERCHOLESTEROLEMIA: ICD-10-CM

## 2025-02-05 DIAGNOSIS — Z87.891 FORMER SMOKER: ICD-10-CM

## 2025-02-05 DIAGNOSIS — E11.9 TYPE 2 DIABETES MELLITUS WITHOUT COMPLICATION, WITHOUT LONG-TERM CURRENT USE OF INSULIN (MULTI): ICD-10-CM

## 2025-02-05 PROBLEM — E66.01 OBESITY, MORBID (MULTI): Status: RESOLVED | Noted: 2024-09-09 | Resolved: 2025-02-05

## 2025-02-05 PROCEDURE — 1036F TOBACCO NON-USER: CPT | Performed by: INTERNAL MEDICINE

## 2025-02-05 PROCEDURE — 3008F BODY MASS INDEX DOCD: CPT | Performed by: INTERNAL MEDICINE

## 2025-02-05 PROCEDURE — 99214 OFFICE O/P EST MOD 30 MIN: CPT | Performed by: INTERNAL MEDICINE

## 2025-02-05 PROCEDURE — 3078F DIAST BP <80 MM HG: CPT | Performed by: INTERNAL MEDICINE

## 2025-02-05 PROCEDURE — 3074F SYST BP LT 130 MM HG: CPT | Performed by: INTERNAL MEDICINE

## 2025-02-05 RX ORDER — AZITHROMYCIN 250 MG/1
TABLET, FILM COATED ORAL
COMMUNITY
Start: 2025-02-05

## 2025-02-05 ASSESSMENT — ENCOUNTER SYMPTOMS: SHORTNESS OF BREATH: 1

## 2025-02-05 NOTE — PROGRESS NOTES
CARDIOLOGY OFFICE VISIT      CHIEF COMPLAINT      HISTORY OF PRESENT ILLNESS  The patient states that recently she has been bothered by some left inframammary pain.  This is sharp and stabbing and/or dull and aching.  She states her last hours at a time when she gets it.  She states it is more noticeable when she walks but cannot have it at rest.  I told this sounds very typical for Myocard ischemia but because of her coronary disease I am going to obtain a Lexiscan nuclear stress test.  She is not treadmill walk on treadmill because of significant COPD.  She has her usual chronic dyspnea.  She denies pretibial edema.  She has some brief palpitations which are chronic and not prolonged.  She denies presyncope and syncope.  She denies any problem with her medication.  I did go over her recent lipid profiles with her.    Impression:  Coronary artery disease, complaining of non specific shortness of breath with chest discomfort while walking. Will obtain Nuclear Stress for further eval.   Hypertension  Hyperlipidemia  Former smoker  COPD, oxygen dependent   Diabetes Mellitus, Type II  Obesity     Please excuse any errors in grammar or translation related to this dictation. Voice recognition software was utilized to prepare this document.     Past Medical History  Past Medical History:   Diagnosis Date    Anxiety     Arthritis     COPD (chronic obstructive pulmonary disease) (Multi)     COPD (chronic obstructive pulmonary disease) with acute bronchitis (Multi) 2024    Coronary artery disease     Depression 2023    Diabetes mellitus (Multi)     Disease of thyroid gland     Headache     Heart disease     Hypertension        Social History  Social History     Tobacco Use    Smoking status: Former     Current packs/day: 0.00     Average packs/day: 1 pack/day for 41.5 years (41.5 ttl pk-yrs)     Types: Cigarettes     Start date: 1981     Quit date: 2022     Years since quittin.1    Smokeless tobacco:  Never   Substance Use Topics    Alcohol use: Not Currently     Comment: Quit drinking 6 months ago    Drug use: Never       Family History     Family History   Problem Relation Name Age of Onset    Other (Cardiac disorder) Mother Justynaregina Moonlemire     Other (CVA) Mother Justyna Stottlemire     Hypertension Mother Justyna Stottlemire     Other (Diabetes mellitus) Mother Justyna Stottlemire     Heart disease Mother Justyna Stottlemire     Kidney disease Mother Justyna Stottlemire     Stroke Mother Jutsyna Stottlemire     Alcohol abuse Mother Justyna Stottlemire     COPD Mother Justyna Stottlemire     Diabetes Mother Justyna Stottlemire     Cancer Father Oswaldo Knisley     Heart disease Father Oswaldo Knisley     Hypertension Father Oswaldo Knisley     Lung disease Father Oswaldo Knisley     Stroke Father Oswaldo Knisley     Arthritis Father Oswaldo Knisley     COPD Father Oswaldo Knisley     Diabetes Father Oswaldo Knisley     Cancer Sister Sunshine [place]     Depression Sister Sunshine [place]     Ovarian cancer Sister Sunshine Claudia     Depression Sister Jacquie Carl     Diabetes type II Sister Jacquie Carl     Hypertension Sister Jacquie Carl     Obesity Sister Jacquie Carl     Drug abuse Sister Jacquie Carl     Depression Sister Priya Gage     Diabetes type I Sister Lisa De Leon     Hypertension Sister Lisa De Leon     Thyroid disease Sister Lisa De Leon     Accidental death Sister Lisa De Leon     Diabetes Sister Lisa De Leon     Drug abuse Sister Lisa De Leon     Heart attack Sister Sweetie Saenz     Heart disease Sister Sweetie Saenz     Hypertension Sister Sweetie Saenz     Obesity Sister Sweetie Saenz     COPD Sister Sweetie Saenz     Diabetes Sister Sweetie Saenz     Hypertension Sister Miley Heuy     Stroke Sister Miley Heuy     Heart disease Maternal Grandmother Sweetie Trujilloor     Kidney disease Maternal Grandmother Sweetie Trujilloor     Diabetes Maternal Grandmother Sweetie Trujilloor     Cancer Paternal Grandmother Cheyanne Arreaga     Breast cancer Paternal Grandmother Cheyanne  Gibson         Allergies:  No Known Allergies     Outpatient Medications:  Current Outpatient Medications   Medication Instructions    albuterol (Ventolin HFA) 90 mcg/actuation inhaler 2 puffs, inhalation, Every 4 hours PRN, Dispense ventolin not proair    aspirin 81 mg, Daily    atorvastatin (LIPITOR) 80 mg, oral, Daily    azithromycin (Zithromax) 250 mg tablet     blood sugar diagnostic (Blood Glucose Test) strip 1 each, miscellaneous, Daily, Start checking sugars at least first thing in the morning before eating    blood-glucose meter misc Use daily or as directed for monitoring of diabetes.    ipratropium-albuteroL (Duo-Neb) 0.5-2.5 mg/3 mL nebulizer solution 3 mL, nebulization, 4 times daily RT    lancets misc 1 each, miscellaneous, Daily    levothyroxine (Synthroid, Levoxyl) 88 mcg tablet TAKE 1 TABLET EVERY MORNING BEFORE MEAL    lisinopriL-hydrochlorothiazide 20-25 mg tablet 1 tablet, oral, Daily    magnesium oxide (MAG-OX) 400 mg, oral, Daily    metFORMIN XR (GLUCOPHAGE-XR) 500 mg, oral, Daily with evening meal, Do not crush, chew, or split.    metoprolol succinate XL (TOPROL-XL) 25 mg, oral, Daily    Mucus Relief  mg 12 hr tablet TAKE 1 OR 2 TABLETS BY MOUTH TWICE DAILY AS NEEDED FOR CONGESTION.    oxygen (O2) gas therapy 2 L    ranolazine (RANEXA) 500 mg, oral, 2 times daily    sertraline (ZOLOFT) 100 mg, oral, Daily    Trelegy Ellipta 100-62.5-25 mcg blister with device INHALE 1 PUFF BY MOUTH DAILY. RINSE MOUTH AND SPIT AFTER USE.    Trulicity 4.5 mg, subcutaneous, Once Weekly    walker (Ultra-Light Rollator) misc 1 Device, miscellaneous, Daily          REVIEW OF SYSTEMS  Review of Systems   Constitutional:        Walking rollator for assistance    Cardiovascular:  Positive for chest pain.   Respiratory:  Positive for shortness of breath.    All other systems reviewed and are negative.        VITALS  Vitals:    02/05/25 0738   BP: 120/74   Pulse: 88       PHYSICAL EXAM  Vitals reviewed.    Constitutional:       Appearance: Normal and healthy appearance. Well-developed and not in distress.   Eyes:      Conjunctiva/sclera: Conjunctivae normal.      Pupils: Pupils are equal, round, and reactive to light.   Neck:      Vascular: No JVR. JVD normal.   Pulmonary:      Effort: Pulmonary effort is normal.      Breath sounds: Normal breath sounds. Decreased air movement present. No wheezing. No rhonchi. No rales.   Chest:      Chest wall: Not tender to palpatation.   Cardiovascular:      PMI at left midclavicular line. Normal rate. Regular rhythm. Normal S1. Normal S2.       Murmurs: There is no murmur.      No gallop.  No click. No rub.   Pulses:     Intact distal pulses.   Edema:     Peripheral edema absent.   Abdominal:      Tenderness: There is no abdominal tenderness.   Musculoskeletal: Normal range of motion.         General: No tenderness.      Cervical back: Normal range of motion. Skin:     General: Skin is warm and dry.   Neurological:      General: No focal deficit present.      Mental Status: Alert and oriented to person, place and time.   Psychiatric:         Behavior: Behavior is cooperative.           ASSESSMENT AND PLAN  Diagnoses and all orders for this visit:  Coronary artery disease involving native coronary artery of native heart without angina pectoris  HTN (hypertension), benign  Mixed hyperlipidemia  Type 2 diabetes mellitus without complication, without long-term current use of insulin (Multi)  Other chest pain  Pure hypercholesterolemia  Class 2 obesity with body mass index (BMI) of 35 to 39.9 without comorbidity  Bronchitis  Former smoker  Oxygen dependent      [unfilled]

## 2025-02-05 NOTE — PATIENT INSTRUCTIONS
Patient to follow up in 1 year with Dr. Jean Zhang MD      Office will arrange Nuclear Lexiscan stress test in near future.   Will call with results.     No other changes today.   Continue same medications and treatments.   Patient educated on proper medication use.   Patient educated on risk factor modification.   Please bring any lab results from other providers / physicians to your next appointment.     Please bring all medicines, vitamins, and herbal supplements with you when you come to the office.     Prescriptions will not be filled unless you are compliant with your follow up appointments or have a follow up appointment scheduled as per instruction of your physician. Refills should be requested at the time of your visit.    ISantosh RN am scribing for and in the presence of Dr. Jean Faith MD

## 2025-02-06 DIAGNOSIS — R11.2 NAUSEA AND VOMITING, UNSPECIFIED VOMITING TYPE: Primary | ICD-10-CM

## 2025-02-06 RX ORDER — ONDANSETRON 4 MG/1
4 TABLET, ORALLY DISINTEGRATING ORAL EVERY 8 HOURS PRN
Qty: 20 TABLET | Refills: 3 | Status: SHIPPED | OUTPATIENT
Start: 2025-02-06 | End: 2025-03-05

## 2025-02-06 NOTE — TELEPHONE ENCOUNTER
Requested Prescriptions     Pending Prescriptions Disp Refills    ondansetron ODT (Zofran-ODT) 4 mg disintegrating tablet 20 tablet 0     Sig: Dissolve 1 tablet (4 mg) in the mouth every 8 hours if needed for nausea or vomiting for up to 7 days.

## 2025-02-11 ENCOUNTER — HOSPITAL ENCOUNTER (OUTPATIENT)
Dept: RADIOLOGY | Facility: CLINIC | Age: 60
Discharge: HOME | End: 2025-02-11
Payer: MEDICARE

## 2025-02-11 DIAGNOSIS — E66.01 OBESITY, MORBID (MULTI): ICD-10-CM

## 2025-02-11 DIAGNOSIS — E11.9 TYPE 2 DIABETES MELLITUS WITHOUT COMPLICATION, WITHOUT LONG-TERM CURRENT USE OF INSULIN (MULTI): ICD-10-CM

## 2025-02-11 DIAGNOSIS — J96.22 ACUTE AND CHRONIC RESPIRATORY FAILURE WITH HYPERCAPNIA (MULTI): ICD-10-CM

## 2025-02-11 PROCEDURE — 74176 CT ABD & PELVIS W/O CONTRAST: CPT

## 2025-02-12 DIAGNOSIS — R10.84 GENERALIZED ABDOMINAL PAIN: Primary | ICD-10-CM

## 2025-02-25 ENCOUNTER — HOSPITAL ENCOUNTER (OUTPATIENT)
Dept: RADIOLOGY | Facility: HOSPITAL | Age: 60
Discharge: HOME | End: 2025-02-25
Payer: MEDICARE

## 2025-02-25 ENCOUNTER — TELEPHONE (OUTPATIENT)
Dept: CARDIOLOGY | Facility: CLINIC | Age: 60
End: 2025-02-25
Payer: MEDICARE

## 2025-02-25 ENCOUNTER — HOSPITAL ENCOUNTER (OUTPATIENT)
Dept: CARDIOLOGY | Facility: HOSPITAL | Age: 60
Discharge: HOME | End: 2025-02-25
Payer: MEDICARE

## 2025-02-25 DIAGNOSIS — R07.89 OTHER CHEST PAIN: ICD-10-CM

## 2025-02-25 DIAGNOSIS — I10 HTN (HYPERTENSION), BENIGN: ICD-10-CM

## 2025-02-25 DIAGNOSIS — I25.10 CORONARY ARTERY DISEASE INVOLVING NATIVE CORONARY ARTERY OF NATIVE HEART WITHOUT ANGINA PECTORIS: ICD-10-CM

## 2025-02-25 PROCEDURE — 2500000004 HC RX 250 GENERAL PHARMACY W/ HCPCS (ALT 636 FOR OP/ED): Performed by: INTERNAL MEDICINE

## 2025-02-25 PROCEDURE — 93017 CV STRESS TEST TRACING ONLY: CPT

## 2025-02-25 PROCEDURE — 3430000001 HC RX 343 DIAGNOSTIC RADIOPHARMACEUTICALS: Performed by: INTERNAL MEDICINE

## 2025-02-25 PROCEDURE — 78452 HT MUSCLE IMAGE SPECT MULT: CPT | Performed by: INTERNAL MEDICINE

## 2025-02-25 PROCEDURE — 78452 HT MUSCLE IMAGE SPECT MULT: CPT

## 2025-02-25 PROCEDURE — A9502 TC99M TETROFOSMIN: HCPCS | Performed by: INTERNAL MEDICINE

## 2025-02-25 RX ORDER — REGADENOSON 0.08 MG/ML
0.4 INJECTION, SOLUTION INTRAVENOUS ONCE
Status: COMPLETED | OUTPATIENT
Start: 2025-02-25 | End: 2025-02-25

## 2025-02-25 RX ADMIN — TETROFOSMIN 11.3 MILLICURIE: 0.23 INJECTION, POWDER, LYOPHILIZED, FOR SOLUTION INTRAVENOUS at 07:30

## 2025-02-25 RX ADMIN — REGADENOSON 0.4 MG: 0.08 INJECTION, SOLUTION INTRAVENOUS at 08:26

## 2025-02-25 RX ADMIN — TETROFOSMIN 35.7 MILLICURIE: 0.23 INJECTION, POWDER, LYOPHILIZED, FOR SOLUTION INTRAVENOUS at 08:26

## 2025-02-25 NOTE — TELEPHONE ENCOUNTER
----- Message from Nurse Sofiya BASURTO sent at 2/25/2025  2:52 PM EST -----    ----- Message -----  From: Jean Faith MD  Sent: 2/25/2025   2:43 PM EST  To: Sofiya Stahl LPN    Nuclear stress test normal  ----- Message -----  From: Interface, Radiology Results In  Sent: 2/25/2025   2:42 PM EST  To: Jean Faith MD

## 2025-03-12 ENCOUNTER — HOSPITAL ENCOUNTER (OUTPATIENT)
Dept: RADIOLOGY | Facility: HOSPITAL | Age: 60
Discharge: HOME | End: 2025-03-12
Payer: MEDICARE

## 2025-03-12 DIAGNOSIS — R91.1 SOLITARY PULMONARY NODULE: ICD-10-CM

## 2025-03-12 PROCEDURE — 71271 CT THORAX LUNG CANCER SCR C-: CPT | Performed by: RADIOLOGY

## 2025-03-12 PROCEDURE — 71271 CT THORAX LUNG CANCER SCR C-: CPT

## 2025-03-14 ENCOUNTER — TELEPHONE (OUTPATIENT)
Dept: CARDIOLOGY | Facility: CLINIC | Age: 60
End: 2025-03-14
Payer: MEDICARE

## 2025-03-14 NOTE — TELEPHONE ENCOUNTER
Patient called and LM stating she had a lung CT done and she stated it shows there is blockage in her aorta. She would like Dr. Merissa Virgen MD, [facility] to review this. Routed to .Santosh MCGOVERN RN

## 2025-03-19 ENCOUNTER — TELEPHONE (OUTPATIENT)
Dept: PRIMARY CARE | Facility: CLINIC | Age: 60
End: 2025-03-19
Payer: MEDICARE

## 2025-03-19 DIAGNOSIS — R10.84 GENERALIZED ABDOMINAL PAIN: Primary | ICD-10-CM

## 2025-03-19 NOTE — TELEPHONE ENCOUNTER
Pt wants to know if you would look at her lung scan that she had done. She has questions about the blockage that she had in her abdominal aorta. She wanted to know if this could be part of her stomach problems. She hasn't set up a GI apt yet and wanted to know if that would help.

## 2025-03-20 NOTE — TELEPHONE ENCOUNTER
Chris fracne superior mesentaric artery ultrasound call radiology and ask them if that get the celiac artery too.  Please schedule for pt

## 2025-03-28 ENCOUNTER — TELEPHONE (OUTPATIENT)
Dept: PRIMARY CARE | Facility: CLINIC | Age: 60
End: 2025-03-28

## 2025-03-28 ENCOUNTER — HOSPITAL ENCOUNTER (OUTPATIENT)
Dept: CARDIOLOGY | Facility: HOSPITAL | Age: 60
Discharge: HOME | End: 2025-03-28
Payer: MEDICARE

## 2025-03-28 DIAGNOSIS — K55.9 ACUTE VASCULAR DISORDER OF INTESTINE (MULTI): ICD-10-CM

## 2025-03-28 DIAGNOSIS — K55.1 SUPERIOR MESENTERIC ARTERY STENOSIS (MULTI): Primary | ICD-10-CM

## 2025-03-28 DIAGNOSIS — R10.84 GENERALIZED ABDOMINAL PAIN: ICD-10-CM

## 2025-03-28 PROCEDURE — 93975 VASCULAR STUDY: CPT

## 2025-03-28 PROCEDURE — 93975 VASCULAR STUDY: CPT | Performed by: INTERNAL MEDICINE

## 2025-03-28 NOTE — TELEPHONE ENCOUNTER
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 I am going to refer her to a vascular specialist to get their op[inion of this

## 2025-03-28 NOTE — RESULT ENCOUNTER NOTE
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 I am going to refer her to a vascular specialist to get their op[inion of this

## 2025-04-06 DIAGNOSIS — I25.10 CORONARY ARTERY DISEASE INVOLVING NATIVE CORONARY ARTERY OF NATIVE HEART WITHOUT ANGINA PECTORIS: ICD-10-CM

## 2025-04-06 DIAGNOSIS — I10 HTN (HYPERTENSION), BENIGN: ICD-10-CM

## 2025-04-07 RX ORDER — LANOLIN ALCOHOL/MO/W.PET/CERES
1 CREAM (GRAM) TOPICAL DAILY
Qty: 90 TABLET | Refills: 3 | Status: SHIPPED | OUTPATIENT
Start: 2025-04-07 | End: 2026-04-07

## 2025-04-07 NOTE — TELEPHONE ENCOUNTER
Received request for prescription refills for patient.   Patient follows with Dr. Faith    Request is for Mag-ox  Is patient currently on medication unsure?  Last OV in 2/2025 -MAG_ox is in med list, however in is not listed under med management in pt chart today, called and left message requesting pt to return call regarding a medication    Last OV 2/5/2025  Next OV 2/11/2026    AWAITING PT TO RETURN CALL

## 2025-04-07 NOTE — TELEPHONE ENCOUNTER
Patient returned call and stated that she is taking the magnesium oxide 400 mg once daily. Routed to provider for signing.

## 2025-04-16 ENCOUNTER — APPOINTMENT (OUTPATIENT)
Dept: VASCULAR SURGERY | Facility: CLINIC | Age: 60
End: 2025-04-16
Payer: MEDICARE

## 2025-04-16 VITALS
HEIGHT: 58 IN | DIASTOLIC BLOOD PRESSURE: 68 MMHG | HEART RATE: 78 BPM | SYSTOLIC BLOOD PRESSURE: 112 MMHG | WEIGHT: 171 LBS | BODY MASS INDEX: 35.89 KG/M2 | OXYGEN SATURATION: 98 %

## 2025-04-16 DIAGNOSIS — K55.1 SUPERIOR MESENTERIC ARTERY STENOSIS (MULTI): ICD-10-CM

## 2025-04-16 PROCEDURE — 3078F DIAST BP <80 MM HG: CPT | Performed by: SURGERY

## 2025-04-16 PROCEDURE — 3074F SYST BP LT 130 MM HG: CPT | Performed by: SURGERY

## 2025-04-16 PROCEDURE — 3008F BODY MASS INDEX DOCD: CPT | Performed by: SURGERY

## 2025-04-16 PROCEDURE — 99215 OFFICE O/P EST HI 40 MIN: CPT | Performed by: SURGERY

## 2025-04-16 RX ORDER — PREDNISONE 10 MG/1
TABLET ORAL
COMMUNITY
Start: 2025-03-19

## 2025-04-16 RX ORDER — ONDANSETRON 4 MG/1
4 TABLET, ORALLY DISINTEGRATING ORAL
COMMUNITY
Start: 2025-04-07

## 2025-04-16 RX ORDER — PROMETHAZINE HYDROCHLORIDE AND DEXTROMETHORPHAN HYDROBROMIDE 6.25; 15 MG/5ML; MG/5ML
5 SYRUP ORAL EVERY 6 HOURS
COMMUNITY
Start: 2025-03-19

## 2025-04-16 RX ORDER — AMOXICILLIN 500 MG/1
500 CAPSULE ORAL 3 TIMES DAILY
COMMUNITY
Start: 2025-03-19

## 2025-04-16 NOTE — PROGRESS NOTES
History Of Present Illness  Lauren Thompson is a 60 y.o. female presenting for evaluation of mesenteric ischemia. She reports abdominal pain after eating for the past 4 months. She describes it as sometimes crampy an sometimes a dull ache. She also reports associated nausea, vomiting, and bloating. She denies any change in appetite or weight loss. Medical history includes HTN, T2DM, CAD, COPD, and hypothyroidism. Past surgical history includes appendectomy. She is a former smoker of 40+ years, but quit 2 years ago. She was sent for mesenteric duplex by her PCP, which suggest high grade SMA stenosis, but celiac and FAHAD patent with no stenosis.     Past Medical History  She has a past medical history of Anxiety, Arthritis, COPD (chronic obstructive pulmonary disease) (Multi), COPD (chronic obstructive pulmonary disease) with acute bronchitis (Multi) (03/03/2024), Coronary artery disease, Depression (9/25/2023), Diabetes mellitus (Multi), Disease of thyroid gland, Headache, Heart disease, and Hypertension.    Surgical History  She has a past surgical history that includes Other surgical history (07/08/2019); Other surgical history (07/08/2019); Other surgical history (07/08/2019); Other surgical history (07/08/2019); Partial hysterectomy (09/25/2023); Bladder surgery (09/25/2023); Cardiac catheterization; Appendectomy; Hysterectomy; Sinus surgery; Tonsillectomy; and [place] tooth extraction.     Social History  She reports that she quit smoking about 2 years ago. Her smoking use included cigarettes. She started smoking about 43 years ago. She has a 41.5 pack-year smoking history. She has never used smokeless tobacco. She reports that she does not currently use alcohol. She reports that she does not use drugs.    Family History  Family History[1]     Allergies  Patient has no known allergies.    Review of Systems   Constitutional: Negative.  Negative for appetite change and unexpected weight change.   HENT: Negative.      Eyes: Negative.    Respiratory:  Negative for cough and shortness of breath.    Cardiovascular:  Negative for chest pain and leg swelling.   Gastrointestinal:  Positive for abdominal pain, nausea and vomiting.   Endocrine: Negative.    Genitourinary: Negative.    Musculoskeletal:  Negative for back pain and gait problem.   Skin:  Negative for color change, pallor and wound.   Neurological:  Negative for dizziness, syncope, speech difficulty, weakness, numbness and headaches.   Hematological:  Does not bruise/bleed easily.   Psychiatric/Behavioral: Negative.          Physical Exam  Vitals reviewed.   Constitutional:       General: She is not in acute distress.     Appearance: Normal appearance. She is morbidly obese.   HENT:      Head: Normocephalic and atraumatic.   Eyes:      Extraocular Movements: Extraocular movements intact.      Conjunctiva/sclera: Conjunctivae normal.   Neck:      Vascular: No carotid bruit.   Cardiovascular:      Rate and Rhythm: Normal rate and regular rhythm.      Pulses: Normal pulses.           Radial pulses are 2+ on the right side and 2+ on the left side.        Femoral pulses are 2+ on the right side and 2+ on the left side.     Heart sounds: Normal heart sounds.   Pulmonary:      Effort: Pulmonary effort is normal.      Breath sounds: Normal breath sounds.   Abdominal:      Palpations: Abdomen is soft.      Tenderness: There is abdominal tenderness in the epigastric area.   Musculoskeletal:         General: No swelling. Normal range of motion.      Cervical back: Normal range of motion. No tenderness.   Skin:     General: Skin is warm and dry.   Neurological:      General: No focal deficit present.      Mental Status: She is alert and oriented to person, place, and time.      Cranial Nerves: No cranial nerve deficit.      Sensory: No sensory deficit.      Motor: No weakness.   Psychiatric:         Mood and Affect: Mood normal.         Behavior: Behavior normal.          Last  "Recorded Vitals  Blood pressure 112/68, pulse 78, height 1.473 m (4' 10\"), weight 77.6 kg (171 lb), SpO2 98%.    Relevant Results    Current Medications[2]       Vascular US mesenteric artery duplex complete  Result Date: 3/28/2025            [facility] [address] [facility] [place] [address]     Tel [phone] Fax [phone]  Vascular Lab Report  VASC US MESENTERIC ARTERY DUPLEX COMPLETE Patient Name:      REGGIE GALLEGO     Reading Physician: [id number] Lavonne Quijano MD Study Date:        3/28/2025           Ordering Provider: [id number] ROBBIE MIXON MRN/PID:           [id number]            Fellow: Accession#:        [id number]        Technologist:      Amy Alicea RVGARO Date of Birth/Age: 1965 / 59 years Technologist 2: Gender:            F                   Encounter#:        [id number] Admission Status:  Outpatient          Location           [facility]                                        Performed:  Diagnosis/ICD: Acute mesenteric ischemia-K55.0 Indication:    Mesenteric ischemia chronic CPT Codes:     76266 Mesenteric Duplex scan  Pertinent History: Post prandial pain.  CONCLUSIONS: Mesenteric: The celiac artery demonstrates no evidence of hemodynamically significant stenosis. The superior mesenteric artery demonstrates a greater than 70% stenosis. Collateral branches noted at origin and proximal. There are elevated velocities in the hepatic artery suggesting stenosis. There are elevated velocities in the splenic artery suggesting stenosis.  Imaging & Doppler Findings:  Aorta PSV         64 cm/s Celiac Origin  cm/s Celiac Prox PSV   122 cm/s Celiac Mid PSV    130 cm/s Celiac Dist PSV   144 cm/s SMA Origin PSV    282 cm/s SMA Prox PSV      415 cm/s SMA Mid PSV       280 cm/s SMA Dist PSV      133 cm/s FAHAD Prox PSV      135 cm/s Hepatic PSV       299 cm/s Splenic PSV       242 cm/s   [id number] Lavonne Quijano MD Electronically " signed by [id number] Lavonne Quijano MD on 3/28/2025 at 11:02:44 AM  ** Final **           Assessment/Plan   Diagnoses and all orders for this visit:  Superior mesenteric artery stenosis (Multi)  -     Referral to Vascular Surgery      61yo female with 4-month h/o abdominal pain after eating. She denies any weight loss or appetite change during this time. She does have some reproducible epigastric tenderness on exam. Mesenteric duplex does suggest SMA stenosis. However celiac and SMA are patent and without stenosis. Given these findings, I think mesenteric ischemia is unlikely the source of her symptoms. I have recommended thorough work up by gastroenterology to evaluate for other sources. Will have her follow up in the vascular office in 3 months.         Negra Cueto MD        [1]   Family History  Problem Relation Name Age of Onset    Other (Cardiac disorder) Mother Justyna Stottlemire     Other (CVA) Mother Justyna Stottlemire     Hypertension Mother Justyna Stottlemire     Other (Diabetes mellitus) Mother Justyna Stottlemire     Heart disease Mother Justyna Stottlemire     Kidney disease Mother Justyna Stottlemire     Stroke Mother Justyna Stottlemire     Alcohol abuse Mother Justyna Stottlemire     COPD Mother Justyna Stottlemire     Diabetes Mother Justyna Stottlemire     Cancer Father Oswaldo Knisley     Heart disease Father Oswaldo Knisley     Hypertension Father Oswaldo Knisley     Lung disease Father Oswaldo Knisley     Stroke Father Oswaldo Knisley     Arthritis Father Oswaldo Knisley     COPD Father Oswaldo Knisley     Diabetes Father Oswaldo Knisley     Cancer Sister Sunshine C[place]ron     Depression Sister Sunshine Claudia     Ovarian cancer Sister Sunshine Claudia     Depression Sister Jacquie Carl     Diabetes type II Sister Jacquie Carl     Hypertension Sister Jacquie Carl     Obesity Sister Jacquie Carl     Drug abuse Sister Jacquie Carl     Depression Sister Priya [place]     Diabetes type I Sister Lisa De Leon     Hypertension Sister Lisa De Leon      Thyroid disease Sister Lisa De Leon     Accidental death Sister Lisa De Leon     Diabetes Sister Lisa De Leon     Drug abuse Sister Lisa De Leon     Heart attack Sister Sweetie Saenz     Heart disease Sister Sweetie Saenz     Hypertension Sister Sweetie Saenz     Obesity Sister Sweetie Saenz     COPD Sister Sweetie Saenz     Diabetes Sister Sweetie Saenz     Hypertension Sister Miley Heuy     Stroke Sister Miley Heuy     Heart disease Maternal Grandmother Sweetie Obed     Kidney disease Maternal Grandmother Sweetie Obed     Diabetes Maternal Grandmother Sweetie Obed     Cancer Paternal Grandmother Cheyanne Arreaga     Breast cancer Paternal Grandmother Cheyanne Arreaga    [2]   Current Outpatient Medications:     albuterol (Ventolin HFA) 90 mcg/actuation inhaler, Inhale 2 puffs every 4 hours if needed for wheezing or shortness of breath. Dispense ventolin not proair, Disp: 18 g, Rfl: 5    amoxicillin (Amoxil) 500 mg capsule, Take 1 capsule (500 mg) by mouth 3 times a day., Disp: , Rfl:     aspirin 81 mg EC tablet, Take 1 tablet (81 mg) by mouth once daily., Disp: , Rfl:     atorvastatin (Lipitor) 80 mg tablet, Take 1 tablet (80 mg) by mouth once daily., Disp: 90 tablet, Rfl: 3    azithromycin (Zithromax) 250 mg tablet, As needed, Disp: , Rfl:     blood sugar diagnostic (Blood Glucose Test) strip, 1 each once daily. Start checking sugars at least first thing in the morning before eating, Disp: 100 each, Rfl: 2    dulaglutide (Trulicity) 4.5 mg/0.5 mL pen injector, Inject 4.5 mg under the skin 1 (one) time per week., Disp: , Rfl:     ipratropium-albuteroL (Duo-Neb) 0.5-2.5 mg/3 mL nebulizer solution, Take 3 mL by nebulization 4 times a day., Disp: 360 mL, Rfl: 11    lancets misc, 1 each once daily., Disp: 100 each, Rfl: 2    levothyroxine (Synthroid, Levoxyl) 88 mcg tablet, TAKE 1 TABLET EVERY MORNING BEFORE MEAL, Disp: 90 tablet, Rfl: 3    lisinopriL-hydrochlorothiazide 20-25 mg tablet, TAKE 1 TABLET BY MOUTH EVERY DAY, Disp: 90  tablet, Rfl: 3    magnesium oxide (Mag-Ox) 400 mg (241.3 mg magnesium) tablet, Take 1 tablet (400 mg) by mouth once daily., Disp: 90 tablet, Rfl: 3    metFORMIN  mg 24 hr tablet, Take 1 tablet (500 mg) by mouth once daily in the evening. Take with meals. Do not crush, chew, or split., Disp: 90 tablet, Rfl: 3    metoprolol succinate XL (Toprol-XL) 25 mg 24 hr tablet, TAKE 1 TABLET BY MOUTH EVERY DAY, Disp: 90 tablet, Rfl: 3    Mucus Relief  mg 12 hr tablet, TAKE 1 OR 2 TABLETS BY MOUTH TWICE DAILY AS NEEDED FOR CONGESTION., Disp: 120 tablet, Rfl: 3    ondansetron ODT (Zofran-ODT) 4 mg disintegrating tablet, Dissolve 1 tablet (4 mg) in the mouth., Disp: , Rfl:     oxygen (O2) gas therapy, Inhale 2 each. As needed, Disp: , Rfl:     predniSONE (Deltasone) 10 mg tablet, TAKE 6 TABS DAILY 3 DAYS,5 DAILY 3 DAYS,4 DAILY 3 DAYS,3 DAILY 3 DAYS, 2 DAILY 3 DAYS,1 DAILY 3 DAYS, Disp: , Rfl:     promethazine-DM (Phenergan-DM) 6.25-15 mg/5 mL syrup, Take 5 mL by mouth every 6 hours., Disp: , Rfl:     ranolazine (Ranexa) 500 mg 12 hr tablet, Take 1 tablet (500 mg) by mouth 2 times a day. (Patient taking differently: Take 1 tablet (500 mg) by mouth once daily.), Disp: 180 tablet, Rfl: 3    sertraline (Zoloft) 100 mg tablet, TAKE 1 TABLET BY MOUTH EVERY DAY, Disp: 90 tablet, Rfl: 3    Trelegy Ellipta 100-62.5-25 mcg blister with device, INHALE 1 PUFF BY MOUTH DAILY. RINSE MOUTH AND SPIT AFTER USE., Disp: , Rfl:     walker (Ultra-Light Rollator) misc, 1 Device once daily., Disp: 1 each, Rfl: 3

## 2025-04-18 PROBLEM — K55.1 SUPERIOR MESENTERIC ARTERY STENOSIS (MULTI): Status: ACTIVE | Noted: 2025-04-18

## 2025-04-18 ASSESSMENT — ENCOUNTER SYMPTOMS
VOMITING: 1
NAUSEA: 1
COUGH: 0
WEAKNESS: 0
BRUISES/BLEEDS EASILY: 0
ABDOMINAL PAIN: 1
UNEXPECTED WEIGHT CHANGE: 0
NUMBNESS: 0
EYES NEGATIVE: 1
SHORTNESS OF BREATH: 0
CONSTITUTIONAL NEGATIVE: 1
DIZZINESS: 0
WOUND: 0
HEADACHES: 0
PSYCHIATRIC NEGATIVE: 1
APPETITE CHANGE: 0
ENDOCRINE NEGATIVE: 1
COLOR CHANGE: 0
SPEECH DIFFICULTY: 0
BACK PAIN: 0

## 2025-04-23 ENCOUNTER — OFFICE VISIT (OUTPATIENT)
Dept: GASTROENTEROLOGY | Facility: HOSPITAL | Age: 60
End: 2025-04-23
Payer: MEDICARE

## 2025-04-23 VITALS
SYSTOLIC BLOOD PRESSURE: 106 MMHG | WEIGHT: 170 LBS | BODY MASS INDEX: 35.53 KG/M2 | OXYGEN SATURATION: 97 % | HEART RATE: 95 BPM | RESPIRATION RATE: 20 BRPM | TEMPERATURE: 97.5 F | DIASTOLIC BLOOD PRESSURE: 46 MMHG

## 2025-04-23 DIAGNOSIS — R10.84 GENERALIZED ABDOMINAL PAIN: ICD-10-CM

## 2025-04-23 DIAGNOSIS — R10.12 LUQ ABDOMINAL PAIN: Primary | ICD-10-CM

## 2025-04-23 PROCEDURE — 99214 OFFICE O/P EST MOD 30 MIN: CPT | Performed by: PHYSICIAN ASSISTANT

## 2025-04-23 PROCEDURE — 3074F SYST BP LT 130 MM HG: CPT | Performed by: PHYSICIAN ASSISTANT

## 2025-04-23 PROCEDURE — 99204 OFFICE O/P NEW MOD 45 MIN: CPT | Performed by: PHYSICIAN ASSISTANT

## 2025-04-23 PROCEDURE — 3078F DIAST BP <80 MM HG: CPT | Performed by: PHYSICIAN ASSISTANT

## 2025-04-23 ASSESSMENT — PAIN SCALES - GENERAL: PAINLEVEL_OUTOF10: 6

## 2025-04-23 NOTE — PATIENT INSTRUCTIONS
Please call [phone] to schedule your EGD ( upper scope).    Follow up with me 2 wks after endoscopy completed

## 2025-04-23 NOTE — PROGRESS NOTES
"History Of Present Illness  Lauren Thompson is a 60 y.o. female with h/o HTN, CAD, DM II, COPD (2L O2 NC, wearing at night and when active during the day) and hypothyroidism is here for generalized abd pain.    Pt having LUQ abdominal pain since Feb 2025.  Pt describes it as dull ache, and sometimes can be so \"sharp\" it makes her nauseous (occurs 2x/week).   This pain can be random. It usually occurs after eating, but can also occur without eating anything.  Pt has a BM daily, but it can vary btwn formed, loose to watery.  Pt denies melena or hematochezia. Pt denies any unintentional weight loss.     Pt saw vascular recently ( 4/16/25) secondary to a mesenteric vascular ultrasound revealing changes suggestive of SMA stenosis. However, her celiac and FAHAD were patent with no stenosis.  They wanted us, GI to work up other potential causes of her stomach pain.    No EGD seen in EMR.  Last screening colonoscopy was on Jan 2023. BBPS score - 9.  1 (9mm) polyp removed in sigmoid. 5 (4-6mm) polyps removed in sigmoid, non bleeding internal hemorrhoids. Bx - all were adenomatous.  Repeat surveillance in 3 yrs (Jan 2026).     Recent imaging:  CT abd/p (without contrast)  in Jan 2025 - No acute abnormalities. Mild colonic diverticulosis.   Pt's CBC, lipase and CMP (from Jan 2025) was normal.        Past Medical History  She has a past medical history of Anxiety, Arthritis, COPD (chronic obstructive pulmonary disease) (Multi), COPD (chronic obstructive pulmonary disease) with acute bronchitis (Multi) (03/03/2024), Coronary artery disease, Depression (9/25/2023), Diabetes mellitus (Multi), Disease of thyroid gland, Headache, Heart disease, and Hypertension.    Surgical History  She has a past surgical history that includes Other surgical history (07/08/2019); Other surgical history (07/08/2019); Other surgical history (07/08/2019); Other surgical history (07/08/2019); Partial hysterectomy (09/25/2023); Bladder surgery " (09/25/2023); Cardiac catheterization; Appendectomy; Hysterectomy; Sinus surgery; Tonsillectomy; and [place] tooth extraction.     Social History  She reports that she quit smoking about 2 years ago. Her smoking use included cigarettes. She started smoking about 43 years ago. She has a 41.5 pack-year smoking history. She has never used smokeless tobacco. She reports that she does not currently use alcohol. She reports that she does not use drugs.    Family History  No GI cancers  Allergies  Patient has no known allergies.      Review of Systems   Constitutional:  Negative for appetite change and unexpected weight change.   HENT:  Negative for sore throat and trouble swallowing.    Eyes:  Negative for redness.   Respiratory:  Negative for cough, choking and chest tightness.    Cardiovascular:  Negative for chest pain.   Gastrointestinal:  Positive for abdominal pain (LUQ and epigastric area). Negative for blood in stool, constipation, diarrhea, nausea and vomiting.   Genitourinary:  Negative for dysuria and hematuria.   Musculoskeletal:  Negative for joint swelling.   Skin:  Negative for rash.   Neurological:  Negative for weakness.   Hematological:  Does not bruise/bleed easily.   Psychiatric/Behavioral:  Negative for agitation, confusion and dysphoric mood.           BP (!) 106/46 (BP Location: Right arm, Patient Position: Sitting, BP Cuff Size: Large adult)   Pulse 95   Temp 36.4 °C (97.5 °F) (Temporal)   Resp 20   Wt 77.1 kg (170 lb)   SpO2 97%   BMI 35.53 kg/m²   Physical Exam  Vitals reviewed.   Constitutional:       General: She is not in acute distress.     Appearance: Normal appearance. She is normal weight. She is not ill-appearing.   HENT:      Head: Normocephalic and atraumatic.      Mouth/Throat:      Mouth: Mucous membranes are moist.      Pharynx: Oropharynx is clear. No oropharyngeal exudate or posterior oropharyngeal erythema.   Eyes:      General: No scleral icterus.     Pupils: Pupils are  equal, round, and reactive to light.   Cardiovascular:      Rate and Rhythm: Normal rate and regular rhythm.      Heart sounds: Normal heart sounds.   Pulmonary:      Effort: Pulmonary effort is normal. No respiratory distress.      Breath sounds: Wheezing (expiratory wheezing bilaterally) present.      Comments: Decreased breath sounds throughout  Abdominal:      General: Bowel sounds are normal. There is no distension.      Palpations: Abdomen is soft.      Tenderness: There is abdominal tenderness (in LUQ and epigastric region). There is no guarding or rebound.   Musculoskeletal:         General: No swelling or deformity.      Cervical back: Neck supple.   Lymphadenopathy:      Cervical: No cervical adenopathy.   Skin:     General: Skin is warm.      Coloration: Skin is not jaundiced.      Findings: No rash.   Neurological:      General: No focal deficit present.      Mental Status: She is alert. Mental status is at baseline.   Psychiatric:         Mood and Affect: Mood normal.         Behavior: Behavior normal.         Thought Content: Thought content normal.                Relevant Results      Assessment/Plan:      1) Chronic epigastric/LUQ pain -  Pt to undergo EGD.  Benefits and risk of procedure including infection, bleeding, reaction to sedation  and bowel perforation ( estimated of 1 of every 2,500) was discussed with pt and voiced understanding and agreed to proceed.  All questions were answered.     Pending on findings, if sx persist, may need to consider repeat imaging with IV contrast.    Pt to follow up after above study completed    Elena Renteria PA-C

## 2025-04-24 ASSESSMENT — ENCOUNTER SYMPTOMS
BRUISES/BLEEDS EASILY: 0
CONFUSION: 0
CONSTIPATION: 0
WEAKNESS: 0
BLOOD IN STOOL: 0
DYSPHORIC MOOD: 0
JOINT SWELLING: 0
DYSURIA: 0
UNEXPECTED WEIGHT CHANGE: 0
AGITATION: 0
EYE REDNESS: 0
APPETITE CHANGE: 0
TROUBLE SWALLOWING: 0
CHEST TIGHTNESS: 0
DIARRHEA: 0
CHOKING: 0
HEMATURIA: 0
SORE THROAT: 0
VOMITING: 0
NAUSEA: 0
COUGH: 0
ABDOMINAL PAIN: 1

## 2025-04-29 ENCOUNTER — APPOINTMENT (OUTPATIENT)
Dept: VASCULAR SURGERY | Facility: CLINIC | Age: 60
End: 2025-04-29
Payer: MEDICARE

## 2025-04-29 ASSESSMENT — ENCOUNTER SYMPTOMS
DYSURIA: 0
BELCHING: 1
HEADACHES: 0
FREQUENCY: 0
ARTHRALGIAS: 0
FLATUS: 0
HEMATURIA: 0
FEVER: 0
VOMITING: 1
DIARRHEA: 1
ABDOMINAL PAIN: 1
HEMATOCHEZIA: 0
CONSTIPATION: 0
MYALGIAS: 0
WEIGHT LOSS: 1
ANOREXIA: 0
NAUSEA: 1

## 2025-05-06 ENCOUNTER — APPOINTMENT (OUTPATIENT)
Dept: PRIMARY CARE | Facility: CLINIC | Age: 60
End: 2025-05-06
Payer: MEDICARE

## 2025-05-06 VITALS
SYSTOLIC BLOOD PRESSURE: 110 MMHG | BODY MASS INDEX: 35.89 KG/M2 | OXYGEN SATURATION: 95 % | HEIGHT: 58 IN | RESPIRATION RATE: 18 BRPM | WEIGHT: 171 LBS | HEART RATE: 92 BPM | TEMPERATURE: 96.9 F | DIASTOLIC BLOOD PRESSURE: 64 MMHG

## 2025-05-06 DIAGNOSIS — F10.29 ALCOHOL DEPENDENCE WITH UNSPECIFIED ALCOHOL-INDUCED DISORDER: ICD-10-CM

## 2025-05-06 DIAGNOSIS — E11.9 TYPE 2 DIABETES MELLITUS WITHOUT COMPLICATION, WITHOUT LONG-TERM CURRENT USE OF INSULIN: ICD-10-CM

## 2025-05-06 DIAGNOSIS — J44.9 COPD, SEVERE (MULTI): Primary | ICD-10-CM

## 2025-05-06 LAB — POC HEMOGLOBIN A1C: 5.8 % (ref 4.2–6.5)

## 2025-05-06 PROCEDURE — 83036 HEMOGLOBIN GLYCOSYLATED A1C: CPT | Performed by: FAMILY MEDICINE

## 2025-05-06 PROCEDURE — 99213 OFFICE O/P EST LOW 20 MIN: CPT | Performed by: FAMILY MEDICINE

## 2025-05-06 RX ORDER — AZITHROMYCIN 250 MG/1
TABLET, FILM COATED ORAL
Start: 2025-05-06

## 2025-05-06 ASSESSMENT — ENCOUNTER SYMPTOMS
CONSTIPATION: 0
FEVER: 0
NAUSEA: 1
HEMATOCHEZIA: 0
DIARRHEA: 1
MYALGIAS: 0
ABDOMINAL PAIN: 1
ARTHRALGIAS: 0
FLATUS: 0
HEADACHES: 0
FREQUENCY: 0
WEIGHT LOSS: 1
VOMITING: 1
BELCHING: 1
HEMATURIA: 0
ANOREXIA: 0
DYSURIA: 0

## 2025-05-06 ASSESSMENT — PATIENT HEALTH QUESTIONNAIRE - PHQ9
2. FEELING DOWN, DEPRESSED OR HOPELESS: NOT AT ALL
1. LITTLE INTEREST OR PLEASURE IN DOING THINGS: NOT AT ALL
SUM OF ALL RESPONSES TO PHQ9 QUESTIONS 1 AND 2: 0

## 2025-05-06 NOTE — PROGRESS NOTES
"Subjective   Patient ID: Lauren Thompson is a 60 y.o. female who presents for Med Refill (6 month med check DM and A1C. ).    Abdominal Pain  This is a chronic problem. The current episode started more than 1 month ago. The onset quality is undetermined. The problem occurs 2 to 4 times per day. The most recent episode lasted 1 hours. The problem has been waxing and waning. The pain is located in the LUQ. The pain is at a severity of 7/10. The quality of the pain is cramping, dull and sharp. The abdominal pain radiates to the LUQ and left flank. Associated symptoms include belching, diarrhea, nausea, vomiting and weight loss. Pertinent negatives include no anorexia, arthralgias, constipation, dysuria, fever, flatus, frequency, headaches, hematochezia, hematuria, melena or myalgias. Nothing aggravates the pain. The pain is relieved by Nothing. Prior diagnostic workup includes CT scan, GI consult and ultrasound.        Review of Systems   Constitutional:  Positive for weight loss. Negative for fever.   Gastrointestinal:  Positive for abdominal pain, diarrhea, nausea and vomiting. Negative for anorexia, constipation, flatus, hematochezia and melena.   Genitourinary:  Negative for dysuria, frequency and hematuria.   Musculoskeletal:  Negative for arthralgias and myalgias.   Neurological:  Negative for headaches.       Objective   /64   Pulse 92   Temp 36.1 °C (96.9 °F)   Resp 18   Ht 1.473 m (4' 10\")   Wt 77.6 kg (171 lb)   SpO2 95% Comment: 2 liters 02  BMI 35.74 kg/m²     Physical Exam  Constitutional:       Appearance: Normal appearance.   HENT:      Head: Normocephalic.   Eyes:      Conjunctiva/sclera: Conjunctivae normal.   Cardiovascular:      Rate and Rhythm: Normal rate and regular rhythm.      Heart sounds: Normal heart sounds.   Pulmonary:      Effort: Pulmonary effort is normal.      Breath sounds: Normal breath sounds.   Musculoskeletal:      Cervical back: Neck supple.   Skin:     General: Skin " is warm and dry.   Neurological:      Mental Status: She is alert.         Assessment/Plan   Problem List Items Addressed This Visit           ICD-10-CM    Type 2 diabetes mellitus without complication, without long-term current use of insulin E11.9    Relevant Orders    POCT glycosylated hemoglobin (Hb A1C) manually resulted (Completed)    Alcohol dependence with unspecified alcohol-induced disorder F10.29    Dx reviewed stable          Other Visit Diagnoses         Codes      COPD, severe (Multi)    -  Primary J44.9    Relevant Medications    azithromycin (Zithromax) 250 mg tablet

## 2025-05-20 ENCOUNTER — ANESTHESIA EVENT (OUTPATIENT)
Dept: GASTROENTEROLOGY | Facility: HOSPITAL | Age: 60
End: 2025-05-20
Payer: MEDICARE

## 2025-05-20 ENCOUNTER — ANESTHESIA (OUTPATIENT)
Dept: GASTROENTEROLOGY | Facility: HOSPITAL | Age: 60
End: 2025-05-20
Payer: MEDICARE

## 2025-05-20 ENCOUNTER — HOSPITAL ENCOUNTER (OUTPATIENT)
Dept: GASTROENTEROLOGY | Facility: HOSPITAL | Age: 60
Discharge: HOME | End: 2025-05-20
Payer: MEDICARE

## 2025-05-20 VITALS
DIASTOLIC BLOOD PRESSURE: 84 MMHG | HEART RATE: 83 BPM | RESPIRATION RATE: 23 BRPM | OXYGEN SATURATION: 96 % | WEIGHT: 171 LBS | TEMPERATURE: 97 F | BODY MASS INDEX: 35.74 KG/M2 | SYSTOLIC BLOOD PRESSURE: 136 MMHG

## 2025-05-20 DIAGNOSIS — R10.12 LUQ ABDOMINAL PAIN: Primary | ICD-10-CM

## 2025-05-20 PROBLEM — J42 CHRONIC BRONCHITIS (MULTI): Status: ACTIVE | Noted: 2023-07-13

## 2025-05-20 PROBLEM — K76.0 FATTY LIVER: Status: ACTIVE | Noted: 2025-05-20

## 2025-05-20 PROBLEM — K21.9 GASTROESOPHAGEAL REFLUX DISEASE: Status: ACTIVE | Noted: 2025-05-20

## 2025-05-20 LAB — GLUCOSE BLD MANUAL STRIP-MCNC: 87 MG/DL (ref 74–99)

## 2025-05-20 PROCEDURE — A43239 PR EDG TRANSORAL BIOPSY SINGLE/MULTIPLE: Performed by: STUDENT IN AN ORGANIZED HEALTH CARE EDUCATION/TRAINING PROGRAM

## 2025-05-20 PROCEDURE — A43239 PR EDG TRANSORAL BIOPSY SINGLE/MULTIPLE

## 2025-05-20 PROCEDURE — 7100000009 HC PHASE TWO TIME - INITIAL BASE CHARGE

## 2025-05-20 PROCEDURE — 2500000004 HC RX 250 GENERAL PHARMACY W/ HCPCS (ALT 636 FOR OP/ED): Mod: JZ

## 2025-05-20 PROCEDURE — 3700000001 HC GENERAL ANESTHESIA TIME - INITIAL BASE CHARGE

## 2025-05-20 PROCEDURE — 43239 EGD BIOPSY SINGLE/MULTIPLE: CPT | Performed by: STUDENT IN AN ORGANIZED HEALTH CARE EDUCATION/TRAINING PROGRAM

## 2025-05-20 PROCEDURE — 7100000010 HC PHASE TWO TIME - EACH INCREMENTAL 1 MINUTE

## 2025-05-20 PROCEDURE — 3700000002 HC GENERAL ANESTHESIA TIME - EACH INCREMENTAL 1 MINUTE

## 2025-05-20 PROCEDURE — 82947 ASSAY GLUCOSE BLOOD QUANT: CPT

## 2025-05-20 RX ORDER — LIDOCAINE IN NACL,ISO-OSMOT/PF 30 MG/3 ML
0.1 SYRINGE (ML) INJECTION ONCE
OUTPATIENT
Start: 2025-05-20 | End: 2025-05-20

## 2025-05-20 RX ORDER — ONDANSETRON HYDROCHLORIDE 2 MG/ML
4 INJECTION, SOLUTION INTRAVENOUS ONCE AS NEEDED
OUTPATIENT
Start: 2025-05-20

## 2025-05-20 RX ORDER — FENTANYL CITRATE 50 UG/ML
INJECTION, SOLUTION INTRAMUSCULAR; INTRAVENOUS AS NEEDED
Status: DISCONTINUED | OUTPATIENT
Start: 2025-05-20 | End: 2025-05-20

## 2025-05-20 RX ORDER — MIDAZOLAM HYDROCHLORIDE 1 MG/ML
INJECTION INTRAMUSCULAR; INTRAVENOUS AS NEEDED
Status: DISCONTINUED | OUTPATIENT
Start: 2025-05-20 | End: 2025-05-20

## 2025-05-20 RX ORDER — HYDROMORPHONE HYDROCHLORIDE 1 MG/ML
0.5 INJECTION, SOLUTION INTRAMUSCULAR; INTRAVENOUS; SUBCUTANEOUS EVERY 5 MIN PRN
OUTPATIENT
Start: 2025-05-20

## 2025-05-20 RX ORDER — METOCLOPRAMIDE HYDROCHLORIDE 5 MG/ML
10 INJECTION INTRAMUSCULAR; INTRAVENOUS ONCE AS NEEDED
OUTPATIENT
Start: 2025-05-20

## 2025-05-20 RX ORDER — OXYCODONE HYDROCHLORIDE 5 MG/1
5 TABLET ORAL EVERY 4 HOURS PRN
Refills: 0 | OUTPATIENT
Start: 2025-05-20

## 2025-05-20 RX ORDER — PROPOFOL 10 MG/ML
INJECTION, EMULSION INTRAVENOUS CONTINUOUS PRN
Status: DISCONTINUED | OUTPATIENT
Start: 2025-05-20 | End: 2025-05-20

## 2025-05-20 RX ORDER — ALBUTEROL SULFATE 0.83 MG/ML
2.5 SOLUTION RESPIRATORY (INHALATION) ONCE AS NEEDED
OUTPATIENT
Start: 2025-05-20

## 2025-05-20 RX ORDER — SODIUM CHLORIDE, SODIUM LACTATE, POTASSIUM CHLORIDE, CALCIUM CHLORIDE 600; 310; 30; 20 MG/100ML; MG/100ML; MG/100ML; MG/100ML
100 INJECTION, SOLUTION INTRAVENOUS CONTINUOUS
OUTPATIENT
Start: 2025-05-20 | End: 2025-05-21

## 2025-05-20 RX ORDER — FENTANYL CITRATE 50 UG/ML
25 INJECTION, SOLUTION INTRAMUSCULAR; INTRAVENOUS EVERY 5 MIN PRN
Refills: 0 | OUTPATIENT
Start: 2025-05-20

## 2025-05-20 RX ADMIN — PROPOFOL 40 MG: 10 INJECTION, EMULSION INTRAVENOUS at 15:27

## 2025-05-20 RX ADMIN — FENTANYL CITRATE 12.5 MCG: 50 INJECTION, SOLUTION INTRAMUSCULAR; INTRAVENOUS at 15:26

## 2025-05-20 RX ADMIN — SODIUM CHLORIDE, SODIUM LACTATE, POTASSIUM CHLORIDE, AND CALCIUM CHLORIDE: 600; 310; 30; 20 INJECTION, SOLUTION INTRAVENOUS at 15:16

## 2025-05-20 RX ADMIN — PROPOFOL 100 MCG/KG/MIN: 10 INJECTION, EMULSION INTRAVENOUS at 15:26

## 2025-05-20 RX ADMIN — PROPOFOL 40 MG: 10 INJECTION, EMULSION INTRAVENOUS at 15:25

## 2025-05-20 RX ADMIN — FENTANYL CITRATE 12.5 MCG: 50 INJECTION, SOLUTION INTRAMUSCULAR; INTRAVENOUS at 15:29

## 2025-05-20 RX ADMIN — MIDAZOLAM HYDROCHLORIDE 2 MG: 2 INJECTION, SOLUTION INTRAMUSCULAR; INTRAVENOUS at 15:26

## 2025-05-20 RX ADMIN — FENTANYL CITRATE 12.5 MCG: 50 INJECTION, SOLUTION INTRAMUSCULAR; INTRAVENOUS at 15:31

## 2025-05-20 SDOH — HEALTH STABILITY: MENTAL HEALTH: CURRENT SMOKER: 0

## 2025-05-20 ASSESSMENT — COLUMBIA-SUICIDE SEVERITY RATING SCALE - C-SSRS
2. HAVE YOU ACTUALLY HAD ANY THOUGHTS OF KILLING YOURSELF?: NO
6. HAVE YOU EVER DONE ANYTHING, STARTED TO DO ANYTHING, OR PREPARED TO DO ANYTHING TO END YOUR LIFE?: NO
1. IN THE PAST MONTH, HAVE YOU WISHED YOU WERE DEAD OR WISHED YOU COULD GO TO SLEEP AND NOT WAKE UP?: NO

## 2025-05-20 ASSESSMENT — PAIN SCALES - GENERAL
PAINLEVEL_OUTOF10: 0 - NO PAIN

## 2025-05-20 ASSESSMENT — PAIN - FUNCTIONAL ASSESSMENT
PAIN_FUNCTIONAL_ASSESSMENT: 0-10

## 2025-05-20 NOTE — H&P
History Of Present Illness  Lauren Thompson is a 60 y.o. female presenting with dyspepsia.     Past Medical History  Medical History[1]  Surgical History  Surgical History[2]  Social History  She reports that she quit smoking about 2 years ago. Her smoking use included cigarettes. She started smoking about 43 years ago. She has a 41.5 pack-year smoking history. She has never used smokeless tobacco. She reports that she does not currently use alcohol. She reports that she does not use drugs.    Family History  Family History[3]     Allergies  Allergies[4]  Review of Systems   All other systems reviewed and are negative.       Physical Exam  HENT:      Head: Normocephalic.      Nose: Nose normal.      Mouth/Throat:      Mouth: Mucous membranes are moist.   Cardiovascular:      Rate and Rhythm: Normal rate.   Pulmonary:      Effort: Pulmonary effort is normal.   Abdominal:      General: Abdomen is flat.   Musculoskeletal:      Cervical back: Normal range of motion.   Skin:     General: Skin is warm.   Neurological:      General: No focal deficit present.      Mental Status: She is alert.          Last Recorded Vitals  Blood pressure 123/70, pulse 81, temperature 36.1 °C (97 °F), temperature source Temporal, resp. rate 18, weight 77.6 kg (171 lb), SpO2 95%.    Assessment/Plan   EGD for dyspepsia     Mauri Leal MD       [1]   Past Medical History:  Diagnosis Date    Anxiety     Arthritis     COPD (chronic obstructive pulmonary disease) (Multi)     COPD (chronic obstructive pulmonary disease) with acute bronchitis (Multi) 03/03/2024    Coronary artery disease     Depression 9/25/2023    Diabetes mellitus (Multi)     Disease of thyroid gland     Headache     Heart disease     Hypertension    [2]   Past Surgical History:  Procedure Laterality Date    APPENDECTOMY      BLADDER SURGERY  09/25/2023    CARDIAC CATHETERIZATION      HYSTERECTOMY      OTHER SURGICAL HISTORY  07/08/2019    Hysterectomy    OTHER SURGICAL HISTORY   07/08/2019    Cardiac catheterization x 3    OTHER SURGICAL HISTORY  07/08/2019    Appendectomy    OTHER SURGICAL HISTORY  07/08/2019    Tonsillectomy with adenoidectomy    PARTIAL HYSTERECTOMY  09/25/2023    SINUS SURGERY      TONSILLECTOMY      WISDOM TOOTH EXTRACTION     [3]   Family History  Problem Relation Name Age of Onset    Other (Cardiac disorder) Mother Justyna Stottlemire     Other (CVA) Mother Justyna Stottlemire     Hypertension Mother Justyna Stottlemire     Other (Diabetes mellitus) Mother Justyna Stottlemire     Heart disease Mother Justyna Stottlemire     Kidney disease Mother Justyna Stottlemire     Stroke Mother Justyna Stottlemire     Alcohol abuse Mother Justyna Stottlemire     COPD Mother Justyna Stottlemire     Diabetes Mother Justyna Stottlemire     Cancer Father Oswaldo Knisley     Heart disease Father Oswaldo Knisley     Hypertension Father Oswaldo Knisley     Lung disease Father Oswaldo Knisley     Stroke Father Oswaldo Knisley     Arthritis Father Oswaldo Knisley     COPD Father Oswaldo Knisley     Diabetes Father Oswaldo Knisley     Cancer Sister Sunshine Claudia     Depression Sister Sunshine [place]     Ovarian cancer Sister Sunshine Claudia     Depression Sister Jacquie Carl     Diabetes type II Sister Jacquie Carl     Hypertension Sister Jacquie Carl     Obesity Sister Jacquie Carl     Drug abuse Sister Jacquie Carl     Depression Sister Priya Gage     Diabetes type I Sister Lisa De Leon     Hypertension Sister Lisa De Leon     Thyroid disease Sister Lisa De Leon     Accidental death Sister Lisa De Leon     Diabetes Sister Lisa De Leon     Drug abuse Sister Lisa De Leon     Heart attack Sister Sweetie Saenz     Heart disease Sister Sweetie Saenz     Hypertension Sister Sweetie Saenz     Obesity Sister Sweetie Saenz     COPD Sister Sweetie Saenz     Diabetes Sister Sweetie Saenz     Hypertension Sister Miley Heuy     Stroke Sister Miley Heuy     Heart disease Maternal Grandmother Sweetie Clay     Kidney disease Maternal Grandmother Sweetie Clay      Diabetes Maternal Grandmother Sweetie Obed     Cancer Paternal Grandmother Cheyanne Arreaga     Breast cancer Paternal Grandmother Cheyanne Arreaga    [4] No Known Allergies

## 2025-05-20 NOTE — ANESTHESIA PREPROCEDURE EVALUATION
Patient: Lauren Thompson    Procedure Information       Date/Time: 05/20/25 1430    Scheduled providers: Mauri Leal MD    Procedure: EGD    Location: [facility]            Relevant Problems   Cardiac   (+) Angina, class IV   (+) CAD (coronary artery disease)   (+) HTN (hypertension), benign   (+) Hyperlipidemia   (+) Other chest pain   (+) Pure hypercholesterolemia   (+) Superior mesenteric artery stenosis (Multi)      Pulmonary   (+) Chronic bronchitis (Multi)      Neuro   (+) Anxiety   (+) Asymptomatic carotid artery stenosis   (+) Depression   (+) Shingles (herpes zoster) polyneuropathy      GI   (+) Gastroesophageal reflux disease      Liver   (+) Fatty liver      Endocrine   (+) Class 2 obesity in adult   (+) Hypothyroidism   (+) Type 2 diabetes mellitus without complication, without long-term current use of insulin       Clinical information reviewed:   Tobacco  Allergies  Meds   Med Hx  Surg Hx  OB Status  Fam Hx  Soc   Hx        NPO Detail:  NPO/Void Status  Date of Last Liquid: 05/20/25  Time of Last Liquid: 0800  Date of Last Solid: 05/19/25  Time of Last Solid: 1800  Last Intake Type: Clear fluids  Time of Last Void: 1000         Physical Exam    Airway  Mallampati: I  TM distance: >3 FB  Neck ROM: full  Mouth opening: 3 or more finger widths     Cardiovascular - normal exam  Rhythm: regular  Rate: normal     Dental    Pulmonary - normal exam(+) decreased breath sounds     Abdominal (+) obese             Anesthesia Plan    History of general anesthesia?: yes  History of complications of general anesthesia?: no    ASA 3     MAC   (    Risks discussed to Patient's satisfaction  )  The patient is not a current smoker.    intravenous induction   Postoperative pain plan includes opioids.  Anesthetic plan and risks discussed with patient and spouse.    Plan discussed with CRNA and CAA.

## 2025-05-20 NOTE — ANESTHESIA POSTPROCEDURE EVALUATION
Patient: Lauren Thompson    Procedure Summary       Date: 05/20/25 Room / Location: [facility]    Anesthesia Start: 1516 Anesthesia Stop: 1540    Procedure: EGD Diagnosis:       LUQ abdominal pain      Dyspepsia      LUQ abdominal pain    Scheduled Providers: Mauri Leal MD Responsible Provider: Min Riddle DO    Anesthesia Type: MAC ASA Status: 3            Anesthesia Type: MAC    Vitals Value Taken Time   /64 05/20/25 15:40   Temp 36.0 05/20/25 15:40   Pulse 88 05/20/25 15:40   Resp 19 05/20/25 15:40   SpO2 99 05/20/25 15:40       Anesthesia Post Evaluation    Patient location during evaluation: PACU  Patient participation: complete - patient cannot participate  Level of consciousness: sleepy but conscious  Pain management: adequate  Airway patency: patent  Cardiovascular status: acceptable  Respiratory status: acceptable  Hydration status: acceptable  Postoperative Nausea and Vomiting: none        No notable events documented.

## 2025-05-21 ENCOUNTER — APPOINTMENT (OUTPATIENT)
Dept: VASCULAR SURGERY | Facility: CLINIC | Age: 60
End: 2025-05-21
Payer: MEDICARE

## 2025-06-04 LAB
LABORATORY COMMENT REPORT: NORMAL
PATH REPORT.FINAL DX SPEC: NORMAL
PATH REPORT.GROSS SPEC: NORMAL
PATH REPORT.TOTAL CANCER: NORMAL

## 2025-06-05 DIAGNOSIS — E11.9 TYPE 2 DIABETES MELLITUS WITHOUT COMPLICATION, WITHOUT LONG-TERM CURRENT USE OF INSULIN: Primary | ICD-10-CM

## 2025-06-05 DIAGNOSIS — I10 HTN (HYPERTENSION), BENIGN: ICD-10-CM

## 2025-06-05 DIAGNOSIS — I73.00 RAYNAUD'S PHENOMENON WITHOUT GANGRENE: ICD-10-CM

## 2025-06-06 ENCOUNTER — PATIENT MESSAGE (OUTPATIENT)
Dept: GASTROENTEROLOGY | Facility: HOSPITAL | Age: 60
End: 2025-06-06
Payer: MEDICARE

## 2025-06-06 DIAGNOSIS — R12 HEARTBURN: Primary | ICD-10-CM

## 2025-06-06 RX ORDER — PANTOPRAZOLE SODIUM 40 MG/1
40 TABLET, DELAYED RELEASE ORAL
Qty: 30 TABLET | Refills: 1 | Status: SHIPPED | OUTPATIENT
Start: 2025-06-06 | End: 2026-06-06

## 2025-06-11 LAB
ALBUMIN SERPL-MCNC: 4.7 G/DL (ref 3.6–5.1)
ALP SERPL-CCNC: 102 U/L (ref 37–153)
ALT SERPL-CCNC: 19 U/L (ref 6–29)
ANION GAP SERPL CALCULATED.4IONS-SCNC: 11 MMOL/L (CALC) (ref 7–17)
AST SERPL-CCNC: 18 U/L (ref 10–35)
BILIRUB SERPL-MCNC: 0.4 MG/DL (ref 0.2–1.2)
BUN SERPL-MCNC: 25 MG/DL (ref 7–25)
CALCIUM SERPL-MCNC: 9.6 MG/DL (ref 8.6–10.4)
CHLORIDE SERPL-SCNC: 99 MMOL/L (ref 98–110)
CO2 SERPL-SCNC: 27 MMOL/L (ref 20–32)
CREAT SERPL-MCNC: 1.21 MG/DL (ref 0.5–1.05)
EGFRCR SERPLBLD CKD-EPI 2021: 51 ML/MIN/1.73M2
GLUCOSE SERPL-MCNC: 90 MG/DL (ref 65–99)
POTASSIUM SERPL-SCNC: 4.7 MMOL/L (ref 3.5–5.3)
PROT SERPL-MCNC: 6.9 G/DL (ref 6.1–8.1)
SODIUM SERPL-SCNC: 137 MMOL/L (ref 135–146)

## 2025-06-12 ENCOUNTER — TELEPHONE (OUTPATIENT)
Dept: PRIMARY CARE | Facility: CLINIC | Age: 60
End: 2025-06-12
Payer: MEDICARE

## 2025-06-12 NOTE — TELEPHONE ENCOUNTER
----- Message from Raghav Garnett sent at 6/12/2025  7:43 AM EDT -----  She was a little dehydrated on her lab result but otherwise it looked good  ----- Message -----  From: [employer] Results In  Sent: 6/11/2025  10:22 PM EDT  To: Raghav Garnett, DO

## 2025-06-20 ENCOUNTER — APPOINTMENT (OUTPATIENT)
Dept: GASTROENTEROLOGY | Facility: HOSPITAL | Age: 60
End: 2025-06-20
Payer: MEDICARE

## 2025-06-24 ASSESSMENT — ENCOUNTER SYMPTOMS
DYSPHORIC MOOD: 0
CHOKING: 0
APPETITE CHANGE: 0
JOINT SWELLING: 0
HEMATURIA: 0
TROUBLE SWALLOWING: 0
NAUSEA: 0
WEAKNESS: 0
AGITATION: 0
BLOOD IN STOOL: 0
CONFUSION: 0
COUGH: 0
BRUISES/BLEEDS EASILY: 0
SORE THROAT: 0
DIARRHEA: 0
CONSTIPATION: 0
DYSURIA: 0
CHEST TIGHTNESS: 0
EYE REDNESS: 0
UNEXPECTED WEIGHT CHANGE: 0
VOMITING: 0
ABDOMINAL PAIN: 1

## 2025-06-24 NOTE — PROGRESS NOTES
"History Of Present Illness  Lauren Thompson is a 60 y.o. female with h/o HTN, CAD, DM II, COPD (2L O2 NC, wearing at night and when active during the day) and hypothyroidism who was here initially on April 2025,  for generalized abd pain.    Pt having LUQ abdominal pain since Feb 2025.  Pt described it as dull ache, and sometimes can be so \"sharp\" it makes her nauseous (occurs 2x/week).   This pain can be random. It usually occurs after eating, but can also occur without eating anything.  Pt has a BM daily, but it can vary btwn formed, loose to watery.  Pt denied melena or hematochezia. Pt denied any unintentional weight loss.     Pt saw vascular recently ( 4/16/25) secondary to a mesenteric vascular ultrasound revealing changes suggestive of SMA stenosis. However, her celiac and FAHAD were patent with no stenosis.  They wanted us, GI to work up other potential causes of her stomach pain.    Last screening colonoscopy was on Jan 2023. BBPS score - 9.  1 (9mm) polyp removed in sigmoid. 5 (4-6mm) polyps removed in sigmoid, non bleeding internal hemorrhoids. Bx - all were adenomatous.  Repeat surveillance in 3 yrs (Jan 2026).     Recent imaging:  CT abd/p (without contrast)  in Jan 2025 - No acute abnormalities. Mild colonic diverticulosis.   Pt's CBC, lipase and CMP (from Jan 2025) was normal.    Since last visit, we had pt undergo an EGD in May 2025.  It revealed 2 cm HH and some erythema in stomach and duodenum. Bx - stomach - reactive gastropathy, negative H.pylori.   I then started pt on Pantoprazole 40 mg daily and advised her to take up to 2 months.     Pt is back today for follow up.       Past Medical History  She has a past medical history of Anxiety, Arthritis, COPD (chronic obstructive pulmonary disease) (Multi), COPD (chronic obstructive pulmonary disease) with acute bronchitis (Multi) (03/03/2024), Coronary artery disease, Depression (9/25/2023), Diabetes mellitus (Multi), Disease of thyroid gland, " Headache, Heart disease, and Hypertension.    Surgical History  She has a past surgical history that includes Other surgical history (07/08/2019); Other surgical history (07/08/2019); Other surgical history (07/08/2019); Other surgical history (07/08/2019); Partial hysterectomy (09/25/2023); Bladder surgery (09/25/2023); Cardiac catheterization; Appendectomy; Hysterectomy; Sinus surgery; Tonsillectomy; and [place] tooth extraction.     Social History  She reports that she quit smoking about 2 years ago. Her smoking use included cigarettes. She started smoking about 44 years ago. She has a 41.5 pack-year smoking history. She has never used smokeless tobacco. She reports that she does not currently use alcohol. She reports that she does not use drugs.    Family History  No GI cancers  Allergies  Patient has no known allergies.      Review of Systems   Constitutional:  Negative for appetite change and unexpected weight change.   HENT:  Negative for sore throat and trouble swallowing.    Eyes:  Negative for redness.   Respiratory:  Negative for cough, choking and chest tightness.    Cardiovascular:  Negative for chest pain.   Gastrointestinal:  Positive for abdominal pain (LUQ and epigastric area). Negative for blood in stool, constipation, diarrhea, nausea and vomiting.   Genitourinary:  Negative for dysuria and hematuria.   Musculoskeletal:  Negative for joint swelling.   Skin:  Negative for rash.   Neurological:  Negative for weakness.   Hematological:  Does not bruise/bleed easily.   Psychiatric/Behavioral:  Negative for agitation, confusion and dysphoric mood.           There were no vitals taken for this visit.  Physical Exam  Vitals reviewed.   Constitutional:       General: She is not in acute distress.     Appearance: Normal appearance. She is normal weight. She is not ill-appearing.   HENT:      Head: Normocephalic and atraumatic.      Mouth/Throat:      Mouth: Mucous membranes are moist.      Pharynx:  Oropharynx is clear. No oropharyngeal exudate or posterior oropharyngeal erythema.   Eyes:      General: No scleral icterus.     Pupils: Pupils are equal, round, and reactive to light.   Cardiovascular:      Rate and Rhythm: Normal rate and regular rhythm.      Heart sounds: Normal heart sounds.   Pulmonary:      Effort: Pulmonary effort is normal. No respiratory distress.      Breath sounds: Wheezing (expiratory wheezing bilaterally) present.      Comments: Decreased breath sounds throughout  Abdominal:      General: Bowel sounds are normal. There is no distension.      Palpations: Abdomen is soft.      Tenderness: There is abdominal tenderness (in LUQ and epigastric region). There is no guarding or rebound.   Musculoskeletal:         General: No swelling or deformity.      Cervical back: Neck supple.   Lymphadenopathy:      Cervical: No cervical adenopathy.   Skin:     General: Skin is warm.      Coloration: Skin is not jaundiced.      Findings: No rash.   Neurological:      General: No focal deficit present.      Mental Status: She is alert. Mental status is at baseline.   Psychiatric:         Mood and Affect: Mood normal.         Behavior: Behavior normal.         Thought Content: Thought content normal.                Relevant Results      Assessment/Plan:  1) Chronic epigastric/LUQ pain -  S/p EGD in May 2020 revealing a 2cm HH and some reactive gastropathy.  Pt placed on daily PPI.     Elena Renteria PA-C

## 2025-06-25 ENCOUNTER — APPOINTMENT (OUTPATIENT)
Dept: GASTROENTEROLOGY | Facility: HOSPITAL | Age: 60
End: 2025-06-25
Payer: MEDICARE

## 2025-06-29 DIAGNOSIS — R12 HEARTBURN: ICD-10-CM

## 2025-06-30 RX ORDER — PANTOPRAZOLE SODIUM 40 MG/1
40 TABLET, DELAYED RELEASE ORAL
Qty: 90 TABLET | Refills: 0 | Status: SHIPPED | OUTPATIENT
Start: 2025-06-30 | End: 2026-06-30

## 2025-07-16 ENCOUNTER — APPOINTMENT (OUTPATIENT)
Dept: VASCULAR SURGERY | Facility: CLINIC | Age: 60
End: 2025-07-16
Payer: MEDICARE

## 2025-07-29 ENCOUNTER — APPOINTMENT (OUTPATIENT)
Dept: CARDIOLOGY | Facility: HOSPITAL | Age: 60
DRG: 190 | End: 2025-07-29
Payer: MEDICARE

## 2025-07-29 ENCOUNTER — APPOINTMENT (OUTPATIENT)
Dept: RADIOLOGY | Facility: HOSPITAL | Age: 60
DRG: 190 | End: 2025-07-29
Payer: MEDICARE

## 2025-07-29 ENCOUNTER — HOSPITAL ENCOUNTER (INPATIENT)
Facility: HOSPITAL | Age: 60
End: 2025-07-29
Attending: EMERGENCY MEDICINE | Admitting: STUDENT IN AN ORGANIZED HEALTH CARE EDUCATION/TRAINING PROGRAM
Payer: MEDICARE

## 2025-07-29 DIAGNOSIS — J96.21 ACUTE ON CHRONIC HYPOXIC RESPIRATORY FAILURE: ICD-10-CM

## 2025-07-29 DIAGNOSIS — F41.9 ANXIETY: ICD-10-CM

## 2025-07-29 DIAGNOSIS — J44.1 COPD EXACERBATION (MULTI): Primary | ICD-10-CM

## 2025-07-29 DIAGNOSIS — J96.22 ACUTE AND CHRONIC RESPIRATORY FAILURE WITH HYPERCAPNIA: ICD-10-CM

## 2025-07-29 DIAGNOSIS — J96.21 ACUTE ON CHRONIC RESPIRATORY FAILURE WITH HYPOXIA: ICD-10-CM

## 2025-07-29 PROBLEM — E87.20 LACTIC ACIDOSIS: Status: ACTIVE | Noted: 2025-07-29

## 2025-07-29 PROBLEM — N17.9 AKI (ACUTE KIDNEY INJURY): Status: ACTIVE | Noted: 2025-07-29

## 2025-07-29 LAB
ALBUMIN SERPL BCP-MCNC: 4.4 G/DL (ref 3.4–5)
ALP SERPL-CCNC: 108 U/L (ref 33–136)
ALT SERPL W P-5'-P-CCNC: 17 U/L (ref 7–45)
ANION GAP BLDA CALCULATED.4IONS-SCNC: 16 MMO/L (ref 10–25)
ANION GAP SERPL CALC-SCNC: 17 MMOL/L (ref 10–20)
ARTERIAL PATENCY WRIST A: ABNORMAL
AST SERPL W P-5'-P-CCNC: 18 U/L (ref 9–39)
ATRIAL RATE: 97 BPM
BASE EXCESS BLDA CALC-SCNC: -2.9 MMOL/L (ref -2–3)
BASOPHILS # BLD AUTO: 0.02 X10*3/UL (ref 0–0.1)
BASOPHILS NFR BLD AUTO: 0.2 %
BILIRUB SERPL-MCNC: 0.4 MG/DL (ref 0–1.2)
BNP SERPL-MCNC: 19 PG/ML (ref 0–99)
BODY TEMPERATURE: ABNORMAL
BUN SERPL-MCNC: 41 MG/DL (ref 6–23)
CA-I BLDA-SCNC: 1.2 MMOL/L (ref 1.1–1.33)
CALCIUM SERPL-MCNC: 9.2 MG/DL (ref 8.6–10.3)
CARDIAC TROPONIN I PNL SERPL HS: 3 NG/L (ref 0–13)
CARDIAC TROPONIN I PNL SERPL HS: 3 NG/L (ref 0–13)
CHLORIDE BLDA-SCNC: 100 MMOL/L (ref 98–107)
CHLORIDE SERPL-SCNC: 99 MMOL/L (ref 98–107)
CO2 SERPL-SCNC: 23 MMOL/L (ref 21–32)
CREAT SERPL-MCNC: 1.86 MG/DL (ref 0.5–1.05)
D DIMER PPP FEU-MCNC: 286 NG/ML FEU
EGFRCR SERPLBLD CKD-EPI 2021: 30 ML/MIN/1.73M*2
EOSINOPHIL # BLD AUTO: 0 X10*3/UL (ref 0–0.7)
EOSINOPHIL NFR BLD AUTO: 0 %
ERYTHROCYTE [DISTWIDTH] IN BLOOD BY AUTOMATED COUNT: 12.9 % (ref 11.5–14.5)
GLUCOSE BLD MANUAL STRIP-MCNC: 177 MG/DL (ref 74–99)
GLUCOSE BLDA-MCNC: 174 MG/DL (ref 74–99)
GLUCOSE SERPL-MCNC: 201 MG/DL (ref 74–99)
HCO3 BLDA-SCNC: 22.6 MMOL/L (ref 22–26)
HCT VFR BLD AUTO: 37.7 % (ref 36–46)
HCT VFR BLD EST: 38 % (ref 36–46)
HGB BLD-MCNC: 12.5 G/DL (ref 12–16)
HGB BLDA-MCNC: 12.5 G/DL (ref 12–16)
IMM GRANULOCYTES # BLD AUTO: 0.06 X10*3/UL (ref 0–0.7)
IMM GRANULOCYTES NFR BLD AUTO: 0.7 % (ref 0–0.9)
INHALED O2 CONCENTRATION: 32 %
INR PPP: 0.9 (ref 0.9–1.1)
LACTATE BLDA-SCNC: 3 MMOL/L (ref 0.4–2)
LACTATE BLDV-SCNC: 2.7 MMOL/L (ref 0.4–2)
LACTATE SERPL-SCNC: 2.7 MMOL/L (ref 0.4–2)
LACTATE SERPL-SCNC: 3.5 MMOL/L (ref 0.4–2)
LYMPHOCYTES # BLD AUTO: 0.52 X10*3/UL (ref 1.2–4.8)
LYMPHOCYTES NFR BLD AUTO: 6.2 %
MAGNESIUM SERPL-MCNC: 1.98 MG/DL (ref 1.6–2.4)
MCH RBC QN AUTO: 30.2 PG (ref 26–34)
MCHC RBC AUTO-ENTMCNC: 33.2 G/DL (ref 32–36)
MCV RBC AUTO: 91 FL (ref 80–100)
MONOCYTES # BLD AUTO: 0.31 X10*3/UL (ref 0.1–1)
MONOCYTES NFR BLD AUTO: 3.7 %
NEUTROPHILS # BLD AUTO: 7.42 X10*3/UL (ref 1.2–7.7)
NEUTROPHILS NFR BLD AUTO: 89.2 %
NRBC BLD-RTO: 0 /100 WBCS (ref 0–0)
OXYHGB MFR BLDA: 96.4 % (ref 94–98)
P AXIS: 91 DEGREES
P OFFSET: 183 MS
P ONSET: 138 MS
PCO2 BLDA: 41 MM HG (ref 38–42)
PH BLDA: 7.35 PH (ref 7.38–7.42)
PLATELET # BLD AUTO: 285 X10*3/UL (ref 150–450)
PO2 BLDA: 89 MM HG (ref 85–95)
POTASSIUM BLDA-SCNC: 4.4 MMOL/L (ref 3.5–5.3)
POTASSIUM SERPL-SCNC: 4.3 MMOL/L (ref 3.5–5.3)
PR INTERVAL: 170 MS
PROT SERPL-MCNC: 7.4 G/DL (ref 6.4–8.2)
PROTHROMBIN TIME: 10.4 SECONDS (ref 9.8–12.4)
Q ONSET: 223 MS
QRS COUNT: 16 BEATS
QRS DURATION: 82 MS
QT INTERVAL: 346 MS
QTC CALCULATION(BAZETT): 439 MS
QTC FREDERICIA: 405 MS
R AXIS: 81 DEGREES
RBC # BLD AUTO: 4.14 X10*6/UL (ref 4–5.2)
SAO2 % BLDA: 98 % (ref 94–100)
SARS-COV-2 RNA RESP QL NAA+PROBE: NOT DETECTED
SITE OF ARTERIAL PUNCTURE: ABNORMAL
SODIUM BLDA-SCNC: 134 MMOL/L (ref 136–145)
SODIUM SERPL-SCNC: 135 MMOL/L (ref 136–145)
T AXIS: 76 DEGREES
T OFFSET: 396 MS
VENTRICULAR RATE: 97 BPM
WBC # BLD AUTO: 8.3 X10*3/UL (ref 4.4–11.3)

## 2025-07-29 PROCEDURE — 99291 CRITICAL CARE FIRST HOUR: CPT | Performed by: EMERGENCY MEDICINE

## 2025-07-29 PROCEDURE — 83735 ASSAY OF MAGNESIUM: CPT | Performed by: EMERGENCY MEDICINE

## 2025-07-29 PROCEDURE — 83605 ASSAY OF LACTIC ACID: CPT | Performed by: EMERGENCY MEDICINE

## 2025-07-29 PROCEDURE — 84132 ASSAY OF SERUM POTASSIUM: CPT | Performed by: EMERGENCY MEDICINE

## 2025-07-29 PROCEDURE — 71045 X-RAY EXAM CHEST 1 VIEW: CPT | Performed by: RADIOLOGY

## 2025-07-29 PROCEDURE — 36415 COLL VENOUS BLD VENIPUNCTURE: CPT | Performed by: EMERGENCY MEDICINE

## 2025-07-29 PROCEDURE — 96365 THER/PROPH/DIAG IV INF INIT: CPT

## 2025-07-29 PROCEDURE — 94640 AIRWAY INHALATION TREATMENT: CPT

## 2025-07-29 PROCEDURE — 2500000004 HC RX 250 GENERAL PHARMACY W/ HCPCS (ALT 636 FOR OP/ED): Performed by: STUDENT IN AN ORGANIZED HEALTH CARE EDUCATION/TRAINING PROGRAM

## 2025-07-29 PROCEDURE — 82435 ASSAY OF BLOOD CHLORIDE: CPT | Performed by: EMERGENCY MEDICINE

## 2025-07-29 PROCEDURE — 2500000002 HC RX 250 W HCPCS SELF ADMINISTERED DRUGS (ALT 637 FOR MEDICARE OP, ALT 636 FOR OP/ED): Performed by: EMERGENCY MEDICINE

## 2025-07-29 PROCEDURE — 71250 CT THORAX DX C-: CPT

## 2025-07-29 PROCEDURE — 87635 SARS-COV-2 COVID-19 AMP PRB: CPT | Performed by: EMERGENCY MEDICINE

## 2025-07-29 PROCEDURE — 1200000002 HC GENERAL ROOM WITH TELEMETRY DAILY

## 2025-07-29 PROCEDURE — 2500000002 HC RX 250 W HCPCS SELF ADMINISTERED DRUGS (ALT 637 FOR MEDICARE OP, ALT 636 FOR OP/ED): Performed by: STUDENT IN AN ORGANIZED HEALTH CARE EDUCATION/TRAINING PROGRAM

## 2025-07-29 PROCEDURE — 96367 TX/PROPH/DG ADDL SEQ IV INF: CPT

## 2025-07-29 PROCEDURE — 2500000005 HC RX 250 GENERAL PHARMACY W/O HCPCS: Performed by: STUDENT IN AN ORGANIZED HEALTH CARE EDUCATION/TRAINING PROGRAM

## 2025-07-29 PROCEDURE — 83036 HEMOGLOBIN GLYCOSYLATED A1C: CPT | Mod: ELYLAB | Performed by: STUDENT IN AN ORGANIZED HEALTH CARE EDUCATION/TRAINING PROGRAM

## 2025-07-29 PROCEDURE — 84484 ASSAY OF TROPONIN QUANT: CPT | Performed by: EMERGENCY MEDICINE

## 2025-07-29 PROCEDURE — 71250 CT THORAX DX C-: CPT | Performed by: RADIOLOGY

## 2025-07-29 PROCEDURE — 96361 HYDRATE IV INFUSION ADD-ON: CPT

## 2025-07-29 PROCEDURE — 96375 TX/PRO/DX INJ NEW DRUG ADDON: CPT

## 2025-07-29 PROCEDURE — 2500000001 HC RX 250 WO HCPCS SELF ADMINISTERED DRUGS (ALT 637 FOR MEDICARE OP): Performed by: EMERGENCY MEDICINE

## 2025-07-29 PROCEDURE — 2500000005 HC RX 250 GENERAL PHARMACY W/O HCPCS: Performed by: EMERGENCY MEDICINE

## 2025-07-29 PROCEDURE — 85379 FIBRIN DEGRADATION QUANT: CPT | Performed by: EMERGENCY MEDICINE

## 2025-07-29 PROCEDURE — 85025 COMPLETE CBC W/AUTO DIFF WBC: CPT | Performed by: EMERGENCY MEDICINE

## 2025-07-29 PROCEDURE — 82805 BLOOD GASES W/O2 SATURATION: CPT | Performed by: EMERGENCY MEDICINE

## 2025-07-29 PROCEDURE — 99223 1ST HOSP IP/OBS HIGH 75: CPT | Performed by: STUDENT IN AN ORGANIZED HEALTH CARE EDUCATION/TRAINING PROGRAM

## 2025-07-29 PROCEDURE — 36600 WITHDRAWAL OF ARTERIAL BLOOD: CPT

## 2025-07-29 PROCEDURE — 85610 PROTHROMBIN TIME: CPT | Performed by: EMERGENCY MEDICINE

## 2025-07-29 PROCEDURE — 71045 X-RAY EXAM CHEST 1 VIEW: CPT

## 2025-07-29 PROCEDURE — 2500000001 HC RX 250 WO HCPCS SELF ADMINISTERED DRUGS (ALT 637 FOR MEDICARE OP): Performed by: STUDENT IN AN ORGANIZED HEALTH CARE EDUCATION/TRAINING PROGRAM

## 2025-07-29 PROCEDURE — 93005 ELECTROCARDIOGRAM TRACING: CPT

## 2025-07-29 PROCEDURE — 82947 ASSAY GLUCOSE BLOOD QUANT: CPT

## 2025-07-29 PROCEDURE — 84075 ASSAY ALKALINE PHOSPHATASE: CPT | Performed by: EMERGENCY MEDICINE

## 2025-07-29 PROCEDURE — 2500000004 HC RX 250 GENERAL PHARMACY W/ HCPCS (ALT 636 FOR OP/ED): Mod: JZ | Performed by: EMERGENCY MEDICINE

## 2025-07-29 PROCEDURE — 83880 ASSAY OF NATRIURETIC PEPTIDE: CPT | Performed by: EMERGENCY MEDICINE

## 2025-07-29 RX ORDER — DOXYCYCLINE HYCLATE 100 MG
100 TABLET ORAL EVERY 12 HOURS SCHEDULED
Status: COMPLETED | OUTPATIENT
Start: 2025-07-30 | End: 2025-08-03

## 2025-07-29 RX ORDER — HYDROXYZINE HYDROCHLORIDE 25 MG/1
25 TABLET, FILM COATED ORAL ONCE
Status: COMPLETED | OUTPATIENT
Start: 2025-07-29 | End: 2025-07-29

## 2025-07-29 RX ORDER — METOPROLOL SUCCINATE 25 MG/1
25 TABLET, EXTENDED RELEASE ORAL DAILY
Status: DISPENSED | OUTPATIENT
Start: 2025-07-30

## 2025-07-29 RX ORDER — IPRATROPIUM BROMIDE AND ALBUTEROL SULFATE 2.5; .5 MG/3ML; MG/3ML
3 SOLUTION RESPIRATORY (INHALATION)
Status: DISPENSED | OUTPATIENT
Start: 2025-07-30

## 2025-07-29 RX ORDER — TALC
3 POWDER (GRAM) TOPICAL NIGHTLY PRN
Status: DISPENSED | OUTPATIENT
Start: 2025-07-29

## 2025-07-29 RX ORDER — ALBUTEROL SULFATE 0.83 MG/ML
2.5 SOLUTION RESPIRATORY (INHALATION) ONCE
Status: COMPLETED | OUTPATIENT
Start: 2025-07-29 | End: 2025-07-29

## 2025-07-29 RX ORDER — SERTRALINE HYDROCHLORIDE 50 MG/1
100 TABLET, FILM COATED ORAL DAILY
Status: DISPENSED | OUTPATIENT
Start: 2025-07-30

## 2025-07-29 RX ORDER — ACETAMINOPHEN 325 MG/1
650 TABLET ORAL EVERY 4 HOURS PRN
Status: DISPENSED | OUTPATIENT
Start: 2025-07-29

## 2025-07-29 RX ORDER — DEXTROSE 50 % IN WATER (D50W) INTRAVENOUS SYRINGE
12.5
Status: ACTIVE | OUTPATIENT
Start: 2025-07-29

## 2025-07-29 RX ORDER — RANOLAZINE 500 MG/1
500 TABLET, EXTENDED RELEASE ORAL 2 TIMES DAILY
Status: DISPENSED | OUTPATIENT
Start: 2025-07-29

## 2025-07-29 RX ORDER — ONDANSETRON 4 MG/1
4 TABLET, FILM COATED ORAL EVERY 8 HOURS PRN
Status: ACTIVE | OUTPATIENT
Start: 2025-07-29

## 2025-07-29 RX ORDER — IPRATROPIUM BROMIDE AND ALBUTEROL SULFATE 2.5; .5 MG/3ML; MG/3ML
3 SOLUTION RESPIRATORY (INHALATION) EVERY 2 HOUR PRN
Status: DISPENSED | OUTPATIENT
Start: 2025-07-29

## 2025-07-29 RX ORDER — DEXTROSE 50 % IN WATER (D50W) INTRAVENOUS SYRINGE
25
Status: ACTIVE | OUTPATIENT
Start: 2025-07-29

## 2025-07-29 RX ORDER — IPRATROPIUM BROMIDE AND ALBUTEROL SULFATE 2.5; .5 MG/3ML; MG/3ML
3 SOLUTION RESPIRATORY (INHALATION) ONCE
Status: COMPLETED | OUTPATIENT
Start: 2025-07-29 | End: 2025-07-29

## 2025-07-29 RX ORDER — INSULIN LISPRO 100 [IU]/ML
0-5 INJECTION, SOLUTION INTRAVENOUS; SUBCUTANEOUS
Status: DISPENSED | OUTPATIENT
Start: 2025-07-29

## 2025-07-29 RX ORDER — ATORVASTATIN CALCIUM 80 MG/1
80 TABLET, FILM COATED ORAL NIGHTLY
Status: DISPENSED | OUTPATIENT
Start: 2025-07-29

## 2025-07-29 RX ORDER — ASPIRIN 81 MG/1
81 TABLET ORAL DAILY
Status: DISPENSED | OUTPATIENT
Start: 2025-07-30

## 2025-07-29 RX ORDER — ONDANSETRON HYDROCHLORIDE 2 MG/ML
4 INJECTION, SOLUTION INTRAVENOUS EVERY 8 HOURS PRN
Status: ACTIVE | OUTPATIENT
Start: 2025-07-29

## 2025-07-29 RX ORDER — MAGNESIUM SULFATE HEPTAHYDRATE 40 MG/ML
2 INJECTION, SOLUTION INTRAVENOUS ONCE
Status: COMPLETED | OUTPATIENT
Start: 2025-07-29 | End: 2025-07-29

## 2025-07-29 RX ORDER — LANOLIN ALCOHOL/MO/W.PET/CERES
400 CREAM (GRAM) TOPICAL DAILY
Status: DISPENSED | OUTPATIENT
Start: 2025-07-30

## 2025-07-29 RX ORDER — LEVOTHYROXINE SODIUM 88 UG/1
88 TABLET ORAL DAILY
Status: DISPENSED | OUTPATIENT
Start: 2025-07-30

## 2025-07-29 RX ORDER — GUAIFENESIN 600 MG/1
600 TABLET, EXTENDED RELEASE ORAL 2 TIMES DAILY
Status: DISPENSED | OUTPATIENT
Start: 2025-07-29

## 2025-07-29 RX ORDER — HEPARIN SODIUM 5000 [USP'U]/ML
5000 INJECTION, SOLUTION INTRAVENOUS; SUBCUTANEOUS EVERY 8 HOURS
Status: DISPENSED | OUTPATIENT
Start: 2025-07-29

## 2025-07-29 RX ORDER — POLYETHYLENE GLYCOL 3350 17 G/17G
17 POWDER, FOR SOLUTION ORAL DAILY
Status: DISPENSED | OUTPATIENT
Start: 2025-07-30

## 2025-07-29 RX ORDER — ACETAMINOPHEN 650 MG/1
650 SUPPOSITORY RECTAL EVERY 4 HOURS PRN
Status: ACTIVE | OUTPATIENT
Start: 2025-07-29

## 2025-07-29 RX ORDER — PANTOPRAZOLE SODIUM 40 MG/1
40 TABLET, DELAYED RELEASE ORAL
Status: DISPENSED | OUTPATIENT
Start: 2025-07-30

## 2025-07-29 RX ORDER — ACETAMINOPHEN 160 MG/5ML
650 SOLUTION ORAL EVERY 4 HOURS PRN
Status: ACTIVE | OUTPATIENT
Start: 2025-07-29

## 2025-07-29 RX ORDER — BENZONATATE 100 MG/1
100 CAPSULE ORAL 3 TIMES DAILY PRN
Status: DISPENSED | OUTPATIENT
Start: 2025-07-29

## 2025-07-29 RX ADMIN — MAGNESIUM SULFATE HEPTAHYDRATE 2 G: 40 INJECTION, SOLUTION INTRAVENOUS at 15:39

## 2025-07-29 RX ADMIN — IPRATROPIUM BROMIDE AND ALBUTEROL SULFATE 3 ML: .5; 3 SOLUTION RESPIRATORY (INHALATION) at 15:48

## 2025-07-29 RX ADMIN — HEPARIN SODIUM 5000 UNITS: 5000 INJECTION INTRAVENOUS; SUBCUTANEOUS at 22:05

## 2025-07-29 RX ADMIN — ATORVASTATIN CALCIUM 80 MG: 80 TABLET, FILM COATED ORAL at 22:05

## 2025-07-29 RX ADMIN — GUAIFENESIN 600 MG: 600 TABLET, EXTENDED RELEASE ORAL at 22:05

## 2025-07-29 RX ADMIN — Medication 1 DOSE: at 15:49

## 2025-07-29 RX ADMIN — DOXYCYCLINE 100 MG: 100 INJECTION, POWDER, LYOPHILIZED, FOR SOLUTION INTRAVENOUS at 18:37

## 2025-07-29 RX ADMIN — SODIUM CHLORIDE 1000 ML: 9 INJECTION, SOLUTION INTRAVENOUS at 16:50

## 2025-07-29 RX ADMIN — BENZONATATE 100 MG: 100 CAPSULE ORAL at 22:10

## 2025-07-29 RX ADMIN — IPRATROPIUM BROMIDE AND ALBUTEROL SULFATE 3 ML: .5; 3 SOLUTION RESPIRATORY (INHALATION) at 18:53

## 2025-07-29 RX ADMIN — Medication 1 DOSE: at 22:06

## 2025-07-29 RX ADMIN — HYDROXYZINE HYDROCHLORIDE 25 MG: 25 TABLET ORAL at 18:50

## 2025-07-29 RX ADMIN — SODIUM CHLORIDE 1000 ML: 0.9 INJECTION, SOLUTION INTRAVENOUS at 15:39

## 2025-07-29 RX ADMIN — INSULIN LISPRO 1 UNITS: 100 INJECTION, SOLUTION INTRAVENOUS; SUBCUTANEOUS at 22:05

## 2025-07-29 RX ADMIN — ALBUTEROL SULFATE 2.5 MG: 2.5 SOLUTION RESPIRATORY (INHALATION) at 15:49

## 2025-07-29 RX ADMIN — METHYLPREDNISOLONE SODIUM SUCCINATE 125 MG: 125 INJECTION, POWDER, FOR SOLUTION INTRAMUSCULAR; INTRAVENOUS at 15:39

## 2025-07-29 SDOH — SOCIAL STABILITY: SOCIAL INSECURITY: WITHIN THE LAST YEAR, HAVE YOU BEEN AFRAID OF YOUR PARTNER OR EX-PARTNER?: NO

## 2025-07-29 SDOH — ECONOMIC STABILITY: HOUSING INSECURITY: IN THE PAST 12 MONTHS, HOW MANY TIMES HAVE YOU MOVED WHERE YOU WERE LIVING?: 0

## 2025-07-29 SDOH — ECONOMIC STABILITY: HOUSING INSECURITY: AT ANY TIME IN THE PAST 12 MONTHS, WERE YOU HOMELESS OR LIVING IN A SHELTER (INCLUDING NOW)?: NO

## 2025-07-29 SDOH — ECONOMIC STABILITY: FOOD INSECURITY: WITHIN THE PAST 12 MONTHS, THE FOOD YOU BOUGHT JUST DIDN'T LAST AND YOU DIDN'T HAVE MONEY TO GET MORE.: NEVER TRUE

## 2025-07-29 SDOH — ECONOMIC STABILITY: FOOD INSECURITY: HOW HARD IS IT FOR YOU TO PAY FOR THE VERY BASICS LIKE FOOD, HOUSING, MEDICAL CARE, AND HEATING?: NOT HARD AT ALL

## 2025-07-29 SDOH — ECONOMIC STABILITY: HOUSING INSECURITY: IN THE LAST 12 MONTHS, WAS THERE A TIME WHEN YOU WERE NOT ABLE TO PAY THE MORTGAGE OR RENT ON TIME?: NO

## 2025-07-29 SDOH — ECONOMIC STABILITY: TRANSPORTATION INSECURITY: IN THE PAST 12 MONTHS, HAS LACK OF TRANSPORTATION KEPT YOU FROM MEDICAL APPOINTMENTS OR FROM GETTING MEDICATIONS?: NO

## 2025-07-29 SDOH — ECONOMIC STABILITY: INCOME INSECURITY: IN THE PAST 12 MONTHS HAS THE ELECTRIC, GAS, OIL, OR WATER COMPANY THREATENED TO SHUT OFF SERVICES IN YOUR HOME?: NO

## 2025-07-29 SDOH — SOCIAL STABILITY: SOCIAL INSECURITY: ARE YOU OR HAVE YOU BEEN THREATENED OR ABUSED PHYSICALLY, EMOTIONALLY, OR SEXUALLY BY ANYONE?: NO

## 2025-07-29 SDOH — SOCIAL STABILITY: SOCIAL INSECURITY
WITHIN THE LAST YEAR, HAVE YOU BEEN RAPED OR FORCED TO HAVE ANY KIND OF SEXUAL ACTIVITY BY YOUR PARTNER OR EX-PARTNER?: NO

## 2025-07-29 SDOH — SOCIAL STABILITY: SOCIAL INSECURITY: WITHIN THE LAST YEAR, HAVE YOU BEEN HUMILIATED OR EMOTIONALLY ABUSED IN OTHER WAYS BY YOUR PARTNER OR EX-PARTNER?: NO

## 2025-07-29 SDOH — ECONOMIC STABILITY: FOOD INSECURITY: WITHIN THE PAST 12 MONTHS, YOU WORRIED THAT YOUR FOOD WOULD RUN OUT BEFORE YOU GOT THE MONEY TO BUY MORE.: NEVER TRUE

## 2025-07-29 SDOH — SOCIAL STABILITY: SOCIAL INSECURITY: DOES ANYONE TRY TO KEEP YOU FROM HAVING/CONTACTING OTHER FRIENDS OR DOING THINGS OUTSIDE YOUR HOME?: NO

## 2025-07-29 SDOH — SOCIAL STABILITY: SOCIAL INSECURITY: DO YOU FEEL UNSAFE GOING BACK TO THE PLACE WHERE YOU ARE LIVING?: NO

## 2025-07-29 SDOH — SOCIAL STABILITY: SOCIAL INSECURITY: HAS ANYONE EVER THREATENED TO HURT YOUR FAMILY OR YOUR PETS?: NO

## 2025-07-29 SDOH — SOCIAL STABILITY: SOCIAL INSECURITY: HAVE YOU HAD ANY THOUGHTS OF HARMING ANYONE ELSE?: NO

## 2025-07-29 SDOH — SOCIAL STABILITY: SOCIAL INSECURITY: ARE THERE ANY APPARENT SIGNS OF INJURIES/BEHAVIORS THAT COULD BE RELATED TO ABUSE/NEGLECT?: NO

## 2025-07-29 SDOH — SOCIAL STABILITY: SOCIAL INSECURITY: ABUSE: ADULT

## 2025-07-29 SDOH — SOCIAL STABILITY: SOCIAL INSECURITY: DO YOU FEEL ANYONE HAS EXPLOITED OR TAKEN ADVANTAGE OF YOU FINANCIALLY OR OF YOUR PERSONAL PROPERTY?: NO

## 2025-07-29 SDOH — SOCIAL STABILITY: SOCIAL INSECURITY: HAVE YOU HAD THOUGHTS OF HARMING ANYONE ELSE?: NO

## 2025-07-29 ASSESSMENT — COGNITIVE AND FUNCTIONAL STATUS - GENERAL
CLIMB 3 TO 5 STEPS WITH RAILING: A LITTLE
MOBILITY SCORE: 22
PATIENT BASELINE BEDBOUND: NO
DAILY ACTIVITIY SCORE: 24
WALKING IN HOSPITAL ROOM: A LITTLE

## 2025-07-29 ASSESSMENT — PAIN - FUNCTIONAL ASSESSMENT
PAIN_FUNCTIONAL_ASSESSMENT: 0-10
PAIN_FUNCTIONAL_ASSESSMENT: 0-10

## 2025-07-29 ASSESSMENT — LIFESTYLE VARIABLES
HOW OFTEN DO YOU HAVE 6 OR MORE DRINKS ON ONE OCCASION: NEVER
HOW OFTEN DO YOU HAVE A DRINK CONTAINING ALCOHOL: NEVER
TOTAL SCORE: 0
SKIP TO QUESTIONS 9-10: 1
EVER FELT BAD OR GUILTY ABOUT YOUR DRINKING: NO
HAVE YOU EVER FELT YOU SHOULD CUT DOWN ON YOUR DRINKING: NO
EVER HAD A DRINK FIRST THING IN THE MORNING TO STEADY YOUR NERVES TO GET RID OF A HANGOVER: NO
AUDIT-C TOTAL SCORE: 0
HOW MANY STANDARD DRINKS CONTAINING ALCOHOL DO YOU HAVE ON A TYPICAL DAY: PATIENT DOES NOT DRINK
HAVE PEOPLE ANNOYED YOU BY CRITICIZING YOUR DRINKING: NO
AUDIT-C TOTAL SCORE: 0

## 2025-07-29 ASSESSMENT — PATIENT HEALTH QUESTIONNAIRE - PHQ9
2. FEELING DOWN, DEPRESSED OR HOPELESS: SEVERAL DAYS
SUM OF ALL RESPONSES TO PHQ9 QUESTIONS 1 & 2: 2
1. LITTLE INTEREST OR PLEASURE IN DOING THINGS: SEVERAL DAYS

## 2025-07-29 ASSESSMENT — ACTIVITIES OF DAILY LIVING (ADL)
ASSISTIVE_DEVICE: DENTURES UPPER;OXYGEN
HEARING - RIGHT EAR: FUNCTIONAL
TOILETING: INDEPENDENT
GROOMING: INDEPENDENT
LACK_OF_TRANSPORTATION: NO
PATIENT'S MEMORY ADEQUATE TO SAFELY COMPLETE DAILY ACTIVITIES?: YES
FEEDING YOURSELF: INDEPENDENT
WALKS IN HOME: INDEPENDENT
JUDGMENT_ADEQUATE_SAFELY_COMPLETE_DAILY_ACTIVITIES: YES
DRESSING YOURSELF: INDEPENDENT
BATHING: INDEPENDENT
HEARING - LEFT EAR: FUNCTIONAL
ADEQUATE_TO_COMPLETE_ADL: YES

## 2025-07-29 ASSESSMENT — PAIN DESCRIPTION - PAIN TYPE: TYPE: ACUTE PAIN

## 2025-07-29 ASSESSMENT — PAIN DESCRIPTION - LOCATION: LOCATION: CHEST

## 2025-07-29 ASSESSMENT — PAIN DESCRIPTION - DESCRIPTORS: DESCRIPTORS: DISCOMFORT;PRESSURE

## 2025-07-29 ASSESSMENT — PAIN SCALES - GENERAL
PAINLEVEL_OUTOF10: 0 - NO PAIN
PAINLEVEL_OUTOF10: 8
PAINLEVEL_OUTOF10: 3

## 2025-07-29 NOTE — H&P
Medical Group History and Physical      ASSESSMENT & PLAN:     60 y.o. female with a history of COPD, chronic hypoxic respiratory failure on nightly 2L home oxygen, CAD, essential hypertension, type 2 diabetes, GERD, hypothyroidism, anxiety presenting with acute on chronic hypoxic respiratory failure secondary to COPD exacerbation.    #.  COPD exacerbation  #.  Acute on chronic hypoxic respiratory failure  - P/w SOB and persistent cough, currently requiring 3L NC continuously (typically only uses at night and as needed during the day)  - ABG negative for hypercapnia, D-dimer negative  - CT w/o contrast reviewed and shows evidence of chronic bronchitis and tree-in-bud nodularities bilaterally likely related to atypical infection    Plan:  - IV Solu-Medrol 40 mg every 12 hours  - Scheduled DuoNebs every 6 hours, and as needed  - Continue doxycycline given CT findings  - Scheduled Mucinex, Acapella 4 times daily for secretion clearance  - Continue nasal cannula, wean to baseline as able  - Telemetry and continuous pulse oximetry  - Respiratory therapy consult    #.  BELLE  - Scr 1.86 (prior labs reviewed, baseline around 0.8-0.9)  - Likely pre-renal due to poor PO intake in the setting of COPD exacerbation  - Will defer further fluids as she received 2L in the ER  - Encourage PO intake  - Holding home Lisinopril and hydrochlorothiazide  - Trend renal function with daily BMP    #.  Lactic acidosis  - Likely multifactorial and secondary to hypoxia and hypovolemia  - Plan as above for management of acute on chronic hypoxic respiratory failure and BELLE  - Trend lactate  - Does not appear septic, however on doxycycline as above    #.  CAD  - History of multivessel CAD per prior cath report, no history of PTCA  - EKG reviewed and shows NSR without acute ischemic changes, troponin negative x 2  - Continuing home aspirin, atorvastatin    #.  Essential hypertension  - Continuing home metoprolol  - Holding home lisinopril and  hydrochlorothiazide as above in the setting of BELLE    #.  Type 2 diabetes without complication  - Last A1C 6.1% in 09/2024, repeat this admission  - Holding home metformin given lactic acidosis  - SSI, Accu-cheks, hypoglycemic protocol    #.  GERD  #.  Hypothyroidism  #.  Anxiety  - Continuing home medications    VTE PPX: subQ heparin and SCDs      Victor Manuel Cervantes MD    --Of note, this documentation is completed using the Dragon Dictation system (voice recognition software). There may be spelling and/or grammatical errors that were not corrected prior to final submission.--    HISTORY OF PRESENT ILLNESS:   Chief Complaint: Shortness of breath    Lauren Thompson is a 60 y.o. female with a history of COPD, chronic hypoxic respiratory failure on nightly 2L home oxygen, CAD, essential hypertension, type 2 diabetes, GERD, hypothyroidism, anxiety presenting with shortness of breath for the last 3 days.  Patient states that she has been using her rescue inhaler much more frequently.  She has had a persistent cough however has been unable to clear any mucus.  She endorses subjective fever and chills.  She has some chest discomfort related to coughing but denies any avelino chest pain.  Denies any swelling in her legs or orthopnea.     Spoke with the ER provider regarding workup and need for admission.  Patient initially presented tachycardic, tachypneic, hypoxic, afebrile, normotensive.  She was placed on 3 L nasal cannula.  Labs notable for hyperglycemia, hyponatremia, BELLE, lactic acidosis.  ABG shows pH 7.35, PCO2 41, PO2 89, bicarb 22.6.  CT chest showed features of chronic bronchitis as well as subtle tree-in-bud nodularities bilaterally presumably reflecting atypical infection.  Patient received IV Solu-Medrol, IV magnesium, nebulizer treatments, doxycycline, 2 L normal saline, and hydroxyzine in the ER. Patient will require admission for further workup and treatment.     ROS  10 point review of systems negative  except per HPI     PAST HISTORIES:     Past Medical History  See HPI    Surgical History  She has a past surgical history that includes Other surgical history (07/08/2019); Other surgical history (07/08/2019); Other surgical history (07/08/2019); Other surgical history (07/08/2019); Partial hysterectomy (09/25/2023); Bladder surgery (09/25/2023); Cardiac catheterization; Appendectomy; Hysterectomy; Sinus surgery; Tonsillectomy; and [place] tooth extraction.     Social History  She reports that she quit smoking about 2 years ago. Her smoking use included cigarettes. She started smoking about 44 years ago. She has a 41.5 pack-year smoking history. She has never used smokeless tobacco. She reports that she does not currently use alcohol. She reports that she does not use drugs.    Family History  Family History[1]    Allergies:  Patient has no known allergies.      OBJECTIVE:      Last Recorded Vitals  /66 (BP Location: Left arm, Patient Position: Sitting)   Pulse 96   Temp 36.8 °C (98.2 °F) (Temporal)   Resp 18   Wt 76.2 kg (168 lb)   SpO2 97%     Last I/O:  I/O last 3 completed shifts:  In: 50 (0.7 mL/kg) [I.V.:50 (0.7 mL/kg)]  Out: - (0 mL/kg)   Weight: 76.2 kg     Physical Exam   Gen: Appears anxious, appears stated age  HEENT: EOM, MMM  CV: RRR, no murmurs rubs or gallops  Resp: Diffuse wheezing bilaterally, mildly tachypneic when speaking in full sentences, no respiratory distress  Abdomen: soft, NT,+BS  LE: No edema, no deformity  Neuro: A&Ox4, moving all extremities    LABS AND IMAGING:     All pertinent labs and imaging reviewed personally by me.    Relevant Results  Labs Reviewed   CBC WITH AUTO DIFFERENTIAL - Abnormal       Result Value    WBC 8.3      nRBC 0.0      RBC 4.14      Hemoglobin 12.5      Hematocrit 37.7      MCV 91      MCH 30.2      MCHC 33.2      RDW 12.9      Platelets 285      Neutrophils % 89.2      Immature Granulocytes %, Automated 0.7      Lymphocytes % 6.2      Monocytes % 3.7       Eosinophils % 0.0      Basophils % 0.2      Neutrophils Absolute 7.42      Immature Granulocytes Absolute, Automated 0.06      Lymphocytes Absolute 0.52 (*)     Monocytes Absolute 0.31      Eosinophils Absolute 0.00      Basophils Absolute 0.02     COMPREHENSIVE METABOLIC PANEL - Abnormal    Glucose 201 (*)     Sodium 135 (*)     Potassium 4.3      Chloride 99      Bicarbonate 23      Anion Gap 17      Urea Nitrogen 41 (*)     Creatinine 1.86 (*)     eGFR 30 (*)     Calcium 9.2      Albumin 4.4      Alkaline Phosphatase 108      Total Protein 7.4      AST 18      Bilirubin, Total 0.4      ALT 17     LACTATE - Abnormal    Lactate 3.5 (*)     Narrative:     Venipuncture immediately after or during the administration of Metamizole may lead to falsely low results. Testing should be performed immediately prior to Metamizole dosing.   BLOOD GAS ARTERIAL FULL PANEL - Abnormal    POCT pH, Arterial 7.35 (*)     POCT pCO2, Arterial 41      POCT pO2, Arterial 89      POCT SO2, Arterial 98      POCT Oxy Hemoglobin, Arterial 96.4      POCT Hematocrit Calculated, Arterial 38.0      POCT Sodium, Arterial 134 (*)     POCT Potassium, Arterial 4.4      POCT Chloride, Arterial 100      POCT Ionized Calcium, Arterial 1.20      POCT Glucose, Arterial 174 (*)     POCT Lactate, Arterial 3.0 (*)     POCT Base Excess, Arterial -2.9 (*)     POCT HCO3 Calculated, Arterial 22.6      POCT Hemoglobin, Arterial 12.5      POCT Anion Gap, Arterial 16      Patient Temperature        FiO2 32      Site of Arterial Puncture LB      Clifford's Test NA     LACTATE - Abnormal    Lactate 2.7 (*)     Narrative:     Venipuncture immediately after or during the administration of Metamizole may lead to falsely low results. Testing should be performed immediately prior to Metamizole dosing.   BLOOD GAS LACTIC ACID, VENOUS - Abnormal    POCT Lactate, Venous 2.7 (*)    MAGNESIUM - Normal    Magnesium 1.98     B-TYPE NATRIURETIC PEPTIDE - Normal    BNP 19       Narrative:        <100 pg/mL - Heart failure unlikely  100-299 pg/mL - Intermediate probability of acute heart                  failure exacerbation. Correlate with clinical                  context and patient history.    >=300 pg/mL - Heart Failure likely. Correlate with clinical                  context and patient history.    BNP testing is performed using different testing methodology at [facility] than at other [facility]. Direct result comparisons should only be made within the same method.      PROTIME-INR - Normal    Protime 10.4      INR 0.9     D-DIMER, VTE EXCLUSION - Normal    D-Dimer, Quantitative VTE Exclusion 286      Narrative:     The VTE Exclusion D-Dimer assay is reported in ng/mL Fibrinogen Equivalent Units (FEU).    Per 's instructions for use, a value of less than 500 ng/mL (FEU) may help to exclude DVT or PE in outpatients when the assay is used with a clinical pretest probability assessment.(AE must utilize and document eCalc 'Wells Score Deep Vein Thrombosis Risk' for DVT exclusion only. Emergency Department should utilize  Guidelines for Emergency Department Use of the VTE Exclusion D-Dimer and Clinical Pretest probability assessment model for DVT or PE exclusion.)   SARS-COV-2 PCR - Normal    Coronavirus 2019, PCR Not Detected      Narrative:     This assay is an FDA-cleared, in vitro diagnostic nucleic acid amplification test for the qualitative detection and differentiation of SARS CoV-2 from nasopharyngeal specimens collected from individuals with signs and symptoms of respiratory tract infections, and has been validated for use at [facility]. Negative results do not preclude COVID-19 infections and should not be used as the sole basis for diagnosis, treatment, or other management decisions. Testing for SARS CoV-2 is recommended only for patients who meet current clinical and/or epidemiological criteria defined by federal,  state, or local public health directives.   SERIAL TROPONIN-INITIAL - Normal    Troponin I, High Sensitivity 3      Narrative:     Less than 99th percentile of normal range cutoff-  Female and children under 18 years old <14 ng/L; Male <21 ng/L: Negative  Repeat testing should be performed if clinically indicated.     Female and children under 18 years old 14-50 ng/L; Male 21-50 ng/L:  Consistent with possible cardiac damage and possible increased clinical   risk. Serial measurements may help to assess extent of myocardial damage.     >50 ng/L: Consistent with cardiac damage, increased clinical risk and  myocardial infarction. Serial measurements may help assess extent of   myocardial damage.      NOTE: Children less than 1 year old may have higher baseline troponin   levels and results should be interpreted in conjunction with the overall   clinical context.     NOTE: Troponin I testing is performed using a different   testing methodology at [facility] than at other   [facility]. Direct result comparisons should only   be made within the same method.   SERIAL TROPONIN, 1 HOUR - Normal    Troponin I, High Sensitivity 3      Narrative:     Less than 99th percentile of normal range cutoff-  Female and children under 18 years old <14 ng/L; Male <21 ng/L: Negative  Repeat testing should be performed if clinically indicated.     Female and children under 18 years old 14-50 ng/L; Male 21-50 ng/L:  Consistent with possible cardiac damage and possible increased clinical   risk. Serial measurements may help to assess extent of myocardial damage.     >50 ng/L: Consistent with cardiac damage, increased clinical risk and  myocardial infarction. Serial measurements may help assess extent of   myocardial damage.      NOTE: Children less than 1 year old may have higher baseline troponin   levels and results should be interpreted in conjunction with the overall   clinical context.     NOTE: Troponin I testing is  performed using a different   testing methodology at [facility] than at other   [facility]. Direct result comparisons should only   be made within the same method.   TROPONIN SERIES- (INITIAL, 1 HR)    Narrative:     The following orders were created for panel order Troponin I Series, High Sensitivity (0, 1 HR).  Procedure                               Abnormality         Status                     ---------                               -----------         ------                     Troponin I, High Sensiti...[[id number]]  Normal              Final result               Troponin, High Sensitivi...[[id number]]  Normal              Final result                 Please view results for these tests on the individual orders.     CT chest wo IV contrast   Final Result   1.  Bronchial thickening with features of smoking related airway   disease and chronic bronchitis with some subtle tree-in-bud nodules   seen scattered throughout the lungs presumably reflecting atypical   infection continued annual surveillance as advised per prior CT   screening exam. No growing pulmonary nodules             Signed by: Joshua Rutherford 7/29/2025 5:32 PM   Dictation workstation:   [id number]      XR chest 1 view   Final Result   No evidence of acute intrathoracic abnormality.        Signed by: Albert Caicedo 7/29/2025 4:04 PM   Dictation workstation:   [id number]                 [1]   Family History  Problem Relation Name Age of Onset    Other (Cardiac disorder) Mother Justyna Stottlemire     Other (CVA) Mother Justyna Stottlemire     Hypertension Mother Justyna Stottlemire     Other (Diabetes mellitus) Mother Justyna Stottlemire     Heart disease Mother Justyna Stottlemire     Kidney disease Mother Justyna Stottlemire     Stroke Mother Justyna Stottlemire     Alcohol abuse Mother Justyna Stottlemire     COPD Mother Justyna Stottlemire     Diabetes Mother Justyna Stottlemire     Cancer Father Oswaldo Adamson     Heart disease Father Oswaldo Adamson      Hypertension Father Oswaldo Adamson     Lung disease Father Oswaldo Adamson     Stroke Father Oswaldo Adamson     Arthritis Father Oswaldo Adamson     COPD Father Oswaldo Mariisley     Diabetes Father Oswaldo Mariisley     Cancer Sister Sunshine Traverse     Depression Sister Sunshine Claudia     Ovarian cancer Sister Sunshine Claudia     Depression Sister Jacquie Carl     Diabetes type II Sister Jacquie Carl     Hypertension Sister Jacquie Carl     Obesity Sister Jacquie Carl     Drug abuse Sister Jacquie Carl     Depression Sister Priya Almonte     Diabetes type I Sister Lisa De Leon     Hypertension Sister Lisa De Leon     Thyroid disease Sister Lisa De Leon     Accidental death Sister Lisa De Leon     Diabetes Sister Lisa De Leon     Drug abuse Sister Lisa De Leon     Heart attack Sister Sweetie Saenz     Heart disease Sister Sweetie Saenz     Hypertension Sister Sweetie Saenz     Obesity Sister Sweetie Saenz     COPD Sister Sweetie Saenz     Diabetes Sister Sweetie Saenz     Hypertension Sister Miley Heuy     Stroke Sister Miley Heuy     Heart disease Maternal Grandmother Sweetie Clay     Kidney disease Maternal Grandmother Sweetie Clay     Diabetes Maternal Grandmother Sweetie Clay     Cancer Paternal Grandmother Cheyanne Arreaga     Breast cancer Paternal Grandmother Cheyanne Arreaga

## 2025-07-29 NOTE — ED PROVIDER NOTES
60-year-old female presents emergency department for chief complaint of shortness of breath.  Patient reports she has been having symptoms for the past 3 days.  Admits to dry cough.  She also reports subjective fevers.  Patient does report chest tightness discomfort.  No abdominal pain, nausea, vomiting, dysuria, diarrhea, constipation, black or bloody stools. Patient does report this feels consistent with her usual COPD.  She does admit to headache that is consistent with headache she has had in the past.  She has been using her home aerosols as well as a Dosepak of steroids without relief.  Patient is on 2 L nasal cannula oxygen at night and as needed.  She reports she has been needing this more frequently.  Chart review shows history of previous tobacco use, CAD, depression, hypertension, hyperlipidemia, elevated BMI, oxygen dependence, and GERD.  Patient reportedly has history of alcohol dependence, but denies any current alcohol use.  No report of illicit drug use.  Patient is not on any anticoagulants.      History provided by:  Patient   used: No           ------------------------------------------------------------------------------------------------------------------------------------------    VS: As documented in the triage note and EMR flowsheet from this visit were reviewed.    Review of Systems  Constitutional: Reports subjective fevers and malaise  Eyes: no redness, discharge, pain  HENT: no sore throat, nose bleeds, admits to congestion  Cardiovascular: Reports chest pain no leg edema, palpitations  Respiratory: Shortness of breath, cough, and wheezing  GI: no nausea, diarrhea, pain, vomiting, constipation, BRBPR, melena  : no dysuria, frequency, hematuria  Musculoskeletal: no neck pain, stiffness,  no joint deformity, swelling  Skin: no rash, erythema, wounds  Neurological: no headache, dizziness, lightheadedness, weakness, numbness, or tingling  Psychiatric: no suicidal  thoughts, confusion, agitation  Metabolic: no polyuria or polydipsia  Hematologic: no increased bleeding or bruising  Immunology: No immunocompromise state    [facility]  Nursing notes reviewed and confirmed by me.  Chart review performed including medications, allergies, and medical, surgical, and family history  Visit Vitals  /52 (BP Location: Left arm, Patient Position: Sitting)   Pulse 96   Temp 36.8 °C (98.2 °F) (Temporal)   Resp 19     Physical Exam  Vitals and nursing note reviewed.   Constitutional:       Appearance: Normal appearance. She is not ill-appearing.   HENT:      Head: Normocephalic and atraumatic.      Right Ear: External ear normal.      Left Ear: External ear normal.      Nose: Nose normal. No congestion or rhinorrhea.      Mouth/Throat:      Mouth: Mucous membranes are moist.      Pharynx: No oropharyngeal exudate or posterior oropharyngeal erythema.     Eyes:      Extraocular Movements: Extraocular movements intact.      Conjunctiva/sclera: Conjunctivae normal.      Pupils: Pupils are equal, round, and reactive to light.       Cardiovascular:      Rate and Rhythm: Regular rhythm. Tachycardia present.      Pulses: Normal pulses.      Heart sounds: Normal heart sounds.   Pulmonary:      Effort: Respiratory distress present.      Breath sounds: No stridor. Wheezing present. No rhonchi or rales.      Comments: Patient is in mild to moderate respiratory distress.  Patient is appreciated to be using accessory muscles and tripoding.  Inspiratory and expiratory wheezing bilaterally diminished in the bases.  Abdominal:      General: There is no distension.      Palpations: Abdomen is soft.      Tenderness: There is no abdominal tenderness. There is no guarding or rebound.     Musculoskeletal:         General: No swelling or deformity. Normal range of motion.      Cervical back: Normal range of motion and neck supple. No rigidity.      Right lower leg: No edema.      Left lower leg: No edema.       Comments: No calf tenderness      Skin:     General: Skin is warm and dry.      Capillary Refill: Capillary refill takes less than 2 seconds.      Coloration: Skin is not jaundiced.      Findings: No rash.     Neurological:      General: No focal deficit present.      Mental Status: She is alert and oriented to person, place, and time.      Sensory: No sensory deficit.      Motor: No weakness.     Psychiatric:         Mood and Affect: Mood normal.         Behavior: Behavior normal.        Medical History[1]   Surgical History[2]   Social History[3]   ------------------------------------------------------------------------------------------------------------------------------------------  CT chest wo IV contrast   Final Result   1.  Bronchial thickening with features of smoking related airway   disease and chronic bronchitis with some subtle tree-in-bud nodules   seen scattered throughout the lungs presumably reflecting atypical   infection continued annual surveillance as advised per prior CT   screening exam. No growing pulmonary nodules             Signed by: Joshua Rutherford 7/29/2025 5:32 PM   Dictation workstation:   [id number]      XR chest 1 view   Final Result   No evidence of acute intrathoracic abnormality.        Signed by: Albert Caicedo 7/29/2025 4:04 PM   Dictation workstation:   [id number]         Labs Reviewed   CBC WITH AUTO DIFFERENTIAL - Abnormal       Result Value    WBC 8.3      nRBC 0.0      RBC 4.14      Hemoglobin 12.5      Hematocrit 37.7      MCV 91      MCH 30.2      MCHC 33.2      RDW 12.9      Platelets 285      Neutrophils % 89.2      Immature Granulocytes %, Automated 0.7      Lymphocytes % 6.2      Monocytes % 3.7      Eosinophils % 0.0      Basophils % 0.2      Neutrophils Absolute 7.42      Immature Granulocytes Absolute, Automated 0.06      Lymphocytes Absolute 0.52 (*)     Monocytes Absolute 0.31      Eosinophils Absolute 0.00      Basophils Absolute 0.02     COMPREHENSIVE METABOLIC  PANEL - Abnormal    Glucose 201 (*)     Sodium 135 (*)     Potassium 4.3      Chloride 99      Bicarbonate 23      Anion Gap 17      Urea Nitrogen 41 (*)     Creatinine 1.86 (*)     eGFR 30 (*)     Calcium 9.2      Albumin 4.4      Alkaline Phosphatase 108      Total Protein 7.4      AST 18      Bilirubin, Total 0.4      ALT 17     LACTATE - Abnormal    Lactate 3.5 (*)     Narrative:     Venipuncture immediately after or during the administration of Metamizole may lead to falsely low results. Testing should be performed immediately prior to Metamizole dosing.   BLOOD GAS ARTERIAL FULL PANEL - Abnormal    POCT pH, Arterial 7.35 (*)     POCT pCO2, Arterial 41      POCT pO2, Arterial 89      POCT SO2, Arterial 98      POCT Oxy Hemoglobin, Arterial 96.4      POCT Hematocrit Calculated, Arterial 38.0      POCT Sodium, Arterial 134 (*)     POCT Potassium, Arterial 4.4      POCT Chloride, Arterial 100      POCT Ionized Calcium, Arterial 1.20      POCT Glucose, Arterial 174 (*)     POCT Lactate, Arterial 3.0 (*)     POCT Base Excess, Arterial -2.9 (*)     POCT HCO3 Calculated, Arterial 22.6      POCT Hemoglobin, Arterial 12.5      POCT Anion Gap, Arterial 16      Patient Temperature        FiO2 32      Site of Arterial Puncture LB      Clifford's Test NA     LACTATE - Abnormal    Lactate 2.7 (*)     Narrative:     Venipuncture immediately after or during the administration of Metamizole may lead to falsely low results. Testing should be performed immediately prior to Metamizole dosing.   BLOOD GAS LACTIC ACID, VENOUS - Abnormal    POCT Lactate, Venous 2.7 (*)    MAGNESIUM - Normal    Magnesium 1.98     B-TYPE NATRIURETIC PEPTIDE - Normal    BNP 19      Narrative:        <100 pg/mL - Heart failure unlikely  100-299 pg/mL - Intermediate probability of acute heart                  failure exacerbation. Correlate with clinical                  context and patient history.    >=300 pg/mL - Heart Failure likely. Correlate with  clinical                  context and patient history.    BNP testing is performed using different testing methodology at [facility] than at other [facility]. Direct result comparisons should only be made within the same method.      PROTIME-INR - Normal    Protime 10.4      INR 0.9     D-DIMER, VTE EXCLUSION - Normal    D-Dimer, Quantitative VTE Exclusion 286      Narrative:     The VTE Exclusion D-Dimer assay is reported in ng/mL Fibrinogen Equivalent Units (FEU).    Per 's instructions for use, a value of less than 500 ng/mL (FEU) may help to exclude DVT or PE in outpatients when the assay is used with a clinical pretest probability assessment.([facility] must utilize and document eCalc 'Wells Score Deep Vein Thrombosis Risk' for DVT exclusion only. Emergency Department should utilize  Guidelines for Emergency Department Use of the VTE Exclusion D-Dimer and Clinical Pretest probability assessment model for DVT or PE exclusion.)   SARS-COV-2 PCR - Normal    Coronavirus 2019, PCR Not Detected      Narrative:     This assay is an FDA-cleared, in vitro diagnostic nucleic acid amplification test for the qualitative detection and differentiation of SARS CoV-2 from nasopharyngeal specimens collected from individuals with signs and symptoms of respiratory tract infections, and has been validated for use at [facility]. Negative results do not preclude COVID-19 infections and should not be used as the sole basis for diagnosis, treatment, or other management decisions. Testing for SARS CoV-2 is recommended only for patients who meet current clinical and/or epidemiological criteria defined by federal, state, or local public health directives.   SERIAL TROPONIN-INITIAL - Normal    Troponin I, High Sensitivity 3      Narrative:     Less than 99th percentile of normal range cutoff-  Female and children under 18 years old <14 ng/L; Male <21 ng/L: Negative  Repeat testing should  be performed if clinically indicated.     Female and children under 18 years old 14-50 ng/L; Male 21-50 ng/L:  Consistent with possible cardiac damage and possible increased clinical   risk. Serial measurements may help to assess extent of myocardial damage.     >50 ng/L: Consistent with cardiac damage, increased clinical risk and  myocardial infarction. Serial measurements may help assess extent of   myocardial damage.      NOTE: Children less than 1 year old may have higher baseline troponin   levels and results should be interpreted in conjunction with the overall   clinical context.     NOTE: Troponin I testing is performed using a different   testing methodology at [facility] than at other   [facility]. Direct result comparisons should only   be made within the same method.   SERIAL TROPONIN, 1 HOUR - Normal    Troponin I, High Sensitivity 3      Narrative:     Less than 99th percentile of normal range cutoff-  Female and children under 18 years old <14 ng/L; Male <21 ng/L: Negative  Repeat testing should be performed if clinically indicated.     Female and children under 18 years old 14-50 ng/L; Male 21-50 ng/L:  Consistent with possible cardiac damage and possible increased clinical   risk. Serial measurements may help to assess extent of myocardial damage.     >50 ng/L: Consistent with cardiac damage, increased clinical risk and  myocardial infarction. Serial measurements may help assess extent of   myocardial damage.      NOTE: Children less than 1 year old may have higher baseline troponin   levels and results should be interpreted in conjunction with the overall   clinical context.     NOTE: Troponin I testing is performed using a different   testing methodology at [facility] than at other   [facility]. Direct result comparisons should only   be made within the same method.   TROPONIN SERIES- (INITIAL, 1 HR)    Narrative:     The following orders were created for  panel order Troponin I Series, High Sensitivity (0, 1 HR).  Procedure                               Abnormality         Status                     ---------                               -----------         ------                     Troponin I, High Sensiti...[[id number]]  Normal              Final result               Troponin, High Sensitivi...[[id number]]  Normal              Final result                 Please view results for these tests on the individual orders.        Medical Decision Making  EKG interpreted by ED physician: Normal sinus rhythm rate of 97.  WA, QRS, QTc intervals all within normal limits.  No significant ST elevations or depressions.  Q waves present in septal leads may represent previous infarct.  Good R wave progression.  Normal axis.    Repeat EKG interpreted by ED physician: Normal sinus rhythm rate 100.  WA, QRS, QTc intervals all within normal limits.  No significant ST elevations or depressions.  Q waves present in septal leads may represent previous infarct.  Good R wave progression.  Normal axis.    60-year-old female presents emergency department chief complaint of COPD exacerbation and shortness of breath.  She also admits to productive cough and fevers.  Given complaints a thorough workup is obtained.  Blood gas does not show any significant hypercapnic or hypoxic respiratory failure though patient is requiring 3 L nasal cannula oxygen which is a change from her baseline of 2 L as needed.  CBC does not show significant leukocytosis or anemia.  Patient does not have findings of sepsis.  D-dimer is negative making blood clots unlikely.  CMP shows findings of dehydration with acute kidney injury.  Patient given fluid bolus.  Initial blood pressures were somewhat low, but patient was fluid responsive.  COVID testing negative.  BNP within normal range do not suspect decompensated heart failure.  Cardiac enzymes x 2 and EKG showed no acute ACS.  Chest x-ray does not show any acute  cardiopulmonary process such as pneumonia, pleural effusion, or pulmonary edema.  However given patient's reported productive cough and shortness of breath CT imaging is obtained for further evaluation.  It further supports findings of COPD, but also shows findings concerning for atypical pneumonia.  Patient treated with doxycycline.  Patient also treated for COPD exacerbation with multiple aerosols, Solu-Medrol, and IV magnesium.  On reevaluation, patient continues to be wheezy, but lung sounds are improved and air movement.  Given persistent symptoms and increased need of oxygen recommend admission for further evaluation and treatment.  Patient and family agreeable with plan.  Case was discussed with hospitalist on-call.       Diagnoses as of 07/29/25 2122   COPD exacerbation (Multi)   Acute on chronic respiratory failure with hypoxia      1. COPD exacerbation (Multi)        2. Acute on chronic respiratory failure with hypoxia           Critical Care    Performed by: Eliud Rushing DO  Authorized by: Eliud Rushing DO    Critical care provider statement:     Critical care time (minutes):  37    Critical care time was exclusive of:  Separately billable procedures and treating other patients    Critical care was necessary to treat or prevent imminent or life-threatening deterioration of the following conditions:  Respiratory failure    Critical care was time spent personally by me on the following activities:  Development of treatment plan with patient or surrogate, evaluation of patient's response to treatment, examination of patient, re-evaluation of patient's condition, pulse oximetry, ordering and review of radiographic studies, ordering and performing treatments and interventions and ordering and review of laboratory studies    Care discussed with: admitting provider         This note was dictated using dragon software and may contain errors related to dictation interpretation errors.          [1]   Past  Medical History:  Diagnosis Date    Anxiety     Arthritis     COPD (chronic obstructive pulmonary disease) (Multi)     COPD (chronic obstructive pulmonary disease) with acute bronchitis (Multi) 2024    Coronary artery disease     Depression 2023    Diabetes mellitus (Multi)     Disease of thyroid gland     Headache     Heart disease     Hypertension    [2]   Past Surgical History:  Procedure Laterality Date    APPENDECTOMY      BLADDER SURGERY  2023    CARDIAC CATHETERIZATION      HYSTERECTOMY      OTHER SURGICAL HISTORY  2019    Hysterectomy    OTHER SURGICAL HISTORY  2019    Cardiac catheterization x 3    OTHER SURGICAL HISTORY  2019    Appendectomy    OTHER SURGICAL HISTORY  2019    Tonsillectomy with adenoidectomy    PARTIAL HYSTERECTOMY  2023    SINUS SURGERY      TONSILLECTOMY      WISDOM TOOTH EXTRACTION     [3]   Social History  Socioeconomic History    Marital status: Legally    Tobacco Use    Smoking status: Former     Current packs/day: 0.00     Average packs/day: 1 pack/day for 41.5 years (41.5 ttl pk-yrs)     Types: Cigarettes     Start date: 1981     Quit date: 2022     Years since quittin.6    Smokeless tobacco: Never   Substance and Sexual Activity    Alcohol use: Not Currently     Comment: Quit drinking 6 months ago    Drug use: Never    Sexual activity: Yes     Partners: Male     Birth control/protection: Female Sterilization     Social Drivers of Health     Financial Resource Strain: Low Risk  (3/5/2024)    Overall Financial Resource Strain (CARDIA)     Difficulty of Paying Living Expenses: Not hard at all   Food Insecurity: No Food Insecurity (3/5/2024)    Hunger Vital Sign     Worried About Running Out of Food in the Last Year: Never true     Ran Out of Food in the Last Year: Never true   Transportation Needs: No Transportation Needs (3/5/2024)    PRAPARE - Transportation     Lack of Transportation (Medical): No     Lack of  Transportation (Non-Medical): No   Recent Concern: Transportation Needs - Unmet Transportation Needs (3/3/2024)    PRAPARE - Transportation     Lack of Transportation (Medical): Yes     Lack of Transportation (Non-Medical): Yes   Physical Activity: Insufficiently Active (3/5/2024)    Exercise Vital Sign     Days of Exercise per Week: 2 days     Minutes of Exercise per Session: 10 min   Stress: No Stress Concern Present (3/5/2024)    [employer] [place] [employer] - Occupational Stress Questionnaire     Feeling of Stress : Not at all   Social Connections: Moderately Isolated (3/5/2024)    Social Connection and Isolation Panel     Frequency of Communication with Friends and Family: Three times a week     Frequency of Social Gatherings with Friends and Family: Once a week     Attends Yarsanism Services: More than 4 times per year     Active Member of Clubs or Organizations: No     Attends Club or Organization Meetings: Never     Marital Status:    Intimate Partner Violence: Not At Risk (3/5/2024)    Humiliation, Afraid, Rape, and Kick questionnaire     Fear of Current or Ex-Partner: No     Emotionally Abused: No     Physically Abused: No     Sexually Abused: No   Housing Stability: Low Risk  (3/5/2024)    Housing Stability Vital Sign     Unable to Pay for Housing in the Last Year: No     Number of Places Lived in the Last Year: 1     Unstable Housing in the Last Year: No   Recent Concern: Housing Stability - High Risk (3/3/2024)    Housing Stability Vital Sign     Unable to Pay for Housing in the Last Year: Yes     Number of Places Lived in the Last Year: 1     Unstable Housing in the Last Year: Yes        Eliud Rushing,   07/29/25 2129

## 2025-07-30 LAB
ANION GAP SERPL CALC-SCNC: 10 MMOL/L (ref 10–20)
ATRIAL RATE: 100 BPM
BASOPHILS # BLD AUTO: 0.01 X10*3/UL (ref 0–0.1)
BASOPHILS NFR BLD AUTO: 0.1 %
BUN SERPL-MCNC: 33 MG/DL (ref 6–23)
CALCIUM SERPL-MCNC: 8.9 MG/DL (ref 8.6–10.3)
CHLORIDE SERPL-SCNC: 106 MMOL/L (ref 98–107)
CO2 SERPL-SCNC: 25 MMOL/L (ref 21–32)
CREAT SERPL-MCNC: 1.16 MG/DL (ref 0.5–1.05)
EGFRCR SERPLBLD CKD-EPI 2021: 53 ML/MIN/1.73M*2
EOSINOPHIL # BLD AUTO: 0 X10*3/UL (ref 0–0.7)
EOSINOPHIL NFR BLD AUTO: 0 %
ERYTHROCYTE [DISTWIDTH] IN BLOOD BY AUTOMATED COUNT: 12.9 % (ref 11.5–14.5)
EST. AVERAGE GLUCOSE BLD GHB EST-MCNC: 117 MG/DL
GLUCOSE BLD MANUAL STRIP-MCNC: 122 MG/DL (ref 74–99)
GLUCOSE BLD MANUAL STRIP-MCNC: 142 MG/DL (ref 74–99)
GLUCOSE BLD MANUAL STRIP-MCNC: 189 MG/DL (ref 74–99)
GLUCOSE BLD MANUAL STRIP-MCNC: 205 MG/DL (ref 74–99)
GLUCOSE SERPL-MCNC: 135 MG/DL (ref 74–99)
HBA1C MFR BLD: 5.7 % (ref ?–5.7)
HCT VFR BLD AUTO: 32.1 % (ref 36–46)
HGB BLD-MCNC: 10.8 G/DL (ref 12–16)
HOLD SPECIMEN: NORMAL
IMM GRANULOCYTES # BLD AUTO: 0.13 X10*3/UL (ref 0–0.7)
IMM GRANULOCYTES NFR BLD AUTO: 1.4 % (ref 0–0.9)
LACTATE SERPL-SCNC: 1 MMOL/L (ref 0.4–2)
LYMPHOCYTES # BLD AUTO: 0.8 X10*3/UL (ref 1.2–4.8)
LYMPHOCYTES NFR BLD AUTO: 8.7 %
MCH RBC QN AUTO: 30.5 PG (ref 26–34)
MCHC RBC AUTO-ENTMCNC: 33.6 G/DL (ref 32–36)
MCV RBC AUTO: 91 FL (ref 80–100)
MONOCYTES # BLD AUTO: 0.79 X10*3/UL (ref 0.1–1)
MONOCYTES NFR BLD AUTO: 8.6 %
NEUTROPHILS # BLD AUTO: 7.45 X10*3/UL (ref 1.2–7.7)
NEUTROPHILS NFR BLD AUTO: 81.2 %
NRBC BLD-RTO: 0 /100 WBCS (ref 0–0)
P AXIS: 84 DEGREES
P OFFSET: 183 MS
P ONSET: 126 MS
PLATELET # BLD AUTO: 246 X10*3/UL (ref 150–450)
POTASSIUM SERPL-SCNC: 4.3 MMOL/L (ref 3.5–5.3)
PR INTERVAL: 190 MS
Q ONSET: 221 MS
QRS COUNT: 17 BEATS
QRS DURATION: 82 MS
QT INTERVAL: 354 MS
QTC CALCULATION(BAZETT): 456 MS
QTC FREDERICIA: 420 MS
R AXIS: 75 DEGREES
RBC # BLD AUTO: 3.54 X10*6/UL (ref 4–5.2)
SODIUM SERPL-SCNC: 137 MMOL/L (ref 136–145)
T AXIS: 80 DEGREES
T OFFSET: 398 MS
VENTRICULAR RATE: 100 BPM
WBC # BLD AUTO: 9.2 X10*3/UL (ref 4.4–11.3)

## 2025-07-30 PROCEDURE — 2500000004 HC RX 250 GENERAL PHARMACY W/ HCPCS (ALT 636 FOR OP/ED): Performed by: STUDENT IN AN ORGANIZED HEALTH CARE EDUCATION/TRAINING PROGRAM

## 2025-07-30 PROCEDURE — 2500000002 HC RX 250 W HCPCS SELF ADMINISTERED DRUGS (ALT 637 FOR MEDICARE OP, ALT 636 FOR OP/ED): Performed by: STUDENT IN AN ORGANIZED HEALTH CARE EDUCATION/TRAINING PROGRAM

## 2025-07-30 PROCEDURE — 2500000001 HC RX 250 WO HCPCS SELF ADMINISTERED DRUGS (ALT 637 FOR MEDICARE OP)

## 2025-07-30 PROCEDURE — 85025 COMPLETE CBC W/AUTO DIFF WBC: CPT | Performed by: STUDENT IN AN ORGANIZED HEALTH CARE EDUCATION/TRAINING PROGRAM

## 2025-07-30 PROCEDURE — 94640 AIRWAY INHALATION TREATMENT: CPT

## 2025-07-30 PROCEDURE — 80048 BASIC METABOLIC PNL TOTAL CA: CPT | Performed by: STUDENT IN AN ORGANIZED HEALTH CARE EDUCATION/TRAINING PROGRAM

## 2025-07-30 PROCEDURE — 83605 ASSAY OF LACTIC ACID: CPT | Performed by: STUDENT IN AN ORGANIZED HEALTH CARE EDUCATION/TRAINING PROGRAM

## 2025-07-30 PROCEDURE — 82947 ASSAY GLUCOSE BLOOD QUANT: CPT

## 2025-07-30 PROCEDURE — 1200000002 HC GENERAL ROOM WITH TELEMETRY DAILY

## 2025-07-30 PROCEDURE — 99232 SBSQ HOSP IP/OBS MODERATE 35: CPT

## 2025-07-30 PROCEDURE — 2500000001 HC RX 250 WO HCPCS SELF ADMINISTERED DRUGS (ALT 637 FOR MEDICARE OP): Performed by: STUDENT IN AN ORGANIZED HEALTH CARE EDUCATION/TRAINING PROGRAM

## 2025-07-30 PROCEDURE — 36415 COLL VENOUS BLD VENIPUNCTURE: CPT | Performed by: STUDENT IN AN ORGANIZED HEALTH CARE EDUCATION/TRAINING PROGRAM

## 2025-07-30 RX ORDER — CODEINE PHOSPHATE AND GUAIFENESIN 10; 100 MG/5ML; MG/5ML
5 SOLUTION ORAL EVERY 6 HOURS PRN
Status: COMPLETED | OUTPATIENT
Start: 2025-07-30 | End: 2025-07-31

## 2025-07-30 RX ORDER — DEXTROMETHORPHAN POLISTIREX 30 MG/5ML
30 SUSPENSION ORAL EVERY 12 HOURS SCHEDULED
Status: DISPENSED | OUTPATIENT
Start: 2025-07-30

## 2025-07-30 RX ADMIN — DOXYCYCLINE HYCLATE 100 MG: 100 TABLET, FILM COATED ORAL at 20:10

## 2025-07-30 RX ADMIN — METHYLPREDNISOLONE SODIUM SUCCINATE 40 MG: 40 INJECTION, POWDER, FOR SOLUTION INTRAMUSCULAR; INTRAVENOUS at 17:12

## 2025-07-30 RX ADMIN — PANTOPRAZOLE SODIUM 40 MG: 40 TABLET, DELAYED RELEASE ORAL at 06:05

## 2025-07-30 RX ADMIN — BENZONATATE 100 MG: 100 CAPSULE ORAL at 09:30

## 2025-07-30 RX ADMIN — SERTRALINE HYDROCHLORIDE 100 MG: 50 TABLET, FILM COATED ORAL at 09:30

## 2025-07-30 RX ADMIN — LEVOTHYROXINE SODIUM 88 MCG: 0.09 TABLET ORAL at 06:05

## 2025-07-30 RX ADMIN — Medication: at 15:00

## 2025-07-30 RX ADMIN — IPRATROPIUM BROMIDE AND ALBUTEROL SULFATE 3 ML: 2.5; .5 SOLUTION RESPIRATORY (INHALATION) at 19:36

## 2025-07-30 RX ADMIN — HEPARIN SODIUM 5000 UNITS: 5000 INJECTION INTRAVENOUS; SUBCUTANEOUS at 06:05

## 2025-07-30 RX ADMIN — DOXYCYCLINE HYCLATE 100 MG: 100 TABLET, FILM COATED ORAL at 09:30

## 2025-07-30 RX ADMIN — RANOLAZINE 500 MG: 500 TABLET, FILM COATED, EXTENDED RELEASE ORAL at 20:10

## 2025-07-30 RX ADMIN — INSULIN LISPRO 2 UNITS: 100 INJECTION, SOLUTION INTRAVENOUS; SUBCUTANEOUS at 11:25

## 2025-07-30 RX ADMIN — IPRATROPIUM BROMIDE AND ALBUTEROL SULFATE 3 ML: 2.5; .5 SOLUTION RESPIRATORY (INHALATION) at 00:17

## 2025-07-30 RX ADMIN — GUAIFENESIN 600 MG: 600 TABLET, EXTENDED RELEASE ORAL at 09:30

## 2025-07-30 RX ADMIN — GUAIFENESIN AND CODEINE PHOSPHATE 5 ML: 100; 10 SOLUTION ORAL at 18:46

## 2025-07-30 RX ADMIN — HEPARIN SODIUM 5000 UNITS: 5000 INJECTION INTRAVENOUS; SUBCUTANEOUS at 13:53

## 2025-07-30 RX ADMIN — IPRATROPIUM BROMIDE AND ALBUTEROL SULFATE 3 ML: .5; 3 SOLUTION RESPIRATORY (INHALATION) at 10:48

## 2025-07-30 RX ADMIN — HEPARIN SODIUM 5000 UNITS: 5000 INJECTION INTRAVENOUS; SUBCUTANEOUS at 21:00

## 2025-07-30 RX ADMIN — IPRATROPIUM BROMIDE AND ALBUTEROL SULFATE 3 ML: 2.5; .5 SOLUTION RESPIRATORY (INHALATION) at 12:19

## 2025-07-30 RX ADMIN — MAGNESIUM OXIDE 400 MG (241.3 MG MAGNESIUM) TABLET 1 TABLET: TABLET at 09:30

## 2025-07-30 RX ADMIN — GUAIFENESIN 600 MG: 600 TABLET, EXTENDED RELEASE ORAL at 20:10

## 2025-07-30 RX ADMIN — IPRATROPIUM BROMIDE AND ALBUTEROL SULFATE 3 ML: 2.5; .5 SOLUTION RESPIRATORY (INHALATION) at 07:16

## 2025-07-30 RX ADMIN — DEXTROMETHORPHAN POLISTIREX 30 MG: 30 SUSPENSION ORAL at 20:10

## 2025-07-30 RX ADMIN — INSULIN LISPRO 1 UNITS: 100 INJECTION, SOLUTION INTRAVENOUS; SUBCUTANEOUS at 20:53

## 2025-07-30 RX ADMIN — POLYETHYLENE GLYCOL 3350 17 G: 17 POWDER, FOR SOLUTION ORAL at 09:30

## 2025-07-30 RX ADMIN — Medication: at 22:00

## 2025-07-30 RX ADMIN — METHYLPREDNISOLONE SODIUM SUCCINATE 40 MG: 40 INJECTION, POWDER, FOR SOLUTION INTRAMUSCULAR; INTRAVENOUS at 04:40

## 2025-07-30 RX ADMIN — ASPIRIN 81 MG: 81 TABLET, COATED ORAL at 09:30

## 2025-07-30 RX ADMIN — METOPROLOL SUCCINATE 25 MG: 25 TABLET, EXTENDED RELEASE ORAL at 09:30

## 2025-07-30 RX ADMIN — ATORVASTATIN CALCIUM 80 MG: 80 TABLET, FILM COATED ORAL at 20:10

## 2025-07-30 ASSESSMENT — COGNITIVE AND FUNCTIONAL STATUS - GENERAL
WALKING IN HOSPITAL ROOM: A LITTLE
DAILY ACTIVITIY SCORE: 24
DAILY ACTIVITIY SCORE: 24
CLIMB 3 TO 5 STEPS WITH RAILING: A LITTLE
MOBILITY SCORE: 21
MOBILITY SCORE: 24
STANDING UP FROM CHAIR USING ARMS: A LITTLE

## 2025-07-30 ASSESSMENT — PAIN SCALES - GENERAL
PAINLEVEL_OUTOF10: 0 - NO PAIN
PAINLEVEL_OUTOF10: 0 - NO PAIN

## 2025-07-30 ASSESSMENT — PAIN - FUNCTIONAL ASSESSMENT: PAIN_FUNCTIONAL_ASSESSMENT: 0-10

## 2025-07-30 NOTE — PROGRESS NOTES
"Daily Progress Note    Lauren Thompson is a 60 y.o. female on day 1 of admission presenting with COPD exacerbation (Multi).    Subjective   Patient remains on supplemental oxygen and continues having dyspnea.  Patient states feels a little better than yesterday still short of breath with exertion.       Objective     Physical Exam    Physical Exam  Constitutional:       Appearance: She is ill-appearing.   HENT:      Head: Normocephalic.      Mouth/Throat:      Mouth: Mucous membranes are moist.     Eyes:      Pupils: Pupils are equal, round, and reactive to light.       Cardiovascular:      Rate and Rhythm: Normal rate and regular rhythm.      Heart sounds: Normal heart sounds, S1 normal and S2 normal.   Pulmonary:      Effort: Pulmonary effort is normal.      Breath sounds: Decreased breath sounds, wheezing and rhonchi present.   Abdominal:      General: Bowel sounds are normal.      Palpations: Abdomen is soft.     Musculoskeletal:         General: Normal range of motion.      Cervical back: Neck supple.     Skin:     General: Skin is warm.     Neurological:      Mental Status: She is alert and oriented to person, place, and time.      Motor: Weakness present.     Psychiatric:         Mood and Affect: Mood normal.         Behavior: Behavior normal.         Last Recorded Vitals  Blood pressure 125/59, pulse 99, temperature 36.6 °C (97.9 °F), temperature source Temporal, resp. rate 22, height (!) 1.473 m (4' 9.99\"), weight 80.8 kg (178 lb 2.1 oz), SpO2 98%.  Intake/Output last 3 Shifts:  I/O last 3 completed shifts:  In: 50 (0.6 mL/kg) [I.V.:50 (0.6 mL/kg)]  Out: - (0 mL/kg)   Weight: 80.8 kg     Medications  Scheduled medications  Scheduled Medications[1]  Continuous medications  Continuous Medications[2]  PRN medications  PRN Medications[3]    Labs  CBC:   Results from last 7 days   Lab Units 07/30/25  0544 07/29/25  1522   WBC AUTO x10*3/uL 9.2 8.3   RBC AUTO x10*6/uL 3.54* 4.14   HEMOGLOBIN g/dL 10.8* 12.5 "   HEMATOCRIT % 32.1* 37.7   MCV fL 91 91   MCH pg 30.5 30.2   MCHC g/dL 33.6 33.2   RDW % 12.9 12.9   PLATELETS AUTO x10*3/uL 246 285     CMP:    Results from last 7 days   Lab Units 07/30/25  0544 07/29/25  1522   SODIUM mmol/L 137 135*   POTASSIUM mmol/L 4.3 4.3   CHLORIDE mmol/L 106 99   CO2 mmol/L 25 23   BUN mg/dL 33* 41*   CREATININE mg/dL 1.16* 1.86*   GLUCOSE mg/dL 135* 201*   PROTEIN TOTAL g/dL  --  7.4   CALCIUM mg/dL 8.9 9.2   BILIRUBIN TOTAL mg/dL  --  0.4   ALK PHOS U/L  --  108   AST U/L  --  18   ALT U/L  --  17     BMP:    Results from last 7 days   Lab Units 07/30/25  0544 07/29/25  1522   SODIUM mmol/L 137 135*   POTASSIUM mmol/L 4.3 4.3   CHLORIDE mmol/L 106 99   CO2 mmol/L 25 23   BUN mg/dL 33* 41*   CREATININE mg/dL 1.16* 1.86*   CALCIUM mg/dL 8.9 9.2   GLUCOSE mg/dL 135* 201*     Magnesium:  Results from last 7 days   Lab Units 07/29/25  1522   MAGNESIUM mg/dL 1.98     Troponin:    Results from last 7 days   Lab Units 07/29/25  1640 07/29/25  1522   TROPHS ng/L 3 3     BNP:   Results from last 7 days   Lab Units 07/29/25  1522   BNP pg/mL 19     Lipid Panel:  Results from last 7 days   Lab Units 07/29/25  1522   INR  0.9   PROTIME seconds 10.4        Nutrition             Relevant Results  Results from last 7 days   Lab Units 07/30/25  1103 07/30/25  0606 07/30/25  0544 07/29/25  2143 07/29/25  1522   POCT GLUCOSE mg/dL 205* 142*  --  177*  --    GLUCOSE mg/dL  --   --  135*  --  201*     Lab Results   Component Value Date    HGBA1C 5.7 (H) 07/29/2025        Assessment/Plan    Acute on chronic COPD exacerbation    - CXR shows bronchial thickening smoking-related airway and chronic bronchitis  -Treat with solumedrol 40 mg IV 12H, duonebs QID, budesonide nebs BID  -O2 NC 2L keep sat >90%  -Continue Doxy and Mucinex  -Home O2 eval as needed  -Daily labs  -PT/OT evaluation    Anxiety  T2DM  -Resume home meds  -Accu-Cheks with sliding scale    DVTp: Heparin    PLAN: Home    Caryn Ovalles,  APRN-CNP    Plan of care was discussed extensively with patient.  Patient verbalized understanding through teach back method.  All question and concerns addressed upon examination.    Of note, this documentation is completed using the Dragon Dictation system (voice recognition software). There may be spelling and/or grammatical errors that were not corrected prior to final submission.             [1] aspirin, 81 mg, oral, Daily  atorvastatin, 80 mg, oral, Nightly  doxycylcine, 100 mg, oral, q12h JCARLOS  guaiFENesin, 600 mg, oral, BID  heparin (porcine), 5,000 Units, subcutaneous, q8h  insulin lispro, 0-5 Units, subcutaneous, Before meals & nightly  ipratropium-albuteroL, 3 mL, nebulization, q6h  levothyroxine, 88 mcg, oral, Daily  [Held by provider] lisinopril 20 mg, hydroCHLOROthiazide 25 mg for Zestoretic/Prinizide, , oral, Daily  magnesium oxide, 400 mg of magnesium oxide, oral, Daily  methylPREDNISolone sodium succinate (PF), 40 mg, intravenous, q12h  metoprolol succinate XL, 25 mg, oral, Daily  pantoprazole, 40 mg, oral, Daily before breakfast  polyethylene glycol, 17 g, oral, Daily  [Held by provider] ranolazine, 500 mg, oral, BID  sertraline, 100 mg, oral, Daily    [2] oxygen, 1 Dose    [3] PRN medications: acetaminophen **OR** acetaminophen **OR** acetaminophen, benzonatate, dextrose, dextrose, glucagon, glucagon, ipratropium-albuteroL, melatonin, ondansetron **OR** ondansetron

## 2025-07-30 NOTE — CARE PLAN
Problem: Pain - Adult  Goal: Verbalizes/displays adequate comfort level or baseline comfort level  Outcome: Progressing     Problem: Safety - Adult  Goal: Free from fall injury  Outcome: Progressing     Problem: Discharge Planning  Goal: Discharge to home or other facility with appropriate resources  Outcome: Progressing     Problem: Chronic Conditions and Co-morbidities  Goal: Patient's chronic conditions and co-morbidity symptoms are monitored and maintained or improved  Outcome: Progressing     Problem: Nutrition  Goal: Nutrient intake appropriate for maintaining nutritional needs  Outcome: Progressing   The patient's goals for the shift include breath better    The clinical goals for the shift include maintain SPO2 above 90%

## 2025-07-30 NOTE — PROGRESS NOTES
07/30/25 1520   Discharge Planning   Living Arrangements Family members  (brother-Victor Manuel)   Support Systems Family members   Type of Residence Private residence   Who is requesting discharge planning? Provider   Home or Post Acute Services In home services   Type of Home Care Services DME or oxygen  (SOB and persistent cough, currently requiring 3L NC continuously (typically only uses at night and as needed during the day)  2 1/2 -3L -Medical Services)   Expected Discharge Disposition Home   Does the patient need discharge transport arranged? No   Patient Choice   Provider Choice list and CMS website (https://medicare.gov/care-compare#search) for post-acute Quality and Resource Measure Data were provided and reviewed with: Patient   Intensity of Service   Intensity of Service 0-30 min     Met with pt sitting up in chair in room. Requiring continuous oxygen, 3L NC. SOB & coughing when talking. Has rollator at home. Drives. Plan for discharge home.  CT team to cont to follow for dc needs as identified.

## 2025-07-31 LAB
ANION GAP SERPL CALC-SCNC: 12 MMOL/L (ref 10–20)
BASOPHILS # BLD AUTO: 0.03 X10*3/UL (ref 0–0.1)
BASOPHILS NFR BLD AUTO: 0.2 %
BUN SERPL-MCNC: 30 MG/DL (ref 6–23)
CALCIUM SERPL-MCNC: 9.2 MG/DL (ref 8.6–10.3)
CHLORIDE SERPL-SCNC: 106 MMOL/L (ref 98–107)
CO2 SERPL-SCNC: 28 MMOL/L (ref 21–32)
CREAT SERPL-MCNC: 0.84 MG/DL (ref 0.5–1.05)
EGFRCR SERPLBLD CKD-EPI 2021: 79 ML/MIN/1.73M*2
EOSINOPHIL # BLD AUTO: 0 X10*3/UL (ref 0–0.7)
EOSINOPHIL NFR BLD AUTO: 0 %
ERYTHROCYTE [DISTWIDTH] IN BLOOD BY AUTOMATED COUNT: 13.2 % (ref 11.5–14.5)
GLUCOSE BLD MANUAL STRIP-MCNC: 127 MG/DL (ref 74–99)
GLUCOSE BLD MANUAL STRIP-MCNC: 171 MG/DL (ref 74–99)
GLUCOSE BLD MANUAL STRIP-MCNC: 235 MG/DL (ref 74–99)
GLUCOSE BLD MANUAL STRIP-MCNC: 94 MG/DL (ref 74–99)
GLUCOSE SERPL-MCNC: 89 MG/DL (ref 74–99)
HCT VFR BLD AUTO: 36.2 % (ref 36–46)
HGB BLD-MCNC: 11.8 G/DL (ref 12–16)
HOLD SPECIMEN: NORMAL
IMM GRANULOCYTES # BLD AUTO: 0.12 X10*3/UL (ref 0–0.7)
IMM GRANULOCYTES NFR BLD AUTO: 0.9 % (ref 0–0.9)
LYMPHOCYTES # BLD AUTO: 1.58 X10*3/UL (ref 1.2–4.8)
LYMPHOCYTES NFR BLD AUTO: 11.4 %
MCH RBC QN AUTO: 30.3 PG (ref 26–34)
MCHC RBC AUTO-ENTMCNC: 32.6 G/DL (ref 32–36)
MCV RBC AUTO: 93 FL (ref 80–100)
MONOCYTES # BLD AUTO: 1.59 X10*3/UL (ref 0.1–1)
MONOCYTES NFR BLD AUTO: 11.5 %
NEUTROPHILS # BLD AUTO: 10.55 X10*3/UL (ref 1.2–7.7)
NEUTROPHILS NFR BLD AUTO: 76 %
NRBC BLD-RTO: 0 /100 WBCS (ref 0–0)
PLATELET # BLD AUTO: 329 X10*3/UL (ref 150–450)
POTASSIUM SERPL-SCNC: 4.7 MMOL/L (ref 3.5–5.3)
RBC # BLD AUTO: 3.89 X10*6/UL (ref 4–5.2)
SODIUM SERPL-SCNC: 141 MMOL/L (ref 136–145)
WBC # BLD AUTO: 13.9 X10*3/UL (ref 4.4–11.3)

## 2025-07-31 PROCEDURE — 2500000001 HC RX 250 WO HCPCS SELF ADMINISTERED DRUGS (ALT 637 FOR MEDICARE OP): Performed by: STUDENT IN AN ORGANIZED HEALTH CARE EDUCATION/TRAINING PROGRAM

## 2025-07-31 PROCEDURE — 82947 ASSAY GLUCOSE BLOOD QUANT: CPT

## 2025-07-31 PROCEDURE — 94640 AIRWAY INHALATION TREATMENT: CPT

## 2025-07-31 PROCEDURE — 1200000002 HC GENERAL ROOM WITH TELEMETRY DAILY

## 2025-07-31 PROCEDURE — 85025 COMPLETE CBC W/AUTO DIFF WBC: CPT | Performed by: STUDENT IN AN ORGANIZED HEALTH CARE EDUCATION/TRAINING PROGRAM

## 2025-07-31 PROCEDURE — 2500000004 HC RX 250 GENERAL PHARMACY W/ HCPCS (ALT 636 FOR OP/ED): Performed by: STUDENT IN AN ORGANIZED HEALTH CARE EDUCATION/TRAINING PROGRAM

## 2025-07-31 PROCEDURE — 2500000005 HC RX 250 GENERAL PHARMACY W/O HCPCS: Performed by: STUDENT IN AN ORGANIZED HEALTH CARE EDUCATION/TRAINING PROGRAM

## 2025-07-31 PROCEDURE — 99232 SBSQ HOSP IP/OBS MODERATE 35: CPT

## 2025-07-31 PROCEDURE — 36415 COLL VENOUS BLD VENIPUNCTURE: CPT | Performed by: STUDENT IN AN ORGANIZED HEALTH CARE EDUCATION/TRAINING PROGRAM

## 2025-07-31 PROCEDURE — 2500000001 HC RX 250 WO HCPCS SELF ADMINISTERED DRUGS (ALT 637 FOR MEDICARE OP): Performed by: NURSE PRACTITIONER

## 2025-07-31 PROCEDURE — 2500000001 HC RX 250 WO HCPCS SELF ADMINISTERED DRUGS (ALT 637 FOR MEDICARE OP)

## 2025-07-31 PROCEDURE — 2500000002 HC RX 250 W HCPCS SELF ADMINISTERED DRUGS (ALT 637 FOR MEDICARE OP, ALT 636 FOR OP/ED): Performed by: STUDENT IN AN ORGANIZED HEALTH CARE EDUCATION/TRAINING PROGRAM

## 2025-07-31 PROCEDURE — 80048 BASIC METABOLIC PNL TOTAL CA: CPT | Performed by: STUDENT IN AN ORGANIZED HEALTH CARE EDUCATION/TRAINING PROGRAM

## 2025-07-31 PROCEDURE — 2500000004 HC RX 250 GENERAL PHARMACY W/ HCPCS (ALT 636 FOR OP/ED): Mod: JZ

## 2025-07-31 RX ORDER — HYDROXYZINE HYDROCHLORIDE 25 MG/1
25 TABLET, FILM COATED ORAL EVERY 6 HOURS PRN
Status: DISPENSED | OUTPATIENT
Start: 2025-07-31

## 2025-07-31 RX ADMIN — RANOLAZINE 500 MG: 500 TABLET, FILM COATED, EXTENDED RELEASE ORAL at 23:15

## 2025-07-31 RX ADMIN — IPRATROPIUM BROMIDE AND ALBUTEROL SULFATE 3 ML: 2.5; .5 SOLUTION RESPIRATORY (INHALATION) at 00:44

## 2025-07-31 RX ADMIN — RANOLAZINE 500 MG: 500 TABLET, FILM COATED, EXTENDED RELEASE ORAL at 09:56

## 2025-07-31 RX ADMIN — HEPARIN SODIUM 5000 UNITS: 5000 INJECTION INTRAVENOUS; SUBCUTANEOUS at 14:17

## 2025-07-31 RX ADMIN — METHYLPREDNISOLONE SODIUM SUCCINATE 40 MG: 40 INJECTION, POWDER, FOR SOLUTION INTRAMUSCULAR; INTRAVENOUS at 05:23

## 2025-07-31 RX ADMIN — DOXYCYCLINE HYCLATE 100 MG: 100 TABLET, FILM COATED ORAL at 09:57

## 2025-07-31 RX ADMIN — ASPIRIN 81 MG: 81 TABLET, COATED ORAL at 09:56

## 2025-07-31 RX ADMIN — DEXTROMETHORPHAN POLISTIREX 30 MG: 30 SUSPENSION ORAL at 09:58

## 2025-07-31 RX ADMIN — DEXTROMETHORPHAN POLISTIREX 30 MG: 30 SUSPENSION ORAL at 22:14

## 2025-07-31 RX ADMIN — INSULIN LISPRO 1 UNITS: 100 INJECTION, SOLUTION INTRAVENOUS; SUBCUTANEOUS at 12:21

## 2025-07-31 RX ADMIN — ATORVASTATIN CALCIUM 80 MG: 80 TABLET, FILM COATED ORAL at 22:15

## 2025-07-31 RX ADMIN — POLYETHYLENE GLYCOL 3350 17 G: 17 POWDER, FOR SOLUTION ORAL at 09:57

## 2025-07-31 RX ADMIN — HYDROXYZINE HYDROCHLORIDE 25 MG: 25 TABLET ORAL at 22:16

## 2025-07-31 RX ADMIN — Medication 1 DOSE: at 12:22

## 2025-07-31 RX ADMIN — GUAIFENESIN AND CODEINE PHOSPHATE 5 ML: 100; 10 SOLUTION ORAL at 02:14

## 2025-07-31 RX ADMIN — IPRATROPIUM BROMIDE AND ALBUTEROL SULFATE 3 ML: 2.5; .5 SOLUTION RESPIRATORY (INHALATION) at 19:28

## 2025-07-31 RX ADMIN — SERTRALINE HYDROCHLORIDE 100 MG: 50 TABLET, FILM COATED ORAL at 09:55

## 2025-07-31 RX ADMIN — HEPARIN SODIUM 5000 UNITS: 5000 INJECTION INTRAVENOUS; SUBCUTANEOUS at 22:15

## 2025-07-31 RX ADMIN — IPRATROPIUM BROMIDE AND ALBUTEROL SULFATE 3 ML: 2.5; .5 SOLUTION RESPIRATORY (INHALATION) at 12:07

## 2025-07-31 RX ADMIN — Medication: at 19:31

## 2025-07-31 RX ADMIN — MAGNESIUM OXIDE 400 MG (241.3 MG MAGNESIUM) TABLET 1 TABLET: TABLET at 09:55

## 2025-07-31 RX ADMIN — GUAIFENESIN 600 MG: 600 TABLET, EXTENDED RELEASE ORAL at 22:15

## 2025-07-31 RX ADMIN — GUAIFENESIN 600 MG: 600 TABLET, EXTENDED RELEASE ORAL at 09:55

## 2025-07-31 RX ADMIN — IPRATROPIUM BROMIDE AND ALBUTEROL SULFATE 3 ML: .5; 3 SOLUTION RESPIRATORY (INHALATION) at 09:26

## 2025-07-31 RX ADMIN — ACETAMINOPHEN 650 MG: 325 TABLET ORAL at 01:27

## 2025-07-31 RX ADMIN — DOXYCYCLINE HYCLATE 100 MG: 100 TABLET, FILM COATED ORAL at 22:14

## 2025-07-31 RX ADMIN — LEVOTHYROXINE SODIUM 88 MCG: 0.09 TABLET ORAL at 05:23

## 2025-07-31 RX ADMIN — METHYLPREDNISOLONE SODIUM SUCCINATE 40 MG: 40 INJECTION, POWDER, FOR SOLUTION INTRAMUSCULAR; INTRAVENOUS at 14:17

## 2025-07-31 RX ADMIN — INSULIN LISPRO 2 UNITS: 100 INJECTION, SOLUTION INTRAVENOUS; SUBCUTANEOUS at 22:14

## 2025-07-31 RX ADMIN — LISINOPRIL: 20 TABLET ORAL at 09:56

## 2025-07-31 RX ADMIN — METHYLPREDNISOLONE SODIUM SUCCINATE 40 MG: 40 INJECTION, POWDER, FOR SOLUTION INTRAMUSCULAR; INTRAVENOUS at 22:14

## 2025-07-31 RX ADMIN — METOPROLOL SUCCINATE 25 MG: 25 TABLET, EXTENDED RELEASE ORAL at 09:56

## 2025-07-31 RX ADMIN — IPRATROPIUM BROMIDE AND ALBUTEROL SULFATE 3 ML: 2.5; .5 SOLUTION RESPIRATORY (INHALATION) at 07:56

## 2025-07-31 RX ADMIN — PANTOPRAZOLE SODIUM 40 MG: 40 TABLET, DELAYED RELEASE ORAL at 06:40

## 2025-07-31 ASSESSMENT — COGNITIVE AND FUNCTIONAL STATUS - GENERAL
DAILY ACTIVITIY SCORE: 24
WALKING IN HOSPITAL ROOM: A LITTLE
CLIMB 3 TO 5 STEPS WITH RAILING: A LITTLE
MOBILITY SCORE: 22

## 2025-07-31 ASSESSMENT — PAIN SCALES - GENERAL
PAINLEVEL_OUTOF10: 0 - NO PAIN
PAINLEVEL_OUTOF10: 0 - NO PAIN

## 2025-07-31 ASSESSMENT — PAIN - FUNCTIONAL ASSESSMENT: PAIN_FUNCTIONAL_ASSESSMENT: 0-10

## 2025-07-31 NOTE — CARE PLAN
Problem: Pain - Adult  Goal: Verbalizes/displays adequate comfort level or baseline comfort level  7/31/2025 1852 by Ariana Tello RN  Outcome: Progressing  7/31/2025 0827 by Ariana Tello RN  Flowsheets (Taken 7/31/2025 0827)  Verbalizes/displays adequate comfort level or baseline comfort level:   Administer analgesics based on type and severity of pain and evaluate response   Encourage patient to monitor pain and request assistance   Consider cultural and social influences on pain and pain management   Assess pain using appropriate pain scale   Implement non-pharmacological measures as appropriate and evaluate response     Problem: Safety - Adult  Goal: Free from fall injury  7/31/2025 1852 by Ariana Tello RN  Outcome: Progressing  7/31/2025 0827 by Ariana Tello RN  Flowsheets (Taken 7/31/2025 0827)  Free from fall injury: Instruct family/caregiver on patient safety     Problem: Discharge Planning  Goal: Discharge to home or other facility with appropriate resources  7/31/2025 1852 by Ariana Tello RN  Outcome: Progressing  7/31/2025 0827 by Ariana Tello RN  Flowsheets (Taken 7/31/2025 0827)  Discharge to home or other facility with appropriate resources:   Identify barriers to discharge with patient and caregiver   Identify discharge learning needs (meds, wound care, etc)   Refer to discharge planning if patient needs post-hospital services based on physician order or complex needs related to functional status, cognitive ability or social support system   Arrange for needed discharge resources and transportation as appropriate     Problem: Chronic Conditions and Co-morbidities  Goal: Patient's chronic conditions and co-morbidity symptoms are monitored and maintained or improved  7/31/2025 1852 by Ariana Tello RN  Outcome: Progressing  7/31/2025 0827 by Ariana Tello RN  Flowsheets (Taken 7/31/2025 0827)  Care Plan - Patient's Chronic Conditions and Co-Morbidity Symptoms are Monitored  and Maintained or Improved:   Monitor and assess patient's chronic conditions and comorbid symptoms for stability, deterioration, or improvement   Collaborate with multidisciplinary team to address chronic and comorbid conditions and prevent exacerbation or deterioration   Update acute care plan with appropriate goals if chronic or comorbid symptoms are exacerbated and prevent overall improvement and discharge     Problem: Nutrition  Goal: Nutrient intake appropriate for maintaining nutritional needs  Outcome: Progressing     Problem: Diabetes  Goal: Maintain electrolyte levels within acceptable range throughout shift  7/31/2025 1852 by Ariana Tello RN  Outcome: Progressing  7/31/2025 0827 by Ariana Tello RN  Flowsheets (Taken 7/31/2025 0827)  Maintain electrolyte levels within acceptable range throughout shift:   Med administration/monitoring of effect   Monitor urine output  Goal: Maintain glucose levels >70mg/dl to <250mg/dl throughout shift  7/31/2025 1852 by Ariana Tello RN  Outcome: Progressing  7/31/2025 0827 by Ariana Tello RN  Flowsheets (Taken 7/31/2025 0827)  Maintain glucose levels >70mg/dl to <250mg/dl throughout shift: Med administration/monitoring of effect  Goal: Learn about and adhere to nutrition recommendations by end of shift  7/31/2025 1852 by Ariana Tello RN  Outcome: Progressing  7/31/2025 0827 by Ariana Tello RN  Flowsheets (Taken 7/31/2025 0827)  Learn about and adhere to nutrition recommendations by end of shift: Ensure/encourage compliance with appropriate diet  Goal: Vital signs within normal range for age by end of shift  7/31/2025 1852 by Ariana Tello RN  Outcome: Progressing  7/31/2025 0827 by Ariana Tello RN  Flowsheets (Taken 7/31/2025 0827)  Vital signs within normal range for age by end of shift: Med administration/monitoring of effect  Goal: Receive DSME education by end of shift  7/31/2025 1852 by Ariana Tello RN  Outcome:  Progressing  7/31/2025 0827 by Ariana Tello RN  Flowsheets (Taken 7/31/2025 0827)  Receive DSME education by end of shift: Provide patient centered education on Diabetic Self Management Education     Problem: Fall/Injury  Goal: Not fall by end of shift  Outcome: Progressing  Goal: Be free from injury by end of the shift  Outcome: Progressing  Goal: Verbalize understanding of personal risk factors for fall in the hospital  Outcome: Progressing  Goal: Verbalize understanding of risk factor reduction measures to prevent injury from fall in the home  Outcome: Progressing  Goal: Use assistive devices by end of the shift  Outcome: Progressing  Goal: Pace activities to prevent fatigue by end of the shift  Outcome: Progressing   The patient's goals for the shift include breath better    The clinical goals for the shift include Patient will remain free from episodes of dyspnea throughout this shift.    Over the shift, the patient did make progress toward care plan goals.

## 2025-07-31 NOTE — PROGRESS NOTES
"Daily Progress Note    Lauren Thompson is a 60 y.o. female on day 2 of admission presenting with COPD exacerbation (Multi).    Subjective   Patient seen sitting on side of the bed feeling a little better today.  Patient remains on supplemental O2 and still coughing.  Continue treatment and anticipate discharge in 24 to 48 hours.  Patient still SOB with exertion.       Objective     Physical Exam    Physical Exam  Constitutional:       Appearance: She is obese. She is ill-appearing.   HENT:      Head: Normocephalic.      Mouth/Throat:      Mouth: Mucous membranes are moist.     Eyes:      Pupils: Pupils are equal, round, and reactive to light.       Cardiovascular:      Rate and Rhythm: Normal rate and regular rhythm.      Heart sounds: Normal heart sounds, S1 normal and S2 normal.   Pulmonary:      Effort: Pulmonary effort is normal.      Breath sounds: Decreased air movement present. Decreased breath sounds present.   Abdominal:      General: Bowel sounds are normal.      Palpations: Abdomen is soft.     Musculoskeletal:         General: Normal range of motion.      Cervical back: Neck supple.     Skin:     General: Skin is warm.     Neurological:      Mental Status: She is alert and oriented to person, place, and time.      Motor: Weakness present.     Psychiatric:         Mood and Affect: Mood normal.         Behavior: Behavior normal.         Last Recorded Vitals  Blood pressure 135/63, pulse 76, temperature 36.5 °C (97.7 °F), temperature source Temporal, resp. rate 20, height (!) 1.473 m (4' 9.99\"), weight 80.8 kg (178 lb 2.1 oz), SpO2 98%.  Intake/Output last 3 Shifts:  No intake/output data recorded.    Medications  Scheduled medications  Scheduled Medications[1]  Continuous medications  Continuous Medications[2]  PRN medications  PRN Medications[3]    Labs  CBC:   Results from last 7 days   Lab Units 07/31/25  0547 07/30/25  0544 07/29/25  1522   WBC AUTO x10*3/uL 13.9* 9.2 8.3   RBC AUTO x10*6/uL 3.89* " 3.54* 4.14   HEMOGLOBIN g/dL 11.8* 10.8* 12.5   HEMATOCRIT % 36.2 32.1* 37.7   MCV fL 93 91 91   MCH pg 30.3 30.5 30.2   MCHC g/dL 32.6 33.6 33.2   RDW % 13.2 12.9 12.9   PLATELETS AUTO x10*3/uL 329 246 285     CMP:    Results from last 7 days   Lab Units 07/31/25  0547 07/30/25  0544 07/29/25  1522   SODIUM mmol/L 141 137 135*   POTASSIUM mmol/L 4.7 4.3 4.3   CHLORIDE mmol/L 106 106 99   CO2 mmol/L 28 25 23   BUN mg/dL 30* 33* 41*   CREATININE mg/dL 0.84 1.16* 1.86*   GLUCOSE mg/dL 89 135* 201*   PROTEIN TOTAL g/dL  --   --  7.4   CALCIUM mg/dL 9.2 8.9 9.2   BILIRUBIN TOTAL mg/dL  --   --  0.4   ALK PHOS U/L  --   --  108   AST U/L  --   --  18   ALT U/L  --   --  17     BMP:    Results from last 7 days   Lab Units 07/31/25  0547 07/30/25  0544 07/29/25  1522   SODIUM mmol/L 141 137 135*   POTASSIUM mmol/L 4.7 4.3 4.3   CHLORIDE mmol/L 106 106 99   CO2 mmol/L 28 25 23   BUN mg/dL 30* 33* 41*   CREATININE mg/dL 0.84 1.16* 1.86*   CALCIUM mg/dL 9.2 8.9 9.2   GLUCOSE mg/dL 89 135* 201*     Magnesium:  Results from last 7 days   Lab Units 07/29/25  1522   MAGNESIUM mg/dL 1.98     Troponin:    Results from last 7 days   Lab Units 07/29/25  1640 07/29/25  1522   TROPHS ng/L 3 3     BNP:   Results from last 7 days   Lab Units 07/29/25  1522   BNP pg/mL 19     Lipid Panel:  Results from last 7 days   Lab Units 07/29/25  1522   INR  0.9   PROTIME seconds 10.4        Nutrition             Relevant Results  Results from last 7 days   Lab Units 07/31/25  0639 07/31/25  0547 07/30/25  2024 07/30/25  1624 07/30/25  1103 07/30/25  0606 07/30/25  0544 07/29/25  2143 07/29/25  1522   POCT GLUCOSE mg/dL 94  --  189* 122* 205* 142*  --    < >  --    GLUCOSE mg/dL  --  89  --   --   --   --  135*  --  201*    < > = values in this interval not displayed.     Lab Results   Component Value Date    HGBA1C 5.7 (H) 07/29/2025        Assessment/Plan    Acute on chronic COPD exacerbation    - CXR shows bronchial thickening smoking-related  airway and chronic bronchitis  -Treat with solumedrol 40 mg IV 12H, duonebs QID, budesonide nebs BID  -Continue doxycycline  -O2 NC 2L keep sat >90%  -Home O2 eval as needed  -Daily labs  -Will add scheduled Delsym  -PT/OT evaluation      Anxiety  T2DM  -Resume home meds  -Accu-Cheks with sliding scale      DVTp: Lovenox    PLAN: Home with spouse    Caryn CRUZ Charlette, APRN-CNP    Plan of care was discussed extensively with patient.  Patient verbalized understanding through teach back method.  All question and concerns addressed upon examination.    Of note, this documentation is completed using the Dragon Dictation system (voice recognition software). There may be spelling and/or grammatical errors that were not corrected prior to final submission.             [1] aspirin, 81 mg, oral, Daily  atorvastatin, 80 mg, oral, Nightly  dextromethorphan polistirex ER, 30 mg, oral, q12h JCARLOS  doxycylcine, 100 mg, oral, q12h JCARLOS  guaiFENesin, 600 mg, oral, BID  heparin (porcine), 5,000 Units, subcutaneous, q8h  insulin lispro, 0-5 Units, subcutaneous, Before meals & nightly  ipratropium-albuteroL, 3 mL, nebulization, q6h  levothyroxine, 88 mcg, oral, Daily  lisinopril 20 mg, hydroCHLOROthiazide 25 mg for Zestoretic/Prinizide, , oral, Daily  magnesium oxide, 400 mg of magnesium oxide, oral, Daily  methylPREDNISolone sodium succinate (PF), 40 mg, intravenous, q12h  metoprolol succinate XL, 25 mg, oral, Daily  pantoprazole, 40 mg, oral, Daily before breakfast  polyethylene glycol, 17 g, oral, Daily  ranolazine, 500 mg, oral, BID  sertraline, 100 mg, oral, Daily    [2] oxygen, 1 Dose    [3] PRN medications: acetaminophen **OR** acetaminophen **OR** acetaminophen, benzonatate, dextrose, dextrose, glucagon, glucagon, ipratropium-albuteroL, melatonin, ondansetron **OR** ondansetron

## 2025-08-01 LAB
ANION GAP SERPL CALC-SCNC: 10 MMOL/L (ref 10–20)
BASOPHILS # BLD AUTO: 0.02 X10*3/UL (ref 0–0.1)
BASOPHILS NFR BLD AUTO: 0.2 %
BUN SERPL-MCNC: 28 MG/DL (ref 6–23)
CALCIUM SERPL-MCNC: 9.5 MG/DL (ref 8.6–10.3)
CHLORIDE SERPL-SCNC: 102 MMOL/L (ref 98–107)
CO2 SERPL-SCNC: 32 MMOL/L (ref 21–32)
CREAT SERPL-MCNC: 0.9 MG/DL (ref 0.5–1.05)
EGFRCR SERPLBLD CKD-EPI 2021: 72 ML/MIN/1.73M*2
EOSINOPHIL # BLD AUTO: 0 X10*3/UL (ref 0–0.7)
EOSINOPHIL NFR BLD AUTO: 0 %
ERYTHROCYTE [DISTWIDTH] IN BLOOD BY AUTOMATED COUNT: 12.9 % (ref 11.5–14.5)
GLUCOSE BLD MANUAL STRIP-MCNC: 167 MG/DL (ref 74–99)
GLUCOSE BLD MANUAL STRIP-MCNC: 176 MG/DL (ref 74–99)
GLUCOSE BLD MANUAL STRIP-MCNC: 185 MG/DL (ref 74–99)
GLUCOSE BLD MANUAL STRIP-MCNC: 203 MG/DL (ref 74–99)
GLUCOSE SERPL-MCNC: 196 MG/DL (ref 74–99)
HCT VFR BLD AUTO: 37.2 % (ref 36–46)
HGB BLD-MCNC: 12.2 G/DL (ref 12–16)
HOLD SPECIMEN: NORMAL
IMM GRANULOCYTES # BLD AUTO: 0.18 X10*3/UL (ref 0–0.7)
IMM GRANULOCYTES NFR BLD AUTO: 1.9 % (ref 0–0.9)
LYMPHOCYTES # BLD AUTO: 1.34 X10*3/UL (ref 1.2–4.8)
LYMPHOCYTES NFR BLD AUTO: 14.1 %
MCH RBC QN AUTO: 30.4 PG (ref 26–34)
MCHC RBC AUTO-ENTMCNC: 32.8 G/DL (ref 32–36)
MCV RBC AUTO: 93 FL (ref 80–100)
MONOCYTES # BLD AUTO: 0.7 X10*3/UL (ref 0.1–1)
MONOCYTES NFR BLD AUTO: 7.3 %
NEUTROPHILS # BLD AUTO: 7.29 X10*3/UL (ref 1.2–7.7)
NEUTROPHILS NFR BLD AUTO: 76.5 %
NRBC BLD-RTO: 0 /100 WBCS (ref 0–0)
PLATELET # BLD AUTO: 303 X10*3/UL (ref 150–450)
POTASSIUM SERPL-SCNC: 4.9 MMOL/L (ref 3.5–5.3)
RBC # BLD AUTO: 4.01 X10*6/UL (ref 4–5.2)
SODIUM SERPL-SCNC: 139 MMOL/L (ref 136–145)
WBC # BLD AUTO: 9.5 X10*3/UL (ref 4.4–11.3)

## 2025-08-01 PROCEDURE — 2500000004 HC RX 250 GENERAL PHARMACY W/ HCPCS (ALT 636 FOR OP/ED): Mod: JZ

## 2025-08-01 PROCEDURE — 94640 AIRWAY INHALATION TREATMENT: CPT

## 2025-08-01 PROCEDURE — 85025 COMPLETE CBC W/AUTO DIFF WBC: CPT | Performed by: STUDENT IN AN ORGANIZED HEALTH CARE EDUCATION/TRAINING PROGRAM

## 2025-08-01 PROCEDURE — 2500000004 HC RX 250 GENERAL PHARMACY W/ HCPCS (ALT 636 FOR OP/ED): Performed by: STUDENT IN AN ORGANIZED HEALTH CARE EDUCATION/TRAINING PROGRAM

## 2025-08-01 PROCEDURE — 1200000002 HC GENERAL ROOM WITH TELEMETRY DAILY

## 2025-08-01 PROCEDURE — 2500000001 HC RX 250 WO HCPCS SELF ADMINISTERED DRUGS (ALT 637 FOR MEDICARE OP): Performed by: STUDENT IN AN ORGANIZED HEALTH CARE EDUCATION/TRAINING PROGRAM

## 2025-08-01 PROCEDURE — 2500000001 HC RX 250 WO HCPCS SELF ADMINISTERED DRUGS (ALT 637 FOR MEDICARE OP)

## 2025-08-01 PROCEDURE — 99232 SBSQ HOSP IP/OBS MODERATE 35: CPT

## 2025-08-01 PROCEDURE — 82947 ASSAY GLUCOSE BLOOD QUANT: CPT

## 2025-08-01 PROCEDURE — 36415 COLL VENOUS BLD VENIPUNCTURE: CPT | Performed by: STUDENT IN AN ORGANIZED HEALTH CARE EDUCATION/TRAINING PROGRAM

## 2025-08-01 PROCEDURE — 2500000002 HC RX 250 W HCPCS SELF ADMINISTERED DRUGS (ALT 637 FOR MEDICARE OP, ALT 636 FOR OP/ED): Performed by: STUDENT IN AN ORGANIZED HEALTH CARE EDUCATION/TRAINING PROGRAM

## 2025-08-01 PROCEDURE — 80048 BASIC METABOLIC PNL TOTAL CA: CPT | Performed by: STUDENT IN AN ORGANIZED HEALTH CARE EDUCATION/TRAINING PROGRAM

## 2025-08-01 PROCEDURE — 2500000005 HC RX 250 GENERAL PHARMACY W/O HCPCS: Performed by: STUDENT IN AN ORGANIZED HEALTH CARE EDUCATION/TRAINING PROGRAM

## 2025-08-01 PROCEDURE — 2500000001 HC RX 250 WO HCPCS SELF ADMINISTERED DRUGS (ALT 637 FOR MEDICARE OP): Performed by: NURSE PRACTITIONER

## 2025-08-01 RX ADMIN — HEPARIN SODIUM 5000 UNITS: 5000 INJECTION INTRAVENOUS; SUBCUTANEOUS at 14:00

## 2025-08-01 RX ADMIN — METHYLPREDNISOLONE SODIUM SUCCINATE 40 MG: 40 INJECTION, POWDER, FOR SOLUTION INTRAMUSCULAR; INTRAVENOUS at 14:00

## 2025-08-01 RX ADMIN — SERTRALINE HYDROCHLORIDE 100 MG: 50 TABLET, FILM COATED ORAL at 08:55

## 2025-08-01 RX ADMIN — LEVOTHYROXINE SODIUM 88 MCG: 0.09 TABLET ORAL at 06:23

## 2025-08-01 RX ADMIN — IPRATROPIUM BROMIDE AND ALBUTEROL SULFATE 3 ML: 2.5; .5 SOLUTION RESPIRATORY (INHALATION) at 07:02

## 2025-08-01 RX ADMIN — RANOLAZINE 500 MG: 500 TABLET, FILM COATED, EXTENDED RELEASE ORAL at 08:55

## 2025-08-01 RX ADMIN — LISINOPRIL: 20 TABLET ORAL at 08:55

## 2025-08-01 RX ADMIN — RANOLAZINE 500 MG: 500 TABLET, FILM COATED, EXTENDED RELEASE ORAL at 22:07

## 2025-08-01 RX ADMIN — Medication 1 DOSE: at 07:05

## 2025-08-01 RX ADMIN — INSULIN LISPRO 1 UNITS: 100 INJECTION, SOLUTION INTRAVENOUS; SUBCUTANEOUS at 06:23

## 2025-08-01 RX ADMIN — IPRATROPIUM BROMIDE AND ALBUTEROL SULFATE 3 ML: 2.5; .5 SOLUTION RESPIRATORY (INHALATION) at 19:43

## 2025-08-01 RX ADMIN — ASPIRIN 81 MG: 81 TABLET, COATED ORAL at 08:55

## 2025-08-01 RX ADMIN — METHYLPREDNISOLONE SODIUM SUCCINATE 40 MG: 40 INJECTION, POWDER, FOR SOLUTION INTRAMUSCULAR; INTRAVENOUS at 22:06

## 2025-08-01 RX ADMIN — METOPROLOL SUCCINATE 25 MG: 25 TABLET, EXTENDED RELEASE ORAL at 08:55

## 2025-08-01 RX ADMIN — PANTOPRAZOLE SODIUM 40 MG: 40 TABLET, DELAYED RELEASE ORAL at 06:23

## 2025-08-01 RX ADMIN — HEPARIN SODIUM 5000 UNITS: 5000 INJECTION INTRAVENOUS; SUBCUTANEOUS at 06:23

## 2025-08-01 RX ADMIN — INSULIN LISPRO 2 UNITS: 100 INJECTION, SOLUTION INTRAVENOUS; SUBCUTANEOUS at 22:12

## 2025-08-01 RX ADMIN — INSULIN LISPRO 1 UNITS: 100 INJECTION, SOLUTION INTRAVENOUS; SUBCUTANEOUS at 16:23

## 2025-08-01 RX ADMIN — GUAIFENESIN 600 MG: 600 TABLET, EXTENDED RELEASE ORAL at 22:07

## 2025-08-01 RX ADMIN — HYDROXYZINE HYDROCHLORIDE 25 MG: 25 TABLET ORAL at 22:09

## 2025-08-01 RX ADMIN — GUAIFENESIN 600 MG: 600 TABLET, EXTENDED RELEASE ORAL at 08:55

## 2025-08-01 RX ADMIN — DOXYCYCLINE HYCLATE 100 MG: 100 TABLET, FILM COATED ORAL at 22:07

## 2025-08-01 RX ADMIN — IPRATROPIUM BROMIDE AND ALBUTEROL SULFATE 3 ML: 2.5; .5 SOLUTION RESPIRATORY (INHALATION) at 12:56

## 2025-08-01 RX ADMIN — IPRATROPIUM BROMIDE AND ALBUTEROL SULFATE 3 ML: 2.5; .5 SOLUTION RESPIRATORY (INHALATION) at 01:15

## 2025-08-01 RX ADMIN — MAGNESIUM OXIDE 400 MG (241.3 MG MAGNESIUM) TABLET 1 TABLET: TABLET at 08:54

## 2025-08-01 RX ADMIN — DEXTROMETHORPHAN POLISTIREX 30 MG: 30 SUSPENSION ORAL at 08:55

## 2025-08-01 RX ADMIN — METHYLPREDNISOLONE SODIUM SUCCINATE 40 MG: 40 INJECTION, POWDER, FOR SOLUTION INTRAMUSCULAR; INTRAVENOUS at 06:23

## 2025-08-01 RX ADMIN — DEXTROMETHORPHAN POLISTIREX 30 MG: 30 SUSPENSION ORAL at 22:51

## 2025-08-01 RX ADMIN — HYDROXYZINE HYDROCHLORIDE 25 MG: 25 TABLET ORAL at 13:19

## 2025-08-01 RX ADMIN — HEPARIN SODIUM 5000 UNITS: 5000 INJECTION INTRAVENOUS; SUBCUTANEOUS at 22:06

## 2025-08-01 RX ADMIN — ATORVASTATIN CALCIUM 80 MG: 80 TABLET, FILM COATED ORAL at 22:07

## 2025-08-01 RX ADMIN — DOXYCYCLINE HYCLATE 100 MG: 100 TABLET, FILM COATED ORAL at 08:54

## 2025-08-01 RX ADMIN — INSULIN LISPRO 1 UNITS: 100 INJECTION, SOLUTION INTRAVENOUS; SUBCUTANEOUS at 12:28

## 2025-08-01 ASSESSMENT — COGNITIVE AND FUNCTIONAL STATUS - GENERAL
DAILY ACTIVITIY SCORE: 24
MOBILITY SCORE: 24
WALKING IN HOSPITAL ROOM: A LITTLE
DAILY ACTIVITIY SCORE: 24
CLIMB 3 TO 5 STEPS WITH RAILING: A LITTLE
MOBILITY SCORE: 22
MOBILITY SCORE: 24
DAILY ACTIVITIY SCORE: 24

## 2025-08-01 ASSESSMENT — PAIN SCALES - GENERAL: PAINLEVEL_OUTOF10: 0 - NO PAIN

## 2025-08-01 NOTE — DOCUMENTATION CLARIFICATION NOTE
"    PATIENT:               REGGIE GALLEGO  ACCT #:                  [id number]  MRN:                       [id number]  :                       1965  ADMIT DATE:       2025 2:56 PM  DISCH DATE:  RESPONDING PROVIDER #:        [id number]          PROVIDER RESPONSE TEXT:    Pneumonia ruled out after workup    CDI QUERY TEXT:    Clarification    Instruction:    Based on your assessment of the patient and the clinical information, please provide the requested documentation by clicking on the appropriate radio button and enter any additional information if prompted.    Question: Please further clarify the diagnosis of pneumonia as    When answering this query, please exercise your independent professional judgment. The fact that a question is being asked, does not imply that any particular answer is desired or expected.    The patient's clinical indicators include:  Clinical Information: Pt presents w/ c/o increasing sob    Clinical Indicators:    Per the ED provider note dated 25 - \"However given patient's reported productive cough and shortness of breath CT imaging is obtained for further evaluation.  It further supports findings of COPD, but also shows findings concerning for atypical pneumonia.  Patient treated with doxycycline. \"    CT chest dated 25 - \"1.  Bronchial thickening with features of smoking related airway disease and chronic bronchitis with some subtle tree-in-bud nodules seen scattered throughout the lungs presumably reflecting atypical infection\"    Per the H/P dated 25 - \"Continue doxycycline given CT findings\"    Treatment: Doxy 100mg po Q12, cxr, CT chest    Risk Factors: COPD  Options provided:  -- Pneumonia ruled out after workup  -- Pneumonia ruled in for this admission  -- Other - I will add my own diagnosis  -- Refer to Clinical Documentation Reviewer    Query created by: Alex Carlton on 2025 7:14 AM      Electronically signed by:  JACQUES WRIGHT-CNP 2025 " 8:06 AM

## 2025-08-01 NOTE — PROGRESS NOTES
"Daily Progress Note    Lauren Thompson is a 60 y.o. female on day 3 of admission presenting with COPD exacerbation (Multi).    Subjective   Patient seen sitting on side of the bed feeling a little better than yesterday.  Still having some dyspnea with exertion.  Will continue current treatment.  Patient does qualify for home O2.  Patient does have home O2 at night and as needed during the day at baseline.       Objective     Physical Exam    Physical Exam  Constitutional:       Appearance: She is obese. She is ill-appearing.   HENT:      Head: Normocephalic.      Mouth/Throat:      Mouth: Mucous membranes are moist.     Eyes:      Pupils: Pupils are equal, round, and reactive to light.       Cardiovascular:      Rate and Rhythm: Normal rate and regular rhythm.      Heart sounds: Normal heart sounds, S1 normal and S2 normal.   Pulmonary:      Effort: Pulmonary effort is normal.      Breath sounds: Decreased breath sounds present.   Abdominal:      General: Bowel sounds are normal.      Palpations: Abdomen is soft.     Musculoskeletal:         General: Normal range of motion.      Cervical back: Neck supple.     Skin:     General: Skin is warm.     Neurological:      Mental Status: She is alert and oriented to person, place, and time.     Psychiatric:         Mood and Affect: Mood normal.         Behavior: Behavior normal.         Last Recorded Vitals  Blood pressure 129/63, pulse 80, temperature 35.5 °C (95.9 °F), resp. rate 20, height (!) 1.473 m (4' 9.99\"), weight 80.8 kg (178 lb 2.1 oz), SpO2 96%.  Intake/Output last 3 Shifts:  I/O last 3 completed shifts:  In: 1080 (13.4 mL/kg) [P.O.:1080]  Out: - (0 mL/kg)   Weight: 80.8 kg     Medications  Scheduled medications  Scheduled Medications[1]  Continuous medications  Continuous Medications[2]  PRN medications  PRN Medications[3]    Labs  CBC:   Results from last 7 days   Lab Units 08/01/25  0608 07/31/25  0547 07/30/25  0544   WBC AUTO x10*3/uL 9.5 13.9* 9.2   RBC " AUTO x10*6/uL 4.01 3.89* 3.54*   HEMOGLOBIN g/dL 12.2 11.8* 10.8*   HEMATOCRIT % 37.2 36.2 32.1*   MCV fL 93 93 91   MCH pg 30.4 30.3 30.5   MCHC g/dL 32.8 32.6 33.6   RDW % 12.9 13.2 12.9   PLATELETS AUTO x10*3/uL 303 329 246     CMP:    Results from last 7 days   Lab Units 08/01/25  0608 07/31/25  0547 07/30/25  0544 07/29/25  1522   SODIUM mmol/L 139 141 137 135*   POTASSIUM mmol/L 4.9 4.7 4.3 4.3   CHLORIDE mmol/L 102 106 106 99   CO2 mmol/L 32 28 25 23   BUN mg/dL 28* 30* 33* 41*   CREATININE mg/dL 0.90 0.84 1.16* 1.86*   GLUCOSE mg/dL 196* 89 135* 201*   PROTEIN TOTAL g/dL  --   --   --  7.4   CALCIUM mg/dL 9.5 9.2 8.9 9.2   BILIRUBIN TOTAL mg/dL  --   --   --  0.4   ALK PHOS U/L  --   --   --  108   AST U/L  --   --   --  18   ALT U/L  --   --   --  17     BMP:    Results from last 7 days   Lab Units 08/01/25  0608 07/31/25  0547 07/30/25  0544   SODIUM mmol/L 139 141 137   POTASSIUM mmol/L 4.9 4.7 4.3   CHLORIDE mmol/L 102 106 106   CO2 mmol/L 32 28 25   BUN mg/dL 28* 30* 33*   CREATININE mg/dL 0.90 0.84 1.16*   CALCIUM mg/dL 9.5 9.2 8.9   GLUCOSE mg/dL 196* 89 135*     Magnesium:  Results from last 7 days   Lab Units 07/29/25  1522   MAGNESIUM mg/dL 1.98     Troponin:    Results from last 7 days   Lab Units 07/29/25  1640 07/29/25  1522   TROPHS ng/L 3 3     BNP:   Results from last 7 days   Lab Units 07/29/25  1522   BNP pg/mL 19     Lipid Panel:  Results from last 7 days   Lab Units 07/29/25  1522   INR  0.9   PROTIME seconds 10.4        Nutrition             Relevant Results  Results from last 7 days   Lab Units 08/01/25  1122 08/01/25  0612 08/01/25  0608 07/31/25  2101 07/31/25  1613 07/31/25  1110 07/31/25  0639 07/31/25  0547 07/30/25  0606 07/30/25  0544 07/29/25  2143 07/29/25  1522   POCT GLUCOSE mg/dL 167* 185*  --  235* 127* 171*   < >  --    < >  --    < >  --    GLUCOSE mg/dL  --   --  196*  --   --   --   --  89  --  135*  --  201*    < > = values in this interval not displayed.     Lab  Results   Component Value Date    HGBA1C 5.7 (H) 07/29/2025        Assessment/Plan    Acute on chronic COPD exacerbation    - CXR shows bronchial thickening smoking-related airway and chronic bronchitis  -Treat with solumedrol 40 mg IV 12H, duonebs QID, budesonide nebs BID  -O2 NC 2L keep sat >90%  -Home O2 eval as needed patient does qualify  -Daily labs  -Will add scheduled Delsym  -Pulmonology consult    Anxiety  T2DM  -Resume home meds  -Accu-Cheks with sliding scale      DVTp: Heparin subcu    PLAN: Home with     Caryn CRUZ Charlette, APRN-CNP    Plan of care was discussed extensively with patient.  Patient verbalized understanding through teach back method.  All question and concerns addressed upon examination.    Of note, this documentation is completed using the Dragon Dictation system (voice recognition software). There may be spelling and/or grammatical errors that were not corrected prior to final submission.             [1] aspirin, 81 mg, oral, Daily  atorvastatin, 80 mg, oral, Nightly  dextromethorphan polistirex ER, 30 mg, oral, q12h JCARLOS  doxycylcine, 100 mg, oral, q12h JCARLOS  guaiFENesin, 600 mg, oral, BID  heparin (porcine), 5,000 Units, subcutaneous, q8h  insulin lispro, 0-5 Units, subcutaneous, Before meals & nightly  ipratropium-albuteroL, 3 mL, nebulization, q6h  levothyroxine, 88 mcg, oral, Daily  lisinopril 20 mg, hydroCHLOROthiazide 25 mg for Zestoretic/Prinizide, , oral, Daily  magnesium oxide, 400 mg of magnesium oxide, oral, Daily  methylPREDNISolone sodium succinate (PF), 40 mg, intravenous, q8h  metoprolol succinate XL, 25 mg, oral, Daily  pantoprazole, 40 mg, oral, Daily before breakfast  polyethylene glycol, 17 g, oral, Daily  ranolazine, 500 mg, oral, BID  sertraline, 100 mg, oral, Daily    [2] oxygen, 1 Dose    [3] PRN medications: acetaminophen **OR** acetaminophen **OR** acetaminophen, benzonatate, dextrose, dextrose, glucagon, glucagon, hydrOXYzine HCL, ipratropium-albuteroL,  melatonin, ondansetron **OR** ondansetron

## 2025-08-01 NOTE — CARE PLAN
Problem: Pain - Adult  Goal: Verbalizes/displays adequate comfort level or baseline comfort level  Outcome: Progressing     Problem: Safety - Adult  Goal: Free from fall injury  Outcome: Progressing     Problem: Discharge Planning  Goal: Discharge to home or other facility with appropriate resources  Outcome: Progressing     Problem: Chronic Conditions and Co-morbidities  Goal: Patient's chronic conditions and co-morbidity symptoms are monitored and maintained or improved  Outcome: Progressing       The patient's goals for the shift include breath better    The clinical goals for the shift include Patient's cough will be controlled throughout the shift

## 2025-08-01 NOTE — CONSULTS
Reason For Consult  Evaluation of COPD exacerbation, acute/chronic hypoxemic respiratory failure, acute bronchitis and other pulmonary related issues  Referring provider:-Caryn Ovalles CNP    HISTORY OF PRESENT ILLNESS:     Chief Complaint: Shortness of breath           Lauren Thompson is a 60 y.o. female with a history of COPD, chronic hypoxic respiratory failure on nightly 2L home oxygen, CAD, essential hypertension, type 2 diabetes, GERD, hypothyroidism, anxiety presenting with shortness of breath for the last 3 days.  Patient states that she has been using her rescue inhaler much more frequently.  She has had a persistent cough however has been unable to clear any mucus.  She endorses subjective fever and chills.  She has some chest discomfort related to coughing but denies any avelino chest pain.  Denies any swelling in her legs or orthopnea.         Spoke with the ER provider regarding workup and need for admission.  Patient initially presented tachycardic, tachypneic, hypoxic, afebrile, normotensive.  She was placed on 3 L nasal cannula.  Labs notable for hyperglycemia, hyponatremia, BELLE, lactic acidosis.  ABG shows pH 7.35, PCO2 41, PO2 89, bicarb 22.6.  CT chest showed features of chronic bronchitis as well as subtle tree-in-bud nodularities bilaterally presumably reflecting atypical infection.  Patient received IV Solu-Medrol, IV magnesium, nebulizer treatments, doxycycline, 2 L normal saline, and hydroxyzine in the ER. Patient will require admission for further workup and treatment.       I was asked to evaluate and follow from a pulmonary perspective patient normally sees Dr. Cordoba.  She is noted to have severe COPD for which she is on home O2 2 to 3 L nightly and AM as needed.  She is also on generic DuoNebs which she takes twice daily Trelegy Ellipta 100 mcg inhaler once a day rescue albuterol inhaler.  She has never been intubated for COPD although she has had prior multiple visits and multiple  hospitalization.  She has no history of DVT or pulmonary embolism.  She denies any history of sleep apnea or insomnia.  She denies any history of congestive heart failure or cardiac arrhythmias.  She is noted to have coronary artery disease but has not received any stents so far and follows with Dr. Faith.  She is a diabetic.  She comes in with several day history of increasing shortness of breath with wheezing and has COPD exacerbation with acute bronchitis.  Despite being on steroids 40 every 12 she has not improved in fact slightly worsened.  And steroids were increased to 40 every 8 and I was asked to see her from a pulmonary perspective.     ROS     10 point review of systems negative except per HPI            PAST HISTORIES:           Past Medical History     See HPI           Surgical History     She has a past surgical history that includes Other surgical history (07/08/2019); Other surgical history (07/08/2019); Other surgical history (07/08/2019); Other surgical history (07/08/2019); Partial hysterectomy (09/25/2023); Bladder surgery (09/25/2023); Cardiac catheterization; Appendectomy; Hysterectomy; Sinus surgery; Tonsillectomy; and [place] tooth extraction.           Social History     She reports that she quit smoking about 2 years ago. Her smoking use included cigarettes. She started smoking about 44 years ago. She has a 41.5 pack-year smoking history. She has never used smokeless tobacco. She reports that she does not currently use alcohol. She reports that she does not use drugs.           Family History     [Family History]           [Family History]     Problem Relation Name Age of Onset      Other (Cardiac disorder) Mother Justyna Corley        Other (CVA) Mother Justyna Corley        Hypertension Mother Justyna Corley        Other (Diabetes mellitus) Mother Justyna Corley        Heart disease Mother Justyna Corley        Kidney disease Mother Justyna Corley         Stroke Mother Justyna Stottlemire        Alcohol abuse Mother Justyna Stottlemire        COPD Mother Justyna Stottlemire        Diabetes Mother Justyna Stottlemire        Cancer Father Oswaldo Knisley        Heart disease Father Oswaldo Knisley        Hypertension Father Oswaldo Knisley        Lung disease Father Oswaldo Knisley        Stroke Father Oswaldo Knisley        Arthritis Father Oswaldo Mariisley        COPD Father Oswaldo Mariisley        Diabetes Father Oswaldo Mariiskarina        Cancer Sister Sunshine Keweenaw        Depression Sister Sunshine Keweenaw        Ovarian cancer Sister Sunshine Keweenaw        Depression Sister Jacquie Carl        Diabetes type II Sister Jacquie Carl        Hypertension Sister Jacquie Carl        Obesity Sister Jacquie Carl        Drug abuse Sister Jacquie Carl        Depression Sister Priya Gage        Diabetes type I Sister Lisa De Leon        Hypertension Sister Lisa De Leon        Thyroid disease Sister Lisa De Leon        Accidental death Sister Lisa De Leon        Diabetes Sister Lisa De Leon        Drug abuse Sister Lisa De Leon        Heart attack Sister Sweetie Saenz        Heart disease Sister Sweetie Saenz        Hypertension Sister Sweetie Saenz        Obesity Sister Sweetie Saenz        COPD Sister Sweetie Saenz        Diabetes Sister Sweetie Saenz        Hypertension Sister Miley Heuy        Stroke Sister Miley Heuy        Heart disease Maternal Grandmother Sweetie Trujilloor        Kidney disease Maternal Grandmother Sweetie Trujilloor        Diabetes Maternal Grandmother Sweetie Trujilloor        Cancer Paternal Grandmother Cheyanne Arreaga        Breast cancer Paternal Grandmother Cheyanne Arreaga                   Allergies:     Patient has no known allergies.                 OBJECTIVE:           Last Recorded Vitals     /66 (BP Location: Left arm, Patient Position: Sitting)   Pulse 96   Temp 36.8 °C (98.2 °F) (Temporal)   Resp 18   Wt 76.2 kg (168 lb)   SpO2 97%            Last I/O:     I/O last 3 completed shifts:     In: 50  (0.7 mL/kg) [I.V.:50 (0.7 mL/kg)]     Out: - (0 mL/kg)      Weight: 76.2 kg            Physical Exam      Gen: Appears anxious, appears stated age     HEENT: EOM, MMM     CV: RRR, no murmurs rubs or gallops     Resp: Diffuse wheezing bilaterally, mildly tachypneic when speaking in full sentences, no respiratory distress     Abdomen: soft, NT,+BS     LE: No edema, no deformity     Neuro: A&Ox4, moving all extremities           LABS AND IMAGING:           All pertinent labs and imaging reviewed personally by me.           Relevant Results     Labs Reviewed     CBC WITH AUTO DIFFERENTIAL - Abnormal         Result Value       WBC 8.3         nRBC 0.0         RBC 4.14         Hemoglobin 12.5         Hematocrit 37.7         MCV 91         MCH 30.2         MCHC 33.2         RDW 12.9         Platelets 285         Neutrophils % 89.2         Immature Granulocytes %, Automated 0.7         Lymphocytes % 6.2         Monocytes % 3.7         Eosinophils % 0.0         Basophils % 0.2         Neutrophils Absolute 7.42         Immature Granulocytes Absolute, Automated 0.06         Lymphocytes Absolute 0.52 (*)        Monocytes Absolute 0.31         Eosinophils Absolute 0.00         Basophils Absolute 0.02        COMPREHENSIVE METABOLIC PANEL - Abnormal      Glucose 201 (*)        Sodium 135 (*)        Potassium 4.3         Chloride 99         Bicarbonate 23         Anion Gap 17         Urea Nitrogen 41 (*)        Creatinine 1.86 (*)        eGFR 30 (*)        Calcium 9.2         Albumin 4.4         Alkaline Phosphatase 108         Total Protein 7.4         AST 18         Bilirubin, Total 0.4         ALT 17        LACTATE - Abnormal      Lactate 3.5 (*)        Narrative:       Venipuncture immediately after or during the administration of Metamizole may lead to falsely low results. Testing should be performed immediately prior to Metamizole dosing.     BLOOD GAS ARTERIAL FULL PANEL - Abnormal      POCT pH, Arterial 7.35 (*)        POCT  pCO2, Arterial 41         POCT pO2, Arterial 89         POCT SO2, Arterial 98         POCT Oxy Hemoglobin, Arterial 96.4         POCT Hematocrit Calculated, Arterial 38.0         POCT Sodium, Arterial 134 (*)        POCT Potassium, Arterial 4.4         POCT Chloride, Arterial 100         POCT Ionized Calcium, Arterial 1.20         POCT Glucose, Arterial 174 (*)        POCT Lactate, Arterial 3.0 (*)        POCT Base Excess, Arterial -2.9 (*)        POCT HCO3 Calculated, Arterial 22.6         POCT Hemoglobin, Arterial 12.5         POCT Anion Gap, Arterial 16         Patient Temperature           FiO2 32         Site of Arterial Puncture LB         Clifford's Test NA        LACTATE - Abnormal      Lactate 2.7 (*)        Narrative:       Venipuncture immediately after or during the administration of Metamizole may lead to falsely low results. Testing should be performed immediately prior to Metamizole dosing.     BLOOD GAS LACTIC ACID, VENOUS - Abnormal      POCT Lactate, Venous 2.7 (*)       MAGNESIUM - Normal      Magnesium 1.98        B-TYPE NATRIURETIC PEPTIDE - Normal      BNP 19         Narrative:          <100 pg/mL - Heart failure unlikely     100-299 pg/mL - Intermediate probability of acute heart                     failure exacerbation. Correlate with clinical                     context and patient history.       >=300 pg/mL - Heart Failure likely. Correlate with clinical                     context and patient history.           BNP testing is performed using different testing methodology at [facility] than at other [facility] hospitals. Direct result comparisons should only be made within the same method.            PROTIME-INR - Normal      Protime 10.4         INR 0.9        D-DIMER, VTE EXCLUSION - Normal      D-Dimer, Quantitative VTE Exclusion 286         Narrative:       The VTE Exclusion D-Dimer assay is reported in ng/mL Fibrinogen Equivalent Units (FEU).           Per 's  instructions for use, a value of less than 500 ng/mL (FEU) may help to exclude DVT or PE in outpatients when the assay is used with a clinical pretest probability assessment.([facility] must utilize and document eCalc 'Wells Score Deep Vein Thrombosis Risk' for DVT exclusion only. Emergency Department should utilize  Guidelines for Emergency Department Use of the VTE Exclusion D-Dimer and Clinical Pretest probability assessment model for DVT or PE exclusion.)     SARS-COV-2 PCR - Normal      Coronavirus 2019, PCR Not Detected         Narrative:       This assay is an FDA-cleared, in vitro diagnostic nucleic acid amplification test for the qualitative detection and differentiation of SARS CoV-2 from nasopharyngeal specimens collected from individuals with signs and symptoms of respiratory tract infections, and has been validated for use at [facility]. Negative results do not preclude COVID-19 infections and should not be used as the sole basis for diagnosis, treatment, or other management decisions. Testing for SARS CoV-2 is recommended only for patients who meet current clinical and/or epidemiological criteria defined by federal, state, or local public health directives.     SERIAL TROPONIN-INITIAL - Normal      Troponin I, High Sensitivity 3         Narrative:       Less than 99th percentile of normal range cutoff-     Female and children under 18 years old <14 ng/L; Male <21 ng/L: Negative     Repeat testing should be performed if clinically indicated.            Female and children under 18 years old 14-50 ng/L; Male 21-50 ng/L:     Consistent with possible cardiac damage and possible increased clinical      risk. Serial measurements may help to assess extent of myocardial damage.            >50 ng/L: Consistent with cardiac damage, increased clinical risk and     myocardial infarction. Serial measurements may help assess extent of      myocardial damage.            NOTE: Children less than 1  year old may have higher baseline troponin      levels and results should be interpreted in conjunction with the overall      clinical context.            NOTE: Troponin I testing is performed using a different      testing methodology at [facility] than at other      [facility]. Direct result comparisons should only      be made within the same method.     SERIAL TROPONIN, 1 HOUR - Normal      Troponin I, High Sensitivity 3         Narrative:       Less than 99th percentile of normal range cutoff-     Female and children under 18 years old <14 ng/L; Male <21 ng/L: Negative     Repeat testing should be performed if clinically indicated.            Female and children under 18 years old 14-50 ng/L; Male 21-50 ng/L:     Consistent with possible cardiac damage and possible increased clinical      risk. Serial measurements may help to assess extent of myocardial damage.            >50 ng/L: Consistent with cardiac damage, increased clinical risk and     myocardial infarction. Serial measurements may help assess extent of      myocardial damage.            NOTE: Children less than 1 year old may have higher baseline troponin      levels and results should be interpreted in conjunction with the overall      clinical context.            NOTE: Troponin I testing is performed using a different      testing methodology at [facility] than at other      [facility]. Direct result comparisons should only      be made within the same method.     TROPONIN SERIES- (INITIAL, 1 HR)      Narrative:       The following orders were created for panel order Troponin I Series, High Sensitivity (0, 1 HR).     Procedure                               Abnormality         Status                        ---------                               -----------         ------                        Troponin I, High Sensiti...[[id number]]  Normal              Final result                  Troponin, High  Sensitivi...[[id number]]  Normal              Final result                        Please view results for these tests on the individual orders.           CT chest wo IV contrast     Final Result     1.  Bronchial thickening with features of smoking related airway     disease and chronic bronchitis with some subtle tree-in-bud nodules     seen scattered throughout the lungs presumably reflecting atypical     infection continued annual surveillance as advised per prior CT     screening exam. No growing pulmonary nodules                   Signed by: Joshua Rutherford 7/29/2025 5:32 PM     Dictation workstation:   [id number]           XR chest 1 view     Final Result     No evidence of acute intrathoracic abnormality.                    ASSESSMENT & PLAN:      60 y.o. female with a history of COPD, chronic hypoxic respiratory failure on nightly 2L home oxygen, CAD, essential hypertension, type 2 diabetes, GERD, hypothyroidism, anxiety presenting with acute on chronic hypoxic respiratory failure secondary to COPD exacerbation.           #.  COPD exacerbation     #.  Acute on chronic hypoxic respiratory failure     - P/w SOB and persistent cough, currently requiring 3L NC continuously (typically only uses at night and as needed during the day)     - ABG negative for hypercapnia, D-dimer negative     - CT w/o contrast reviewed and shows evidence of chronic bronchitis and tree-in-bud nodularities bilaterally likely related to atypical infection           Plan:     - IV Solu-Medrol 40 mg every 8 hours     - Scheduled DuoNebs every 6 hours, and as needed     - Continue doxycycline given CT findings     - Scheduled Mucinex, Acapella 4 times daily for secretion clearance     - Continue nasal cannula, wean to baseline as able     - Telemetry and continuous pulse oximetry     - Respiratory therapy consult           #.  BELLE     - Scr 1.86 (prior labs reviewed, baseline around 0.8-0.9)     - Likely pre-renal due to poor PO intake in  the setting of COPD exacerbation     - Will defer further fluids as she received 2L in the ER     - Encourage PO intake     - Holding home Lisinopril and hydrochlorothiazide     - Trend renal function with daily BMP           #.  Lactic acidosis     - Likely multifactorial and secondary to hypoxia and hypovolemia     - Plan as above for management of acute on chronic hypoxic respiratory failure and BELLE     - Trend lactate     - Does not appear septic, however on doxycycline as above           #.  CAD     - History of multivessel CAD per prior cath report, no history of PTCA     - EKG reviewed and shows NSR without acute ischemic changes, troponin negative x 2     - Continuing home aspirin, atorvastatin           #.  Essential hypertension     - Continuing home metoprolol     - Holding home lisinopril and hydrochlorothiazide as above in the setting of BELLE           #.  Type 2 diabetes without complication     - Last A1C 6.1% in 09/2024, repeat this admission     - Holding home metformin given lactic acidosis     - SSI, Accu-cheks, hypoglycemic protocol           #.  GERD     #.  Hypothyroidism     #.  Anxiety     - Continuing home medications           VTE PPX: subQ heparin and SCDs           Pulmonary comments:-Long discussion with the patient.  Agree with current treatment of COPD exacerbation with IV Solu-Medrol 40 every 8 hours and regular DuoNebs Q6 and as needed.  Agree with Mucinex as ordered.  Agree with doxycycline for acute bronchitis.  Agree with supplemental oxygen for acute/chronic hypoxemic respiratory failure to keep SpO2 more than 92%.  Plan of care discussed at length with patient.  I shall continue to monitor this patient while here and she is to see Dr. Cordoba immediately after discharge as an outpatient.  A/T/S pamphlets on COPD, oxygen use, pulse oximetry, inhaler use, healthy sleep, insomnia, sleep apnea and drowsy driving were given.   I spent 55 minutes in the professional and overall care of  this patient.      Lit Loya MD

## 2025-08-01 NOTE — CARE PLAN
The patient's goals for the shift include feel better    The clinical goals for the shift include breathe with ease        Problem: Pain - Adult  Goal: Verbalizes/displays adequate comfort level or baseline comfort level  Outcome: Progressing     Problem: Safety - Adult  Goal: Free from fall injury  Outcome: Progressing     Problem: Discharge Planning  Goal: Discharge to home or other facility with appropriate resources  Outcome: Progressing     Problem: Chronic Conditions and Co-morbidities  Goal: Patient's chronic conditions and co-morbidity symptoms are monitored and maintained or improved  Outcome: Progressing     Problem: Nutrition  Goal: Nutrient intake appropriate for maintaining nutritional needs  Outcome: Progressing     Problem: Diabetes  Goal: Maintain electrolyte levels within acceptable range throughout shift  Outcome: Progressing  Goal: Maintain glucose levels >70mg/dl to <250mg/dl throughout shift  Outcome: Progressing  Goal: Learn about and adhere to nutrition recommendations by end of shift  Outcome: Progressing  Goal: Vital signs within normal range for age by end of shift  Outcome: Progressing  Goal: Receive DSME education by end of shift  Outcome: Progressing     Problem: Fall/Injury  Goal: Not fall by end of shift  Outcome: Progressing  Goal: Be free from injury by end of the shift  Outcome: Progressing  Goal: Verbalize understanding of personal risk factors for fall in the hospital  Outcome: Progressing  Goal: Verbalize understanding of risk factor reduction measures to prevent injury from fall in the home  Outcome: Progressing  Goal: Use assistive devices by end of the shift  Outcome: Progressing  Goal: Pace activities to prevent fatigue by end of the shift  Outcome: Progressing

## 2025-08-02 LAB
ANION GAP SERPL CALC-SCNC: 11 MMOL/L (ref 10–20)
BASOPHILS # BLD AUTO: 0.04 X10*3/UL (ref 0–0.1)
BASOPHILS NFR BLD AUTO: 0.3 %
BUN SERPL-MCNC: 32 MG/DL (ref 6–23)
CALCIUM SERPL-MCNC: 9.6 MG/DL (ref 8.6–10.3)
CHLORIDE SERPL-SCNC: 99 MMOL/L (ref 98–107)
CO2 SERPL-SCNC: 31 MMOL/L (ref 21–32)
CREAT SERPL-MCNC: 0.88 MG/DL (ref 0.5–1.05)
EGFRCR SERPLBLD CKD-EPI 2021: 74 ML/MIN/1.73M*2
EOSINOPHIL # BLD AUTO: 0 X10*3/UL (ref 0–0.7)
EOSINOPHIL NFR BLD AUTO: 0 %
ERYTHROCYTE [DISTWIDTH] IN BLOOD BY AUTOMATED COUNT: 12.8 % (ref 11.5–14.5)
GLUCOSE BLD MANUAL STRIP-MCNC: 153 MG/DL (ref 74–99)
GLUCOSE BLD MANUAL STRIP-MCNC: 186 MG/DL (ref 74–99)
GLUCOSE BLD MANUAL STRIP-MCNC: 197 MG/DL (ref 74–99)
GLUCOSE BLD MANUAL STRIP-MCNC: 223 MG/DL (ref 74–99)
GLUCOSE SERPL-MCNC: 154 MG/DL (ref 74–99)
HCT VFR BLD AUTO: 38.9 % (ref 36–46)
HGB BLD-MCNC: 12.9 G/DL (ref 12–16)
HOLD SPECIMEN: NORMAL
IMM GRANULOCYTES # BLD AUTO: 0.38 X10*3/UL (ref 0–0.7)
IMM GRANULOCYTES NFR BLD AUTO: 2.7 % (ref 0–0.9)
LYMPHOCYTES # BLD AUTO: 2.48 X10*3/UL (ref 1.2–4.8)
LYMPHOCYTES NFR BLD AUTO: 17.3 %
MCH RBC QN AUTO: 29.9 PG (ref 26–34)
MCHC RBC AUTO-ENTMCNC: 33.2 G/DL (ref 32–36)
MCV RBC AUTO: 90 FL (ref 80–100)
MONOCYTES # BLD AUTO: 0.96 X10*3/UL (ref 0.1–1)
MONOCYTES NFR BLD AUTO: 6.7 %
NEUTROPHILS # BLD AUTO: 10.46 X10*3/UL (ref 1.2–7.7)
NEUTROPHILS NFR BLD AUTO: 73 %
NRBC BLD-RTO: 0 /100 WBCS (ref 0–0)
PLATELET # BLD AUTO: 379 X10*3/UL (ref 150–450)
POTASSIUM SERPL-SCNC: 4.3 MMOL/L (ref 3.5–5.3)
RBC # BLD AUTO: 4.31 X10*6/UL (ref 4–5.2)
SODIUM SERPL-SCNC: 137 MMOL/L (ref 136–145)
WBC # BLD AUTO: 14.3 X10*3/UL (ref 4.4–11.3)

## 2025-08-02 PROCEDURE — 2500000004 HC RX 250 GENERAL PHARMACY W/ HCPCS (ALT 636 FOR OP/ED): Mod: JZ

## 2025-08-02 PROCEDURE — 94640 AIRWAY INHALATION TREATMENT: CPT

## 2025-08-02 PROCEDURE — 2500000001 HC RX 250 WO HCPCS SELF ADMINISTERED DRUGS (ALT 637 FOR MEDICARE OP): Performed by: STUDENT IN AN ORGANIZED HEALTH CARE EDUCATION/TRAINING PROGRAM

## 2025-08-02 PROCEDURE — 2500000001 HC RX 250 WO HCPCS SELF ADMINISTERED DRUGS (ALT 637 FOR MEDICARE OP): Performed by: NURSE PRACTITIONER

## 2025-08-02 PROCEDURE — 2500000002 HC RX 250 W HCPCS SELF ADMINISTERED DRUGS (ALT 637 FOR MEDICARE OP, ALT 636 FOR OP/ED): Performed by: STUDENT IN AN ORGANIZED HEALTH CARE EDUCATION/TRAINING PROGRAM

## 2025-08-02 PROCEDURE — 1210000001 HC SEMI-PRIVATE ROOM DAILY

## 2025-08-02 PROCEDURE — 82947 ASSAY GLUCOSE BLOOD QUANT: CPT

## 2025-08-02 PROCEDURE — 2500000005 HC RX 250 GENERAL PHARMACY W/O HCPCS: Performed by: STUDENT IN AN ORGANIZED HEALTH CARE EDUCATION/TRAINING PROGRAM

## 2025-08-02 PROCEDURE — 36415 COLL VENOUS BLD VENIPUNCTURE: CPT | Performed by: STUDENT IN AN ORGANIZED HEALTH CARE EDUCATION/TRAINING PROGRAM

## 2025-08-02 PROCEDURE — 2500000004 HC RX 250 GENERAL PHARMACY W/ HCPCS (ALT 636 FOR OP/ED): Performed by: STUDENT IN AN ORGANIZED HEALTH CARE EDUCATION/TRAINING PROGRAM

## 2025-08-02 PROCEDURE — 2500000001 HC RX 250 WO HCPCS SELF ADMINISTERED DRUGS (ALT 637 FOR MEDICARE OP)

## 2025-08-02 PROCEDURE — 80048 BASIC METABOLIC PNL TOTAL CA: CPT | Performed by: STUDENT IN AN ORGANIZED HEALTH CARE EDUCATION/TRAINING PROGRAM

## 2025-08-02 PROCEDURE — 85025 COMPLETE CBC W/AUTO DIFF WBC: CPT | Performed by: STUDENT IN AN ORGANIZED HEALTH CARE EDUCATION/TRAINING PROGRAM

## 2025-08-02 PROCEDURE — 99232 SBSQ HOSP IP/OBS MODERATE 35: CPT | Performed by: STUDENT IN AN ORGANIZED HEALTH CARE EDUCATION/TRAINING PROGRAM

## 2025-08-02 RX ADMIN — GUAIFENESIN 600 MG: 600 TABLET, EXTENDED RELEASE ORAL at 20:26

## 2025-08-02 RX ADMIN — METHYLPREDNISOLONE SODIUM SUCCINATE 40 MG: 40 INJECTION, POWDER, FOR SOLUTION INTRAMUSCULAR; INTRAVENOUS at 21:31

## 2025-08-02 RX ADMIN — HEPARIN SODIUM 5000 UNITS: 5000 INJECTION INTRAVENOUS; SUBCUTANEOUS at 07:01

## 2025-08-02 RX ADMIN — PANTOPRAZOLE SODIUM 40 MG: 40 TABLET, DELAYED RELEASE ORAL at 07:02

## 2025-08-02 RX ADMIN — RANOLAZINE 500 MG: 500 TABLET, FILM COATED, EXTENDED RELEASE ORAL at 08:19

## 2025-08-02 RX ADMIN — ASPIRIN 81 MG: 81 TABLET, COATED ORAL at 08:19

## 2025-08-02 RX ADMIN — DEXTROMETHORPHAN POLISTIREX 30 MG: 30 SUSPENSION ORAL at 08:20

## 2025-08-02 RX ADMIN — DOXYCYCLINE HYCLATE 100 MG: 100 TABLET, FILM COATED ORAL at 20:26

## 2025-08-02 RX ADMIN — HEPARIN SODIUM 5000 UNITS: 5000 INJECTION INTRAVENOUS; SUBCUTANEOUS at 21:31

## 2025-08-02 RX ADMIN — INSULIN LISPRO 1 UNITS: 100 INJECTION, SOLUTION INTRAVENOUS; SUBCUTANEOUS at 07:01

## 2025-08-02 RX ADMIN — ATORVASTATIN CALCIUM 80 MG: 80 TABLET, FILM COATED ORAL at 20:26

## 2025-08-02 RX ADMIN — DOXYCYCLINE HYCLATE 100 MG: 100 TABLET, FILM COATED ORAL at 08:19

## 2025-08-02 RX ADMIN — METHYLPREDNISOLONE SODIUM SUCCINATE 40 MG: 40 INJECTION, POWDER, FOR SOLUTION INTRAMUSCULAR; INTRAVENOUS at 13:38

## 2025-08-02 RX ADMIN — IPRATROPIUM BROMIDE AND ALBUTEROL SULFATE 3 ML: 2.5; .5 SOLUTION RESPIRATORY (INHALATION) at 13:09

## 2025-08-02 RX ADMIN — METHYLPREDNISOLONE SODIUM SUCCINATE 40 MG: 40 INJECTION, POWDER, FOR SOLUTION INTRAMUSCULAR; INTRAVENOUS at 07:01

## 2025-08-02 RX ADMIN — IPRATROPIUM BROMIDE AND ALBUTEROL SULFATE 3 ML: 2.5; .5 SOLUTION RESPIRATORY (INHALATION) at 06:53

## 2025-08-02 RX ADMIN — LEVOTHYROXINE SODIUM 88 MCG: 0.09 TABLET ORAL at 07:02

## 2025-08-02 RX ADMIN — DEXTROMETHORPHAN POLISTIREX 30 MG: 30 SUSPENSION ORAL at 20:26

## 2025-08-02 RX ADMIN — INSULIN LISPRO 1 UNITS: 100 INJECTION, SOLUTION INTRAVENOUS; SUBCUTANEOUS at 11:36

## 2025-08-02 RX ADMIN — GUAIFENESIN 600 MG: 600 TABLET, EXTENDED RELEASE ORAL at 08:19

## 2025-08-02 RX ADMIN — RANOLAZINE 500 MG: 500 TABLET, FILM COATED, EXTENDED RELEASE ORAL at 20:26

## 2025-08-02 RX ADMIN — HYDROXYZINE HYDROCHLORIDE 25 MG: 25 TABLET ORAL at 20:26

## 2025-08-02 RX ADMIN — Medication: at 06:00

## 2025-08-02 RX ADMIN — IPRATROPIUM BROMIDE AND ALBUTEROL SULFATE 3 ML: 2.5; .5 SOLUTION RESPIRATORY (INHALATION) at 20:52

## 2025-08-02 RX ADMIN — IPRATROPIUM BROMIDE AND ALBUTEROL SULFATE 3 ML: 2.5; .5 SOLUTION RESPIRATORY (INHALATION) at 01:15

## 2025-08-02 RX ADMIN — HEPARIN SODIUM 5000 UNITS: 5000 INJECTION INTRAVENOUS; SUBCUTANEOUS at 13:38

## 2025-08-02 RX ADMIN — LISINOPRIL: 20 TABLET ORAL at 08:19

## 2025-08-02 RX ADMIN — MAGNESIUM OXIDE 400 MG (241.3 MG MAGNESIUM) TABLET 1 TABLET: TABLET at 08:19

## 2025-08-02 RX ADMIN — Medication 3 MG: at 20:26

## 2025-08-02 RX ADMIN — INSULIN LISPRO 2 UNITS: 100 INJECTION, SOLUTION INTRAVENOUS; SUBCUTANEOUS at 20:30

## 2025-08-02 RX ADMIN — INSULIN LISPRO 1 UNITS: 100 INJECTION, SOLUTION INTRAVENOUS; SUBCUTANEOUS at 16:35

## 2025-08-02 RX ADMIN — SERTRALINE HYDROCHLORIDE 100 MG: 50 TABLET, FILM COATED ORAL at 08:19

## 2025-08-02 RX ADMIN — METOPROLOL SUCCINATE 25 MG: 25 TABLET, EXTENDED RELEASE ORAL at 08:19

## 2025-08-02 RX ADMIN — HYDROXYZINE HYDROCHLORIDE 25 MG: 25 TABLET ORAL at 08:19

## 2025-08-02 ASSESSMENT — COGNITIVE AND FUNCTIONAL STATUS - GENERAL
MOBILITY SCORE: 23
DAILY ACTIVITIY SCORE: 24
DAILY ACTIVITIY SCORE: 24
MOBILITY SCORE: 23
CLIMB 3 TO 5 STEPS WITH RAILING: A LITTLE
CLIMB 3 TO 5 STEPS WITH RAILING: A LITTLE

## 2025-08-02 ASSESSMENT — PAIN - FUNCTIONAL ASSESSMENT
PAIN_FUNCTIONAL_ASSESSMENT: 0-10
PAIN_FUNCTIONAL_ASSESSMENT: 0-10

## 2025-08-02 ASSESSMENT — PAIN SCALES - GENERAL
PAINLEVEL_OUTOF10: 0 - NO PAIN

## 2025-08-02 NOTE — PROGRESS NOTES
Medical Group Progress Note  ASSESSMENT & PLAN:        Acute COPD suspicion  Acute respiratory failure with hypoxia  - Continues to be short of breath, has a nonproductive cough  - Continuing Solu-Medrol IV, bronchodilators, doxycycline as ordered  - Currently on 2 L nasal cannula (uses oxygen nocturnally at home and as needed)  - Home oxygen evaluation on day of discharge    BELLE, resolved  - Creatinine 0.88 today, 1.86 on admission    Hyperlipidemia  Essential hypertension  CAD  Type II DM  GERD  Hypothyroidism  Generalized anxiety  - A1c 5.7% this admission  - Continue home medications as reconciled    Leukocytosis, reactive to above    Lactic acidosis, resolved    VTE Prophylaxis: Heparin subcutaneous    Disposition/Daily update: Assumed care from previous provider this morning.  Respiratory standpoint, 50% back to baseline.  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  Cough and respiratory status improved but still short of breath.  Remains well on 2 to 3 L nasal cannula, home oxygen evaluation prior to discharge.  Possible discharge in 24 hours.  Spoke with stepfather at bedside.      ---Of note, this documentation is completed using the Dragon Dictation system (voice recognition software). There may be spelling and/or grammatical errors that were not corrected prior to final submission.---      Tam Tamayo MD    SUBJECTIVE     Patient was seen and examined bedside this morning.  States that her shortness of is approximately 50% improved from arrival a few days ago.  Still not at baseline however.    OBJECTIVE:     Last Recorded Vitals:  Vitals:    08/02/25 0404 08/02/25 0600 08/02/25 0653 08/02/25 0748   BP: 130/69   126/58   BP Location: Left arm   Right arm   Patient Position: Lying   Sitting   Pulse: 80   84   Resp: 16   18   Temp: 37.1 °C (98.8 °F)   36.6 °C (97.9 °F)   TempSrc:  Temporal   Temporal   SpO2: 93% 96% 94% 92%   Weight:       Height:         Last I/O:  I/O last 3 completed shifts:  In: 720 (8.9 mL/kg) [P.O.:720]  Out: - (0 mL/kg)   Weight: 80.8 kg     Physical Exam  Vitals reviewed.   Constitutional:       General: She is not in acute distress.     Appearance: Normal appearance. She is not toxic-appearing.   HENT:      Head: Normocephalic and atraumatic.     Eyes:      Extraocular Movements: Extraocular movements intact.      Conjunctiva/sclera: Conjunctivae normal.       Cardiovascular:      Rate and Rhythm: Normal rate and regular rhythm.      Pulses: Normal pulses.      Heart sounds: Normal heart sounds.   Pulmonary:      Effort: Pulmonary effort is normal. No respiratory distress.      Breath sounds: Wheezing present.      Comments: No acute respiratory distress.  Breath sounds significantly diminished to auscultation bilaterally with scattered end expiratory wheezes.  Abdominal:      General: Bowel sounds are normal. There is no distension.      Palpations: Abdomen is soft.      Tenderness: There is no abdominal tenderness. There is no guarding.     Musculoskeletal:         General: Normal range of motion.      Cervical back: Normal range of motion and neck supple.     Neurological:      General: No focal deficit present.      Mental Status: She is alert and oriented to person, place, and time. Mental status is at baseline.     Psychiatric:         Mood and Affect: Mood normal.         Behavior: Behavior normal.         Thought Content: Thought content normal.       Inpatient Medications:  Scheduled Medications[1]    PRN Medications  PRN Medications[2]    Continuous Medications:  Continuous Medications[3]  LABS AND IMAGING:     Labs:  Results from last 7 days   Lab Units 08/02/25  0712 08/01/25  0608 07/31/25  0547   WBC AUTO x10*3/uL 14.3* 9.5 13.9*   RBC AUTO x10*6/uL 4.31 4.01 3.89*   HEMOGLOBIN g/dL 12.9 12.2 11.8*   HEMATOCRIT % 38.9 37.2 36.2   MCV fL 90 93 93   MCH  pg 29.9 30.4 30.3   MCHC g/dL 33.2 32.8 32.6   RDW % 12.8 12.9 13.2   PLATELETS AUTO x10*3/uL 379 303 329     Results from last 7 days   Lab Units 08/02/25  0712 08/01/25  0608 07/31/25  0547 07/30/25  0544 07/29/25  1522   SODIUM mmol/L 137 139 141   < > 135*   POTASSIUM mmol/L 4.3 4.9 4.7   < > 4.3   CHLORIDE mmol/L 99 102 106   < > 99   CO2 mmol/L 31 32 28   < > 23   BUN mg/dL 32* 28* 30*   < > 41*   CREATININE mg/dL 0.88 0.90 0.84   < > 1.86*   GLUCOSE mg/dL 154* 196* 89   < > 201*   PROTEIN TOTAL g/dL  --   --   --   --  7.4   CALCIUM mg/dL 9.6 9.5 9.2   < > 9.2   BILIRUBIN TOTAL mg/dL  --   --   --   --  0.4   ALK PHOS U/L  --   --   --   --  108   AST U/L  --   --   --   --  18   ALT U/L  --   --   --   --  17    < > = values in this interval not displayed.     Results from last 7 days   Lab Units 07/29/25  1522   MAGNESIUM mg/dL 1.98     Results from last 7 days   Lab Units 07/29/25  1640 07/29/25  1522   TROPHS ng/L 3 3     Imaging:  ECG 12 lead  Normal sinus rhythm  Normal ECG  When compared with ECG of 29-JUL-2025 15:18, (unconfirmed)  No significant change was found  See ED provider note for full interpretation and clinical correlation  Confirmed by Tana Ferris ([id number]) on 7/30/2025 11:25:29 AM          [1] aspirin, 81 mg, oral, Daily  atorvastatin, 80 mg, oral, Nightly  dextromethorphan polistirex ER, 30 mg, oral, q12h JCARLOS  doxycylcine, 100 mg, oral, q12h JCARLOS  guaiFENesin, 600 mg, oral, BID  heparin (porcine), 5,000 Units, subcutaneous, q8h  insulin lispro, 0-5 Units, subcutaneous, Before meals & nightly  ipratropium-albuteroL, 3 mL, nebulization, q6h  levothyroxine, 88 mcg, oral, Daily  lisinopril 20 mg, hydroCHLOROthiazide 25 mg for Zestoretic/Prinizide, , oral, Daily  magnesium oxide, 400 mg of magnesium oxide, oral, Daily  methylPREDNISolone sodium succinate (PF), 40 mg, intravenous, q8h  metoprolol succinate XL, 25 mg, oral, Daily  pantoprazole, 40 mg, oral, Daily before  breakfast  polyethylene glycol, 17 g, oral, Daily  ranolazine, 500 mg, oral, BID  sertraline, 100 mg, oral, Daily    [2] PRN medications: acetaminophen **OR** acetaminophen **OR** acetaminophen, benzonatate, dextrose, dextrose, glucagon, glucagon, hydrOXYzine HCL, ipratropium-albuteroL, melatonin, ondansetron **OR** ondansetron  [3] oxygen, 1 Dose

## 2025-08-02 NOTE — CARE PLAN
The patient's goals for the shift include feel better    The clinical goals for the shift include breathe with ease    Over the shift, the patient did not make progress toward the following goals. Barriers to progression include none. Recommendations to address these barriers include respiratory treatments as ordered.

## 2025-08-02 NOTE — PROGRESS NOTES
Lauren Thompson is a 60 y.o. female on day 4 of admission presenting with COPD exacerbation (Multi).      SUBJECTIVE      Patient was seen and examined bedside this afternoon.  She reports improved dyspnea since admission though not at back to baseline.     OBJECTIVE:      Last Recorded Vitals:  Vitals          Vitals:     08/02/25 0404 08/02/25 0600 08/02/25 0653 08/02/25 0748   BP: 130/69     126/58   BP Location: Left arm     Right arm   Patient Position: Lying     Sitting   Pulse: 80     84   Resp: 16     18   Temp: 37.1 °C (98.8 °F)     36.6 °C (97.9 °F)   TempSrc: Temporal     Temporal   SpO2: 93% 96% 94% 92%   Weight:           Height:                 Last I/O:  I/O last 3 completed shifts:  In: 720 (8.9 mL/kg) [P.O.:720]  Out: - (0 mL/kg)   Weight: 80.8 kg      Physical Exam  Vitals reviewed.   Constitutional:       General: She is not in acute distress.     Appearance: Normal appearance. She is not toxic-appearing.   HENT:      Head: Normocephalic and atraumatic.      Eyes:      Extraocular Movements: Extraocular movements intact.      Conjunctiva/sclera: Conjunctivae normal.         Cardiovascular:      Rate and Rhythm: Normal rate and regular rhythm.      Pulses: Normal pulses.      Heart sounds: Normal heart sounds.   Pulmonary:      Effort: Pulmonary effort is normal. No respiratory distress.      Breath sounds: Wheezing present.      Comments: No acute respiratory distress.  Breath sounds significantly diminished to auscultation bilaterally with scattered end expiratory wheezes.  Abdominal:      General: Bowel sounds are normal. There is no distension.      Palpations: Abdomen is soft.      Tenderness: There is no abdominal tenderness. There is no guarding.      Musculoskeletal:         General: Normal range of motion.      Cervical back: Normal range of motion and neck supple.      Neurological:      General: No focal deficit present.      Mental Status: She is alert and oriented to person, place, and  time. Mental status is at baseline.      Psychiatric:         Mood and Affect: Mood normal.         Behavior: Behavior normal.         Thought Content: Thought content normal.        Inpatient Medications:  [Scheduled Medications]    [Scheduled Medications]  aspirin, 81 mg, oral, Daily  atorvastatin, 80 mg, oral, Nightly  dextromethorphan polistirex ER, 30 mg, oral, q12h JCARLOS  doxycylcine, 100 mg, oral, q12h JCARLOS  guaiFENesin, 600 mg, oral, BID  heparin (porcine), 5,000 Units, subcutaneous, q8h  insulin lispro, 0-5 Units, subcutaneous, Before meals & nightly  ipratropium-albuteroL, 3 mL, nebulization, q6h  levothyroxine, 88 mcg, oral, Daily  lisinopril 20 mg, hydroCHLOROthiazide 25 mg for Zestoretic/Prinizide, , oral, Daily  magnesium oxide, 400 mg of magnesium oxide, oral, Daily  methylPREDNISolone sodium succinate (PF), 40 mg, intravenous, q8h  metoprolol succinate XL, 25 mg, oral, Daily  pantoprazole, 40 mg, oral, Daily before breakfast  polyethylene glycol, 17 g, oral, Daily  ranolazine, 500 mg, oral, BID  sertraline, 100 mg, oral, Daily        PRN Medications  [PRN Medications]    [PRN Medications]  PRN medications: acetaminophen **OR** acetaminophen **OR** acetaminophen, benzonatate, dextrose, dextrose, glucagon, glucagon, hydrOXYzine HCL, ipratropium-albuteroL, melatonin, ondansetron **OR** ondansetron     Continuous Medications:  [Continuous Medications]    [Continuous Medications]  oxygen, 1 Dose     LABS AND IMAGING:      Labs:         Results from last 7 days   Lab Units 08/02/25  0712 08/01/25  0608 07/31/25  0547   WBC AUTO x10*3/uL 14.3* 9.5 13.9*   RBC AUTO x10*6/uL 4.31 4.01 3.89*   HEMOGLOBIN g/dL 12.9 12.2 11.8*   HEMATOCRIT % 38.9 37.2 36.2   MCV fL 90 93 93   MCH pg 29.9 30.4 30.3   MCHC g/dL 33.2 32.8 32.6   RDW % 12.8 12.9 13.2   PLATELETS AUTO x10*3/uL 379 303 329               Results from last 7 days   Lab Units 08/02/25  0712 08/01/25  0608 07/31/25  0547 07/30/25  0544 07/29/25  1522    SODIUM mmol/L 137 139 141   < > 135*   POTASSIUM mmol/L 4.3 4.9 4.7   < > 4.3   CHLORIDE mmol/L 99 102 106   < > 99   CO2 mmol/L 31 32 28   < > 23   BUN mg/dL 32* 28* 30*   < > 41*   CREATININE mg/dL 0.88 0.90 0.84   < > 1.86*   GLUCOSE mg/dL 154* 196* 89   < > 201*   PROTEIN TOTAL g/dL  --   --   --   --  7.4   CALCIUM mg/dL 9.6 9.5 9.2   < > 9.2   BILIRUBIN TOTAL mg/dL  --   --   --   --  0.4   ALK PHOS U/L  --   --   --   --  108   AST U/L  --   --   --   --  18   ALT U/L  --   --   --   --  17    < > = values in this interval not displayed.           Results from last 7 days   Lab Units 07/29/25  1522   MAGNESIUM mg/dL 1.98            Results from last 7 days   Lab Units 07/29/25  1640 07/29/25  1522   TROPHS ng/L 3 3      Imaging:  ECG 12 lead  Normal sinus rhythm  Normal ECG  When compared with ECG of 29-JUL-2025 15:18, (unconfirmed)  No significant change was found  ASSESSMENT & PLAN:      Acute COPD suspicion  Acute respiratory failure with hypoxia  - Continues to be short of breath, has a nonproductive cough  - Continuing Solu-Medrol IV, bronchodilators, doxycycline as ordered  - Currently on 2 L nasal cannula (uses oxygen nocturnally at home and as needed)  - Home oxygen reevaluation on day of discharge     BELLE, resolved  - Creatinine 0.88 today, 1.86 on admission     Hyperlipidemia  Essential hypertension  CAD  Type II DM  GERD  Hypothyroidism  Generalized anxiety  - A1c 5.7% this admission  - Continue home medications as reconciled     Leukocytosis, reactive to above     Lactic acidosis, resolved     VTE Prophylaxis: Heparin subcutaneous    Pulmonary comments:-Patient is doing much better but not quite at baseline.  She is still somewhat short of breath.  Remains on oxygen 3 L nasal cannula.  She will need home O2 reevaluation at discharge.  Possible discharge tomorrow if doing well.  Patient to follow-up with Dr. Cordoba her pulmonologist after discharge.    I spent 25 minutes in the professional and  overall care of this patient.      Lit Loya MD

## 2025-08-02 NOTE — CARE PLAN
Problem: Pain - Adult  Goal: Verbalizes/displays adequate comfort level or baseline comfort level  Outcome: Progressing     Problem: Safety - Adult  Goal: Free from fall injury  Outcome: Progressing     The clinical goals for the shift include remain free from signs of respiratory distress throughout shift

## 2025-08-03 VITALS
RESPIRATION RATE: 19 BRPM | SYSTOLIC BLOOD PRESSURE: 109 MMHG | OXYGEN SATURATION: 95 % | TEMPERATURE: 96.6 F | DIASTOLIC BLOOD PRESSURE: 54 MMHG | HEART RATE: 81 BPM | WEIGHT: 178.13 LBS | BODY MASS INDEX: 37.39 KG/M2 | HEIGHT: 58 IN

## 2025-08-03 LAB
ANION GAP SERPL CALC-SCNC: 11 MMOL/L (ref 10–20)
BASOPHILS # BLD AUTO: 0.08 X10*3/UL (ref 0–0.1)
BASOPHILS NFR BLD AUTO: 0.5 %
BUN SERPL-MCNC: 37 MG/DL (ref 6–23)
CALCIUM SERPL-MCNC: 9.2 MG/DL (ref 8.6–10.3)
CHLORIDE SERPL-SCNC: 99 MMOL/L (ref 98–107)
CO2 SERPL-SCNC: 32 MMOL/L (ref 21–32)
CREAT SERPL-MCNC: 0.93 MG/DL (ref 0.5–1.05)
EGFRCR SERPLBLD CKD-EPI 2021: 70 ML/MIN/1.73M*2
EOSINOPHIL # BLD AUTO: 0 X10*3/UL (ref 0–0.7)
EOSINOPHIL NFR BLD AUTO: 0 %
ERYTHROCYTE [DISTWIDTH] IN BLOOD BY AUTOMATED COUNT: 12.9 % (ref 11.5–14.5)
GLUCOSE BLD MANUAL STRIP-MCNC: 184 MG/DL (ref 74–99)
GLUCOSE BLD MANUAL STRIP-MCNC: 234 MG/DL (ref 74–99)
GLUCOSE BLD MANUAL STRIP-MCNC: 316 MG/DL (ref 74–99)
GLUCOSE SERPL-MCNC: 184 MG/DL (ref 74–99)
HCT VFR BLD AUTO: 38.4 % (ref 36–46)
HGB BLD-MCNC: 12.6 G/DL (ref 12–16)
HOLD SPECIMEN: NORMAL
IMM GRANULOCYTES # BLD AUTO: 0.77 X10*3/UL (ref 0–0.7)
IMM GRANULOCYTES NFR BLD AUTO: 5 % (ref 0–0.9)
LYMPHOCYTES # BLD AUTO: 2.46 X10*3/UL (ref 1.2–4.8)
LYMPHOCYTES NFR BLD AUTO: 15.9 %
MCH RBC QN AUTO: 29.9 PG (ref 26–34)
MCHC RBC AUTO-ENTMCNC: 32.8 G/DL (ref 32–36)
MCV RBC AUTO: 91 FL (ref 80–100)
MONOCYTES # BLD AUTO: 1.02 X10*3/UL (ref 0.1–1)
MONOCYTES NFR BLD AUTO: 6.6 %
NEUTROPHILS # BLD AUTO: 11.11 X10*3/UL (ref 1.2–7.7)
NEUTROPHILS NFR BLD AUTO: 72 %
NRBC BLD-RTO: 0 /100 WBCS (ref 0–0)
PLATELET # BLD AUTO: 360 X10*3/UL (ref 150–450)
POTASSIUM SERPL-SCNC: 4.1 MMOL/L (ref 3.5–5.3)
RBC # BLD AUTO: 4.21 X10*6/UL (ref 4–5.2)
SODIUM SERPL-SCNC: 138 MMOL/L (ref 136–145)
WBC # BLD AUTO: 15.4 X10*3/UL (ref 4.4–11.3)

## 2025-08-03 PROCEDURE — 2500000004 HC RX 250 GENERAL PHARMACY W/ HCPCS (ALT 636 FOR OP/ED): Performed by: STUDENT IN AN ORGANIZED HEALTH CARE EDUCATION/TRAINING PROGRAM

## 2025-08-03 PROCEDURE — 2500000002 HC RX 250 W HCPCS SELF ADMINISTERED DRUGS (ALT 637 FOR MEDICARE OP, ALT 636 FOR OP/ED): Performed by: STUDENT IN AN ORGANIZED HEALTH CARE EDUCATION/TRAINING PROGRAM

## 2025-08-03 PROCEDURE — 36415 COLL VENOUS BLD VENIPUNCTURE: CPT | Performed by: STUDENT IN AN ORGANIZED HEALTH CARE EDUCATION/TRAINING PROGRAM

## 2025-08-03 PROCEDURE — 85025 COMPLETE CBC W/AUTO DIFF WBC: CPT | Performed by: STUDENT IN AN ORGANIZED HEALTH CARE EDUCATION/TRAINING PROGRAM

## 2025-08-03 PROCEDURE — 2500000004 HC RX 250 GENERAL PHARMACY W/ HCPCS (ALT 636 FOR OP/ED): Mod: JZ | Performed by: STUDENT IN AN ORGANIZED HEALTH CARE EDUCATION/TRAINING PROGRAM

## 2025-08-03 PROCEDURE — 80048 BASIC METABOLIC PNL TOTAL CA: CPT | Performed by: STUDENT IN AN ORGANIZED HEALTH CARE EDUCATION/TRAINING PROGRAM

## 2025-08-03 PROCEDURE — 2500000001 HC RX 250 WO HCPCS SELF ADMINISTERED DRUGS (ALT 637 FOR MEDICARE OP): Performed by: STUDENT IN AN ORGANIZED HEALTH CARE EDUCATION/TRAINING PROGRAM

## 2025-08-03 PROCEDURE — 80051 ELECTROLYTE PANEL: CPT | Performed by: STUDENT IN AN ORGANIZED HEALTH CARE EDUCATION/TRAINING PROGRAM

## 2025-08-03 PROCEDURE — 99232 SBSQ HOSP IP/OBS MODERATE 35: CPT | Performed by: STUDENT IN AN ORGANIZED HEALTH CARE EDUCATION/TRAINING PROGRAM

## 2025-08-03 PROCEDURE — 82947 ASSAY GLUCOSE BLOOD QUANT: CPT

## 2025-08-03 PROCEDURE — 2500000001 HC RX 250 WO HCPCS SELF ADMINISTERED DRUGS (ALT 637 FOR MEDICARE OP)

## 2025-08-03 PROCEDURE — 2500000005 HC RX 250 GENERAL PHARMACY W/O HCPCS: Performed by: STUDENT IN AN ORGANIZED HEALTH CARE EDUCATION/TRAINING PROGRAM

## 2025-08-03 PROCEDURE — 2500000001 HC RX 250 WO HCPCS SELF ADMINISTERED DRUGS (ALT 637 FOR MEDICARE OP): Performed by: NURSE PRACTITIONER

## 2025-08-03 PROCEDURE — 94640 AIRWAY INHALATION TREATMENT: CPT

## 2025-08-03 PROCEDURE — 2500000004 HC RX 250 GENERAL PHARMACY W/ HCPCS (ALT 636 FOR OP/ED): Mod: JZ

## 2025-08-03 PROCEDURE — 1210000001 HC SEMI-PRIVATE ROOM DAILY

## 2025-08-03 RX ORDER — ARFORMOTEROL TARTRATE 15 UG/2ML
15 SOLUTION RESPIRATORY (INHALATION)
Status: DISCONTINUED | OUTPATIENT
Start: 2025-08-03 | End: 2025-08-03

## 2025-08-03 RX ORDER — BUDESONIDE 0.5 MG/2ML
0.5 INHALANT ORAL
Status: DISPENSED | OUTPATIENT
Start: 2025-08-03

## 2025-08-03 RX ORDER — FORMOTEROL FUMARATE 20 UG/2ML
20 SOLUTION RESPIRATORY (INHALATION)
Status: DISPENSED | OUTPATIENT
Start: 2025-08-03

## 2025-08-03 RX ADMIN — HYDROXYZINE HYDROCHLORIDE 25 MG: 25 TABLET ORAL at 13:30

## 2025-08-03 RX ADMIN — ASPIRIN 81 MG: 81 TABLET, COATED ORAL at 10:25

## 2025-08-03 RX ADMIN — METHYLPREDNISOLONE SODIUM SUCCINATE 40 MG: 40 INJECTION, POWDER, FOR SOLUTION INTRAMUSCULAR; INTRAVENOUS at 13:30

## 2025-08-03 RX ADMIN — IPRATROPIUM BROMIDE AND ALBUTEROL SULFATE 3 ML: 2.5; .5 SOLUTION RESPIRATORY (INHALATION) at 12:37

## 2025-08-03 RX ADMIN — DEXTROMETHORPHAN POLISTIREX 30 MG: 30 SUSPENSION ORAL at 21:51

## 2025-08-03 RX ADMIN — DOXYCYCLINE HYCLATE 100 MG: 100 TABLET, FILM COATED ORAL at 21:51

## 2025-08-03 RX ADMIN — INSULIN LISPRO 1 UNITS: 100 INJECTION, SOLUTION INTRAVENOUS; SUBCUTANEOUS at 06:33

## 2025-08-03 RX ADMIN — Medication: at 08:00

## 2025-08-03 RX ADMIN — RANOLAZINE 500 MG: 500 TABLET, FILM COATED, EXTENDED RELEASE ORAL at 21:51

## 2025-08-03 RX ADMIN — INSULIN LISPRO 2 UNITS: 100 INJECTION, SOLUTION INTRAVENOUS; SUBCUTANEOUS at 11:46

## 2025-08-03 RX ADMIN — PANTOPRAZOLE SODIUM 40 MG: 40 TABLET, DELAYED RELEASE ORAL at 06:33

## 2025-08-03 RX ADMIN — MAGNESIUM OXIDE 400 MG (241.3 MG MAGNESIUM) TABLET 1 TABLET: TABLET at 10:24

## 2025-08-03 RX ADMIN — LEVOTHYROXINE SODIUM 88 MCG: 0.09 TABLET ORAL at 06:33

## 2025-08-03 RX ADMIN — INSULIN LISPRO 4 UNITS: 100 INJECTION, SOLUTION INTRAVENOUS; SUBCUTANEOUS at 17:17

## 2025-08-03 RX ADMIN — LISINOPRIL: 20 TABLET ORAL at 10:25

## 2025-08-03 RX ADMIN — ATORVASTATIN CALCIUM 80 MG: 80 TABLET, FILM COATED ORAL at 21:51

## 2025-08-03 RX ADMIN — Medication 1 DOSE: at 21:10

## 2025-08-03 RX ADMIN — HEPARIN SODIUM 5000 UNITS: 5000 INJECTION INTRAVENOUS; SUBCUTANEOUS at 06:33

## 2025-08-03 RX ADMIN — RANOLAZINE 500 MG: 500 TABLET, FILM COATED, EXTENDED RELEASE ORAL at 10:27

## 2025-08-03 RX ADMIN — FORMOTEROL FUMARATE 20 MCG: 20 SOLUTION RESPIRATORY (INHALATION) at 18:03

## 2025-08-03 RX ADMIN — Medication: at 06:52

## 2025-08-03 RX ADMIN — IPRATROPIUM BROMIDE AND ALBUTEROL SULFATE 3 ML: 2.5; .5 SOLUTION RESPIRATORY (INHALATION) at 01:34

## 2025-08-03 RX ADMIN — METHYLPREDNISOLONE SODIUM SUCCINATE 40 MG: 40 INJECTION, POWDER, FOR SOLUTION INTRAMUSCULAR; INTRAVENOUS at 21:51

## 2025-08-03 RX ADMIN — HEPARIN SODIUM 5000 UNITS: 5000 INJECTION INTRAVENOUS; SUBCUTANEOUS at 13:30

## 2025-08-03 RX ADMIN — DOXYCYCLINE HYCLATE 100 MG: 100 TABLET, FILM COATED ORAL at 10:24

## 2025-08-03 RX ADMIN — METHYLPREDNISOLONE SODIUM SUCCINATE 40 MG: 40 INJECTION, POWDER, FOR SOLUTION INTRAMUSCULAR; INTRAVENOUS at 06:33

## 2025-08-03 RX ADMIN — GUAIFENESIN 600 MG: 600 TABLET, EXTENDED RELEASE ORAL at 10:25

## 2025-08-03 RX ADMIN — DEXTROMETHORPHAN POLISTIREX 30 MG: 30 SUSPENSION ORAL at 10:23

## 2025-08-03 RX ADMIN — GUAIFENESIN 600 MG: 600 TABLET, EXTENDED RELEASE ORAL at 21:51

## 2025-08-03 RX ADMIN — IPRATROPIUM BROMIDE AND ALBUTEROL SULFATE 3 ML: 2.5; .5 SOLUTION RESPIRATORY (INHALATION) at 06:52

## 2025-08-03 RX ADMIN — HEPARIN SODIUM 5000 UNITS: 5000 INJECTION INTRAVENOUS; SUBCUTANEOUS at 21:52

## 2025-08-03 RX ADMIN — SERTRALINE HYDROCHLORIDE 100 MG: 50 TABLET, FILM COATED ORAL at 10:25

## 2025-08-03 RX ADMIN — INSULIN LISPRO 3 UNITS: 100 INJECTION, SOLUTION INTRAVENOUS; SUBCUTANEOUS at 21:51

## 2025-08-03 RX ADMIN — IPRATROPIUM BROMIDE AND ALBUTEROL SULFATE 3 ML: 2.5; .5 SOLUTION RESPIRATORY (INHALATION) at 18:03

## 2025-08-03 RX ADMIN — BUDESONIDE 0.5 MG: 0.5 INHALANT RESPIRATORY (INHALATION) at 18:03

## 2025-08-03 RX ADMIN — METOPROLOL SUCCINATE 25 MG: 25 TABLET, EXTENDED RELEASE ORAL at 10:27

## 2025-08-03 ASSESSMENT — COGNITIVE AND FUNCTIONAL STATUS - GENERAL
MOBILITY SCORE: 24
DAILY ACTIVITIY SCORE: 24
DAILY ACTIVITIY SCORE: 24
MOBILITY SCORE: 24

## 2025-08-03 ASSESSMENT — PAIN - FUNCTIONAL ASSESSMENT
PAIN_FUNCTIONAL_ASSESSMENT: 0-10
PAIN_FUNCTIONAL_ASSESSMENT: 0-10

## 2025-08-03 ASSESSMENT — PAIN SCALES - GENERAL
PAINLEVEL_OUTOF10: 0 - NO PAIN
PAINLEVEL_OUTOF10: 0 - NO PAIN

## 2025-08-03 NOTE — PROGRESS NOTES
Medical Group Progress Note  ASSESSMENT & PLAN:        Acute COPD exacerbation  Acute respiratory failure with hypoxia  - Continues to be short of breath, has a nonproductive cough  - Continuing Solu-Medrol IV, doxycycline for 5 days (w/ end dates) and bronchodilators as ordered  - Adding LABA/ICS nebs today 8/3  - Currently on 2 L nasal cannula (uses oxygen nocturnally at home and as needed)  - Home oxygen evaluation on day of discharge    BELLE, resolved  - Creatinine 0.93 today, 1.86 on admission    Hyperlipidemia  Essential hypertension  CAD  Type II DM  GERD  Hypothyroidism  Generalized anxiety  - A1c 5.7% this admission  - Continue home medications as reconciled    Leukocytosis, reactive to above    Lactic acidosis, resolved    VTE Prophylaxis: Heparin subcutaneous    Disposition/Daily update: Still short of breath, not ready for discharge but is improving day today.  Advise increase mobility.  I did stop dates for Solu-Medrol doxycycline for completion of 5 days otherwise initiating LABA/ICS nebs today with budesonide/arformoterol.  Plan for discharge home hopefully in 24 hours.  Will need home O2 evaluation on day of discharge.      ---Of note, this documentation is completed using the Dragon Dictation system (voice recognition software). There may be spelling and/or grammatical errors that were not corrected prior to final submission.---      Tam Tamayo MD    SUBJECTIVE     Patient was seen at bedside this morning.  Continues to have a productive cough.  Did have some right lower quadrant pain especially with coughing.  States not ready for discharge today yet.    OBJECTIVE:     Last Recorded Vitals:  Vitals:    08/02/25 1524 08/02/25 1940 08/03/25 0652 08/03/25 0739   BP: 133/63 117/59  103/75   BP Location: Right arm Left arm  Left arm   Patient Position: Lying Lying  Lying   Pulse: 83 84  (!) 114   Resp: 16 16  18   Temp: 36.7 °C (98.1 °F) 35.5 °C (95.9 °F)  36.1 °C (97 °F)   TempSrc: Temporal  Temporal  Temporal   SpO2: 94% 94% 92% 92%   Weight:       Height:         Last I/O:  No intake/output data recorded.    Physical Exam  Vitals reviewed.   Constitutional:       General: She is not in acute distress.     Appearance: Normal appearance. She is not toxic-appearing.   HENT:      Head: Normocephalic and atraumatic.     Eyes:      Extraocular Movements: Extraocular movements intact.      Conjunctiva/sclera: Conjunctivae normal.       Cardiovascular:      Rate and Rhythm: Normal rate and regular rhythm.      Pulses: Normal pulses.      Heart sounds: Normal heart sounds.   Pulmonary:      Effort: Pulmonary effort is normal. No respiratory distress.      Breath sounds: Wheezing present.      Comments: No acute respiratory distress.  Breath sounds significantly diminished to auscultation bilaterally with scattered end expiratory wheezes.  Abdominal:      General: Bowel sounds are normal. There is no distension.      Palpations: Abdomen is soft.      Tenderness: There is no abdominal tenderness. There is no guarding.     Musculoskeletal:         General: Normal range of motion.      Cervical back: Normal range of motion and neck supple.     Neurological:      General: No focal deficit present.      Mental Status: She is alert and oriented to person, place, and time. Mental status is at baseline.     Psychiatric:         Mood and Affect: Mood normal.         Behavior: Behavior normal.         Thought Content: Thought content normal.       Inpatient Medications:  Scheduled Medications[1]    PRN Medications  PRN Medications[2]    Continuous Medications:  Continuous Medications[3]  LABS AND IMAGING:     Labs:  Results from last 7 days   Lab Units 08/03/25  0632 08/02/25  0712 08/01/25  0608   WBC AUTO x10*3/uL 15.4* 14.3* 9.5   RBC AUTO x10*6/uL 4.21 4.31 4.01   HEMOGLOBIN g/dL 12.6 12.9 12.2   HEMATOCRIT % 38.4 38.9 37.2   MCV fL 91 90 93   MCH pg 29.9 29.9 30.4   MCHC g/dL 32.8 33.2 32.8   RDW % 12.9 12.8 12.9    PLATELETS AUTO x10*3/uL 360 379 303     Results from last 7 days   Lab Units 08/03/25  0632 08/02/25  0712 08/01/25  0608 07/30/25  0544 07/29/25  1522   SODIUM mmol/L 138 137 139   < > 135*   POTASSIUM mmol/L 4.1 4.3 4.9   < > 4.3   CHLORIDE mmol/L 99 99 102   < > 99   CO2 mmol/L 32 31 32   < > 23   BUN mg/dL 37* 32* 28*   < > 41*   CREATININE mg/dL 0.93 0.88 0.90   < > 1.86*   GLUCOSE mg/dL 184* 154* 196*   < > 201*   PROTEIN TOTAL g/dL  --   --   --   --  7.4   CALCIUM mg/dL 9.2 9.6 9.5   < > 9.2   BILIRUBIN TOTAL mg/dL  --   --   --   --  0.4   ALK PHOS U/L  --   --   --   --  108   AST U/L  --   --   --   --  18   ALT U/L  --   --   --   --  17    < > = values in this interval not displayed.     Results from last 7 days   Lab Units 07/29/25  1522   MAGNESIUM mg/dL 1.98     Results from last 7 days   Lab Units 07/29/25  1640 07/29/25  1522   TROPHS ng/L 3 3     Imaging:  ECG 12 lead  Normal sinus rhythm  Normal ECG  When compared with ECG of 29-JUL-2025 15:18, (unconfirmed)  No significant change was found  See ED provider note for full interpretation and clinical correlation  Confirmed by Tana Ferris ([id number]) on 7/30/2025 11:25:29 AM            [1] aspirin, 81 mg, oral, Daily  atorvastatin, 80 mg, oral, Nightly  dextromethorphan polistirex ER, 30 mg, oral, q12h JCARLOS  doxycylcine, 100 mg, oral, q12h JCARLOS  guaiFENesin, 600 mg, oral, BID  heparin (porcine), 5,000 Units, subcutaneous, q8h  insulin lispro, 0-5 Units, subcutaneous, Before meals & nightly  ipratropium-albuteroL, 3 mL, nebulization, q6h  levothyroxine, 88 mcg, oral, Daily  lisinopril 20 mg, hydroCHLOROthiazide 25 mg for Zestoretic/Prinizide, , oral, Daily  magnesium oxide, 400 mg of magnesium oxide, oral, Daily  methylPREDNISolone sodium succinate (PF), 40 mg, intravenous, q8h  metoprolol succinate XL, 25 mg, oral, Daily  pantoprazole, 40 mg, oral, Daily before breakfast  polyethylene glycol, 17 g, oral, Daily  ranolazine, 500 mg, oral,  BID  sertraline, 100 mg, oral, Daily     [2] PRN medications: acetaminophen **OR** acetaminophen **OR** acetaminophen, benzonatate, dextrose, dextrose, glucagon, glucagon, hydrOXYzine HCL, ipratropium-albuteroL, melatonin, ondansetron **OR** ondansetron  [3] oxygen, 1 Dose

## 2025-08-03 NOTE — PROGRESS NOTES
Lauren Thompson is a 60 y.o. female on day 5 of admission presenting with COPD exacerbation (Multi).  SUBJECTIVE     Patient was seen this PM. Continues to have a productive cough. Did have some right lower quadrant pain especially with coughing.  Some mild bruising over right lower abdominal quadrant likely from Lovenox shots.  States not ready for discharge today yet.     OBJECTIVE:     Last Recorded Vitals: Vitals Vitals: 08/02/25 1524 08/02/25 1940 08/03/25 0652 08/03/25 0739 BP: 133/63 117/59 103/75 BP Location: Right arm Left arm Left arm Patient Position: Lying Lying Lying Pulse: 83 84 (!) 114 Resp: 16 16 18 Temp: 36.7 °C (98.1 °F) 35.5 °C (95.9 °F) 36.1 °C (97 °F) TempSrc: Temporal Temporal Temporal SpO2: 94% 94% 92% 92% Weight:     Height:      Last I/O: No intake/output data recorded.     Physical Exam Vitals reviewed. Constitutional:      General: She is not in acute distress. Appearance: Normal appearance. She is not toxic-appearing. HENT: Head: Normocephalic and atraumatic.      Eyes: Extraocular Movements: Extraocular movements intact. Conjunctiva/sclera: Conjunctivae normal.      Cardiovascular: Rate and Rhythm: Normal rate and regular rhythm. Pulses: Normal pulses. Heart sounds: Normal heart sounds. Pulmonary: Effort: Pulmonary effort is normal. No respiratory distress. Breath sounds: Wheezing present. Comments: No acute respiratory distress. Breath sounds significantly diminished to auscultation bilaterally with scattered end expiratory wheezes. Abdominal: General: Bowel sounds are normal. There is no distension. Palpations: Abdomen is soft. Tenderness: There is no abdominal tenderness. There is no guarding.      Musculoskeletal:      General: Normal range of motion. Cervical back: Normal range of motion and neck supple.      Neurological: General: No focal deficit present. Mental Status: She is alert and oriented to person, place, and time. Mental status is at baseline.      Psychiatric:      Mood  and Affect: Mood normal.      Behavior: Behavior normal.      Thought Content: Thought content normal.      Inpatient Medications: [Scheduled Medications]     [Scheduled Medications] aspirin, 81 mg, oral, Daily atorvastatin, 80 mg, oral, Nightly dextromethorphan polistirex ER, 30 mg, oral, q12h JCARLOS doxycylcine, 100 mg, oral, q12h JCARLOS guaiFENesin, 600 mg, oral, BID heparin (porcine), 5,000 Units, subcutaneous, q8h insulin lispro, 0-5 Units, subcutaneous, Before meals & nightly ipratropium-albuteroL, 3 mL, nebulization, q6h levothyroxine, 88 mcg, oral, Daily lisinopril 20 mg, hydroCHLOROthiazide 25 mg for Zestoretic/Prinizide, , oral, Daily magnesium oxide, 400 mg of magnesium oxide, oral, Daily methylPREDNISolone sodium succinate (PF), 40 mg, intravenous, q8h metoprolol succinate XL, 25 mg, oral, Daily pantoprazole, 40 mg, oral, Daily before breakfast polyethylene glycol, 17 g, oral, Daily ranolazine, 500 mg, oral, BID sertraline, 100 mg, oral, Daily     PRN Medications [PRN Medications]     [PRN Medications] PRN medications: acetaminophen OR acetaminophen OR acetaminophen, benzonatate, dextrose, dextrose, glucagon, glucagon, hydrOXYzine HCL, ipratropium-albuteroL, melatonin, ondansetron OR ondansetron     Continuous Medications: [Continuous Medications]     [Continuous Medications] oxygen, 1 Dose     LABS AND IMAGING:     Labs: Results from last 7 days Lab Units 08/03/25 0632 08/02/25 0712 08/01/25 0608 WBC AUTO x103/uL 15.4 14.3* 9.5 RBC AUTO x106/uL 4.21 4.31 4.01 HEMOGLOBIN g/dL 12.6 12.9 12.2 HEMATOCRIT % 38.4 38.9 37.2 MCV fL 91 90 93 MCH pg 29.9 29.9 30.4 MCHC g/dL 32.8 33.2 32.8 RDW % 12.9 12.8 12.9 PLATELETS AUTO x103/uL 360 379 303     Results from last 7 days Lab Units 08/03/25 0632 08/02/25 0712 08/01/25 0608 07/30/25 0544 07/29/25 1522 SODIUM mmol/L 138 137 139 < > 135* POTASSIUM mmol/L 4.1 4.3 4.9 < > 4.3 CHLORIDE mmol/L 99 99 102 < > 99 CO2 mmol/L 32 31 32 < > 23 BUN mg/dL 37* 32* 28* < > 41*  CREATININE mg/dL 0.93 0.88 0.90 < > 1.86* GLUCOSE mg/dL 184* 154* 196* < > 201* PROTEIN TOTAL g/dL -- -- -- -- 7.4 CALCIUM mg/dL 9.2 9.6 9.5 < > 9.2 BILIRUBIN TOTAL mg/dL -- -- -- -- 0.4 ALK PHOS U/L -- -- -- -- 108 AST U/L -- -- -- -- 18 ALT U/L -- -- -- -- 17 < > = values in this interval not displayed.     Results from last 7 days Lab Units 07/29/25 1522 MAGNESIUM mg/dL 1.98     Results from last 7 days Lab Units 07/29/25 1640 07/29/25 1522 TROPHS ng/L 3 3     Imaging: ECG 12 lead Normal sinus rhythm Normal ECG When compared with ECG of 29-JUL-2025 15:18, (unconfirmed) No significant change was found     ASSESSMENT & PLAN:     Acute COPD suspicion Acute respiratory failure with hypoxia     Continues to be short of breath, has a nonproductive cough     Continuing Solu-Medrol IV, doxycycline for 5 days (w/ end dates) and bronchodilators as ordered     Adding LABA/ICS nebs today 8/3     Currently on 2 L nasal cannula (uses oxygen nocturnally at home and as needed)     Home oxygen evaluation on day of discharge     BELLE, resolved     Creatinine 0.93 today, 1.86 on admission     Hyperlipidemia Essential hypertension CAD Type II DM GERD Hypothyroidism Generalized anxiety     A1c 5.7% this admission     Continue home medications as reconciled     Leukocytosis, reactive to above     Lactic acidosis, resolved     VTE Prophylaxis: Heparin subcutaneous     Pulmonary comments:-Patient making slow progress.  Still wheezing quite a bit.  Agree with stopping IV Solu-Medrol and IV doxycycline as patient received 5 days of each.  He is being started budesonide/formoterol nebs today.  Agree with consideration of discharge tomorrow if doing well.  Patient to follow-up with Dr. Cordoba after discharge.     I spent 25 minutes in the professional and overall care of this patient.      Lit Loya MD

## 2025-08-03 NOTE — CARE PLAN
The patient's goals for the shift include feel better    The clinical goals for the shift include saftey    Problem: Pain - Adult  Goal: Verbalizes/displays adequate comfort level or baseline comfort level  Outcome: Progressing     Problem: Safety - Adult  Goal: Free from fall injury  Outcome: Progressing     Problem: Discharge Planning  Goal: Discharge to home or other facility with appropriate resources  Outcome: Progressing     Problem: Chronic Conditions and Co-morbidities  Goal: Patient's chronic conditions and co-morbidity symptoms are monitored and maintained or improved  Outcome: Progressing     Problem: Nutrition  Goal: Nutrient intake appropriate for maintaining nutritional needs  Outcome: Progressing     Problem: Diabetes  Goal: Maintain electrolyte levels within acceptable range throughout shift  Outcome: Progressing  Goal: Maintain glucose levels >70mg/dl to <250mg/dl throughout shift  Outcome: Progressing  Goal: Learn about and adhere to nutrition recommendations by end of shift  Outcome: Progressing  Goal: Vital signs within normal range for age by end of shift  Outcome: Progressing  Goal: Receive DSME education by end of shift  Outcome: Progressing     Problem: Fall/Injury  Goal: Not fall by end of shift  Outcome: Progressing  Goal: Be free from injury by end of the shift  Outcome: Progressing  Goal: Verbalize understanding of personal risk factors for fall in the hospital  Outcome: Progressing  Goal: Verbalize understanding of risk factor reduction measures to prevent injury from fall in the home  Outcome: Progressing  Goal: Use assistive devices by end of the shift  Outcome: Progressing  Goal: Pace activities to prevent fatigue by end of the shift  Outcome: Progressing

## 2025-08-04 VITALS
DIASTOLIC BLOOD PRESSURE: 58 MMHG | SYSTOLIC BLOOD PRESSURE: 125 MMHG | TEMPERATURE: 97.7 F | RESPIRATION RATE: 16 BRPM | HEIGHT: 58 IN | HEART RATE: 80 BPM | WEIGHT: 178.13 LBS | OXYGEN SATURATION: 91 % | BODY MASS INDEX: 37.39 KG/M2

## 2025-08-04 DIAGNOSIS — J96.22 ACUTE AND CHRONIC RESPIRATORY FAILURE WITH HYPERCAPNIA: ICD-10-CM

## 2025-08-04 LAB
ANION GAP SERPL CALC-SCNC: 11 MMOL/L (ref 10–20)
BASOPHILS # BLD MANUAL: 0 X10*3/UL (ref 0–0.1)
BASOPHILS NFR BLD MANUAL: 0 %
BUN SERPL-MCNC: 41 MG/DL (ref 6–23)
CALCIUM SERPL-MCNC: 9.5 MG/DL (ref 8.6–10.3)
CHLORIDE SERPL-SCNC: 98 MMOL/L (ref 98–107)
CO2 SERPL-SCNC: 33 MMOL/L (ref 21–32)
CREAT SERPL-MCNC: 0.97 MG/DL (ref 0.5–1.05)
EGFRCR SERPLBLD CKD-EPI 2021: 66 ML/MIN/1.73M*2
EOSINOPHIL # BLD MANUAL: 0 X10*3/UL (ref 0–0.7)
EOSINOPHIL NFR BLD MANUAL: 0 %
ERYTHROCYTE [DISTWIDTH] IN BLOOD BY AUTOMATED COUNT: 13 % (ref 11.5–14.5)
GLUCOSE BLD MANUAL STRIP-MCNC: 161 MG/DL (ref 74–99)
GLUCOSE BLD MANUAL STRIP-MCNC: 190 MG/DL (ref 74–99)
GLUCOSE BLD MANUAL STRIP-MCNC: 275 MG/DL (ref 74–99)
GLUCOSE SERPL-MCNC: 167 MG/DL (ref 74–99)
HCT VFR BLD AUTO: 38.1 % (ref 36–46)
HGB BLD-MCNC: 12.6 G/DL (ref 12–16)
HOLD SPECIMEN: NORMAL
HOLD SPECIMEN: NORMAL
IMM GRANULOCYTES # BLD AUTO: 1.21 X10*3/UL (ref 0–0.7)
IMM GRANULOCYTES NFR BLD AUTO: 6.4 % (ref 0–0.9)
LYMPHOCYTES # BLD MANUAL: 3.2 X10*3/UL (ref 1.2–4.8)
LYMPHOCYTES NFR BLD MANUAL: 17 %
MCH RBC QN AUTO: 30 PG (ref 26–34)
MCHC RBC AUTO-ENTMCNC: 33.1 G/DL (ref 32–36)
MCV RBC AUTO: 91 FL (ref 80–100)
METAMYELOCYTES # BLD MANUAL: 0.19 X10*3/UL
METAMYELOCYTES NFR BLD MANUAL: 1 %
MONOCYTES # BLD MANUAL: 0.94 X10*3/UL (ref 0.1–1)
MONOCYTES NFR BLD MANUAL: 5 %
MYELOCYTES # BLD MANUAL: 0.19 X10*3/UL
MYELOCYTES NFR BLD MANUAL: 1 %
NEUTROPHILS # BLD MANUAL: 14.29 X10*3/UL (ref 1.2–7.7)
NEUTS BAND # BLD MANUAL: 0.19 X10*3/UL (ref 0–0.7)
NEUTS BAND NFR BLD MANUAL: 1 %
NEUTS SEG # BLD MANUAL: 14.1 X10*3/UL (ref 1.2–7)
NEUTS SEG NFR BLD MANUAL: 75 %
NRBC BLD-RTO: 0 /100 WBCS (ref 0–0)
PLATELET # BLD AUTO: 368 X10*3/UL (ref 150–450)
POTASSIUM SERPL-SCNC: 4.6 MMOL/L (ref 3.5–5.3)
RBC # BLD AUTO: 4.2 X10*6/UL (ref 4–5.2)
RBC MORPH BLD: ABNORMAL
SODIUM SERPL-SCNC: 137 MMOL/L (ref 136–145)
TOTAL CELLS COUNTED BLD: 100
WBC # BLD AUTO: 18.8 X10*3/UL (ref 4.4–11.3)

## 2025-08-04 PROCEDURE — 99239 HOSP IP/OBS DSCHRG MGMT >30: CPT | Performed by: STUDENT IN AN ORGANIZED HEALTH CARE EDUCATION/TRAINING PROGRAM

## 2025-08-04 PROCEDURE — 2500000004 HC RX 250 GENERAL PHARMACY W/ HCPCS (ALT 636 FOR OP/ED): Mod: JZ | Performed by: STUDENT IN AN ORGANIZED HEALTH CARE EDUCATION/TRAINING PROGRAM

## 2025-08-04 PROCEDURE — 94640 AIRWAY INHALATION TREATMENT: CPT

## 2025-08-04 PROCEDURE — 85027 COMPLETE CBC AUTOMATED: CPT | Performed by: STUDENT IN AN ORGANIZED HEALTH CARE EDUCATION/TRAINING PROGRAM

## 2025-08-04 PROCEDURE — 94668 MNPJ CHEST WALL SBSQ: CPT

## 2025-08-04 PROCEDURE — 82374 ASSAY BLOOD CARBON DIOXIDE: CPT | Performed by: STUDENT IN AN ORGANIZED HEALTH CARE EDUCATION/TRAINING PROGRAM

## 2025-08-04 PROCEDURE — 2500000001 HC RX 250 WO HCPCS SELF ADMINISTERED DRUGS (ALT 637 FOR MEDICARE OP): Performed by: STUDENT IN AN ORGANIZED HEALTH CARE EDUCATION/TRAINING PROGRAM

## 2025-08-04 PROCEDURE — 36415 COLL VENOUS BLD VENIPUNCTURE: CPT | Performed by: STUDENT IN AN ORGANIZED HEALTH CARE EDUCATION/TRAINING PROGRAM

## 2025-08-04 PROCEDURE — 85007 BL SMEAR W/DIFF WBC COUNT: CPT | Performed by: STUDENT IN AN ORGANIZED HEALTH CARE EDUCATION/TRAINING PROGRAM

## 2025-08-04 PROCEDURE — 2500000004 HC RX 250 GENERAL PHARMACY W/ HCPCS (ALT 636 FOR OP/ED): Performed by: STUDENT IN AN ORGANIZED HEALTH CARE EDUCATION/TRAINING PROGRAM

## 2025-08-04 PROCEDURE — 2500000002 HC RX 250 W HCPCS SELF ADMINISTERED DRUGS (ALT 637 FOR MEDICARE OP, ALT 636 FOR OP/ED): Performed by: STUDENT IN AN ORGANIZED HEALTH CARE EDUCATION/TRAINING PROGRAM

## 2025-08-04 PROCEDURE — 2500000001 HC RX 250 WO HCPCS SELF ADMINISTERED DRUGS (ALT 637 FOR MEDICARE OP)

## 2025-08-04 PROCEDURE — 82947 ASSAY GLUCOSE BLOOD QUANT: CPT

## 2025-08-04 PROCEDURE — 94760 N-INVAS EAR/PLS OXIMETRY 1: CPT

## 2025-08-04 RX ORDER — HYDROXYZINE HYDROCHLORIDE 25 MG/1
25 TABLET, FILM COATED ORAL EVERY 8 HOURS PRN
Qty: 90 TABLET | Refills: 0 | Status: SHIPPED | OUTPATIENT
Start: 2025-08-04

## 2025-08-04 RX ORDER — PREDNISONE 10 MG/1
TABLET ORAL
Qty: 45 TABLET | Refills: 0 | OUTPATIENT
Start: 2025-08-04

## 2025-08-04 RX ORDER — PREDNISONE 10 MG/1
TABLET ORAL
Qty: 45 TABLET | Refills: 0 | Status: SHIPPED | OUTPATIENT
Start: 2025-08-04 | End: 2025-08-22

## 2025-08-04 RX ORDER — HYDROXYZINE HYDROCHLORIDE 10 MG/1
10 TABLET, FILM COATED ORAL EVERY 6 HOURS PRN
Qty: 120 TABLET | Refills: 0 | Status: SHIPPED | OUTPATIENT
Start: 2025-08-04 | End: 2025-08-04 | Stop reason: HOSPADM

## 2025-08-04 RX ADMIN — ASPIRIN 81 MG: 81 TABLET, COATED ORAL at 09:08

## 2025-08-04 RX ADMIN — HEPARIN SODIUM 5000 UNITS: 5000 INJECTION INTRAVENOUS; SUBCUTANEOUS at 06:14

## 2025-08-04 RX ADMIN — PANTOPRAZOLE SODIUM 40 MG: 40 TABLET, DELAYED RELEASE ORAL at 06:14

## 2025-08-04 RX ADMIN — FORMOTEROL FUMARATE 20 MCG: 20 SOLUTION RESPIRATORY (INHALATION) at 07:19

## 2025-08-04 RX ADMIN — IPRATROPIUM BROMIDE AND ALBUTEROL SULFATE 3 ML: 2.5; .5 SOLUTION RESPIRATORY (INHALATION) at 00:36

## 2025-08-04 RX ADMIN — LISINOPRIL: 20 TABLET ORAL at 09:08

## 2025-08-04 RX ADMIN — SERTRALINE HYDROCHLORIDE 100 MG: 50 TABLET, FILM COATED ORAL at 09:08

## 2025-08-04 RX ADMIN — GUAIFENESIN 600 MG: 600 TABLET, EXTENDED RELEASE ORAL at 09:08

## 2025-08-04 RX ADMIN — Medication: at 09:00

## 2025-08-04 RX ADMIN — LEVOTHYROXINE SODIUM 88 MCG: 0.09 TABLET ORAL at 06:14

## 2025-08-04 RX ADMIN — Medication: at 00:36

## 2025-08-04 RX ADMIN — INSULIN LISPRO 1 UNITS: 100 INJECTION, SOLUTION INTRAVENOUS; SUBCUTANEOUS at 06:49

## 2025-08-04 RX ADMIN — MAGNESIUM OXIDE 400 MG (241.3 MG MAGNESIUM) TABLET 1 TABLET: TABLET at 09:08

## 2025-08-04 RX ADMIN — BUDESONIDE 0.5 MG: 0.5 INHALANT RESPIRATORY (INHALATION) at 07:19

## 2025-08-04 RX ADMIN — POLYETHYLENE GLYCOL 3350 17 G: 17 POWDER, FOR SOLUTION ORAL at 09:09

## 2025-08-04 RX ADMIN — DEXTROMETHORPHAN POLISTIREX 30 MG: 30 SUSPENSION ORAL at 09:08

## 2025-08-04 RX ADMIN — IPRATROPIUM BROMIDE AND ALBUTEROL SULFATE 3 ML: 2.5; .5 SOLUTION RESPIRATORY (INHALATION) at 07:19

## 2025-08-04 RX ADMIN — METOPROLOL SUCCINATE 25 MG: 25 TABLET, EXTENDED RELEASE ORAL at 09:08

## 2025-08-04 RX ADMIN — METHYLPREDNISOLONE SODIUM SUCCINATE 40 MG: 40 INJECTION, POWDER, FOR SOLUTION INTRAMUSCULAR; INTRAVENOUS at 06:14

## 2025-08-04 RX ADMIN — RANOLAZINE 500 MG: 500 TABLET, FILM COATED, EXTENDED RELEASE ORAL at 09:08

## 2025-08-04 ASSESSMENT — COGNITIVE AND FUNCTIONAL STATUS - GENERAL
MOBILITY SCORE: 24
DAILY ACTIVITIY SCORE: 24

## 2025-08-04 ASSESSMENT — PAIN SCALES - GENERAL: PAINLEVEL_OUTOF10: 0 - NO PAIN

## 2025-08-04 NOTE — NURSING NOTE
AVS printed and reviewed with patient. All belonging sent with patient. New medication discussed with patient.iv removed/tele off.

## 2025-08-04 NOTE — CARE PLAN
The patient's goals for the shift include feel better    The clinical goals for the shift include Pt SpO2 will remain above 92% on 3L NC.      Problem: Discharge Planning  Goal: Discharge to home or other facility with appropriate resources  Outcome: Progressing     Problem: Chronic Conditions and Co-morbidities  Goal: Patient's chronic conditions and co-morbidity symptoms are monitored and maintained or improved  Outcome: Progressing

## 2025-08-04 NOTE — PROGRESS NOTES
Date/Time SpO2 Medical Gas Therapy Medical Gas Delivery Method Oxygen L/min Patient is on During the Study Patient Activity During Study    08/04/25 0900 91 %  Supplemental oxygen  Nasal cannula  3  --  --     08/04/25 0940 87 %  None (Room air)  --  --  At rest     08/04/25 0942 92 %  Supplemental oxygen  Nasal cannula  2 L/min  At rest     08/04/25 0944 93 %  Supplemental oxygen  Nasal cannula  2 L/min  Ambulating     08/04/25 0947 94 %  Supplemental oxygen  Nasal cannula  2 L/min  Ambulating     08/04/25 0951 91 %  Supplemental oxygen  Nasal cannula  2 L/min  Ambulating        Was a Home Oxygen Evaluation Performed? Yes  Based on the Home Oxygen Evaluation, Does the Patient Qualify for Home Oxygen Therapy? Yes            Recommendations:    Recommended Oxygen Dose at Rest is 2 L/min   Recommended Oxygen Dose with Activity is 2 L/min      2LNC continuous  POC 3

## 2025-08-04 NOTE — CARE PLAN
The patient's goals for the shift include feel better    The clinical goals for the shift include saftey

## 2025-08-04 NOTE — DISCHARGE SUMMARY
Discharge Diagnosis  Acute on chronic hypoxic respiratory failure 2/2 COPD exacerbation (Multi)       Issues Requiring Follow-Up  Wean O2  Post hospital follow up    Discharge Meds     Medication List      START taking these medications     hydrOXYzine HCL 25 mg tablet; Commonly known as: Atarax; Take 1 tablet   (25 mg) by mouth every 8 hours if needed for anxiety.     CHANGE how you take these medications     predniSONE 10 mg tablet; Commonly known as: Deltasone; Take 4 tablets   (40 mg) by mouth once daily for 3 days, THEN 3 tablets (30 mg) once daily   for 3 days, THEN 3 tablets (30 mg) once daily for 3 days, THEN 2 tablets   (20 mg) once daily for 3 days, THEN 2 tablets (20 mg) once daily for 3   days, THEN 1 tablet (10 mg) once daily for 3 days.; Start taking on:   August 4, 2025; What changed: See the new instructions.     CONTINUE taking these medications     albuterol 90 mcg/actuation inhaler; Commonly known as: Ventolin HFA;   Inhale 2 puffs every 4 hours if needed for wheezing or shortness of   breath. Dispense ventolin not proair   aspirin 81 mg EC tablet   atorvastatin 80 mg tablet; Commonly known as: Lipitor; Take 1 tablet (80   mg) by mouth once daily.   Blood Glucose Test; Generic drug: blood sugar diagnostic; 1 each once   daily. Start checking sugars at least first thing in the morning before   eating   famotidine 20 mg tablet; Commonly known as: Pepcid; Take 1 tablet (20   mg) by mouth 2 times a day.   ipratropium-albuteroL 0.5-2.5 mg/3 mL nebulizer solution; Commonly known   as: Duo-Neb; Take 3 mL by nebulization 4 times a day.   lancets misc; 1 each once daily.   levothyroxine 88 mcg tablet; Commonly known as: Synthroid, Levoxyl; TAKE   1 TABLET EVERY MORNING BEFORE MEAL   lisinopriL-hydrochlorothiazide 20-25 mg tablet; TAKE 1 TABLET BY MOUTH   EVERY DAY   magnesium oxide 400 mg (241.3 mg elemental) tablet; Commonly known as:   Mag-Ox; Take 1 tablet (400 mg) by mouth once daily.   metoprolol  succinate XL 25 mg 24 hr tablet; Commonly known as:   Toprol-XL; TAKE 1 TABLET BY MOUTH EVERY DAY   Mucus Relief  mg 12 hr tablet; Generic drug: guaiFENesin; TAKE 1   OR 2 TABLETS BY MOUTH TWICE DAILY AS NEEDED FOR CONGESTION.   ondansetron ODT 4 mg disintegrating tablet; Commonly known as:   Zofran-ODT   pantoprazole 40 mg EC tablet; Commonly known as: ProtoNix; TAKE 1 TABLET   (40 MG) BY MOUTH ONCE DAILY IN THE MORNING. TAKE BEFORE MEALS. DO NOT   CRUSH, CHEW, OR SPLIT.   ranolazine 500 mg 12 hr tablet; Commonly known as: Ranexa; Take 1 tablet   (500 mg) by mouth 2 times a day.   sertraline 100 mg tablet; Commonly known as: Zoloft; TAKE 1 TABLET BY   MOUTH EVERY DAY   Trelegy Ellipta 100-62.5-25 mcg blister with device; Generic drug:   fluticasone-umeclidin-vilanter   Trulicity 4.5 mg/0.5 mL pen injector; Generic drug: dulaglutide; Inject   4.5 mg under the skin 1 (one) time per week.   Ultra-Light Rollator misc; Generic drug: walker; 1 Device once daily.     STOP taking these medications     azithromycin 250 mg tablet; Commonly known as: Zithromax   oxygen gas therapy; Commonly known as: O2   promethazine-DM 6.25-15 mg/5 mL syrup; Commonly known as: Phenergan-DM       Test Results Pending At Discharge  Pending Labs       No current pending labs.            Hospital Course   Pt is a 60 y.o. female with a history of COPD, chronic hypoxic respiratory failure on nightly 2L home oxygen, CAD, essential hypertension, type 2 diabetes, GERD, hypothyroidism, anxiety presenting with acute on chronic hypoxic respiratory failure secondary to COPD exacerbation. Pulm was consulted. She was given doxycycline, duonebs, steroids. She will need 2L NC continuously upon dc. She had Giancarlo and was given IVF. Pt is hemodynamically stable for discharge at this time with close outpatient follow ups.     The patient was discharged in satisfactory condition.   Medications and side effect profile reviewed with patient.  More than 30  minutes were spent in coordinating patient discharge     Pertinent Physical Exam At Time of Discharge  Physical Exam  Vitals and nursing note reviewed.   Constitutional:       General: She is not in acute distress.     Appearance: Normal appearance. She is not ill-appearing or toxic-appearing.   HENT:      Head: Normocephalic and atraumatic.      Nose: Nose normal.      Mouth/Throat:      Mouth: Mucous membranes are moist.     Eyes:      Extraocular Movements: Extraocular movements intact.      Conjunctiva/sclera: Conjunctivae normal.      Pupils: Pupils are equal, round, and reactive to light.       Cardiovascular:      Rate and Rhythm: Normal rate and regular rhythm.      Heart sounds: No murmur heard.     No gallop.   Pulmonary:      Effort: Pulmonary effort is normal. No respiratory distress.      Breath sounds: Wheezing and rhonchi present. No rales.   Abdominal:      General: Abdomen is flat. Bowel sounds are normal. There is no distension.      Palpations: Abdomen is soft. There is no mass.      Tenderness: There is no abdominal tenderness.     Musculoskeletal:         General: No swelling or tenderness. Normal range of motion.      Cervical back: Normal range of motion and neck supple.     Skin:     General: Skin is warm and dry.     Neurological:      General: No focal deficit present.      Mental Status: She is alert and oriented to person, place, and time. Mental status is at baseline.     Psychiatric:         Mood and Affect: Mood normal.         Behavior: Behavior normal.         Thought Content: Thought content normal.         Judgment: Judgment normal.         Outpatient Follow-Up  Future Appointments   Date Time Provider Department [place]   8/21/2025  9:15 AM Lester Lizarraga MD [id number] [place]   8/27/2025  2:00 PM Elena Renteria PA-C [id number] [facility]   9/11/2025  9:00 AM Raghav Garnett DO [id number] [place]   2/11/2026  7:30 AM Jean Faith MD [id number] [place]         Ijeoma MONTEIRO  MD [place]  hospitalist

## 2025-08-04 NOTE — PROGRESS NOTES
Date/Time SpO2 Medical Gas Therapy Medical Gas Delivery Method Oxygen L/min Patient is on During the Study Patient Activity During Study    08/04/25 0900 91 %  Supplemental oxygen  Nasal cannula  3  --  --     08/04/25 0940 87 %  None (Room air)  --  --  At rest     08/04/25 0942 92 %  Supplemental oxygen  Nasal cannula  2 L/min  At rest     08/04/25 0944 93 %  Supplemental oxygen  Nasal cannula  2 L/min  Ambulating     08/04/25 0947 94 %  Supplemental oxygen  Nasal cannula  2 L/min  Ambulating     08/04/25 0951 91 %  Supplemental oxygen  Nasal cannula  2 L/min  Ambulating        Was a Home Oxygen Evaluation Performed? Yes  Based on the Home Oxygen Evaluation, Does the Patient Qualify for Home Oxygen Therapy? Yes        Patient qualified for 2L at rest and 2L with exertion POC3  Patient experienced a moment of dizziness and lack of balance during walk study. Patient also stated she was having a panic attack. O2 saturations stayed above 90%.  notified    Recommendations:    Recommended Oxygen Dose at Rest is 2 L/min   Recommended Oxygen Dose with Activity is 2 L/min

## 2025-08-05 ENCOUNTER — PATIENT OUTREACH (OUTPATIENT)
Dept: PRIMARY CARE | Facility: CLINIC | Age: 60
End: 2025-08-05
Payer: MEDICARE

## 2025-08-05 NOTE — PROGRESS NOTES
Discharge Facility: [facility]  Discharge Diagnosis: Acute on chronic hypoxic respiratory failure 2/2 COPD exacerbation    Admission Date: 7/29/2025  Discharge Date: 8/4/2025    PCP Appointment Date: 8/8/2025  Specialist Appointment Date:   -vas surgery 8/21/2025  -GI 8/27/2025  -cardiology 2/11/2025    Hospital Encounter and Summary Linked: Yes  ED to Hosp-Admission (Discharged) with Ijeoma Mayers MD; Eliud Rushing DO (07/29/2025)   See discharge assessment below for further details    Hospital Course   Pt is a 60 y.o. female with a history of COPD, chronic hypoxic respiratory failure on nightly 2L home oxygen, CAD, essential hypertension, type 2 diabetes, GERD, hypothyroidism, anxiety presenting with acute on chronic hypoxic respiratory failure secondary to COPD exacerbation. Pulm was consulted. She was given doxycycline, duonebs, steroids. She will need 2L NC continuously upon dc. She had Giancarlo and was given IVF. Pt is hemodynamically stable for discharge at this time with close outpatient follow ups.     Wrap Up  Wrap Up Additional Comments: CTS spoke with patient She was admitted to [facility] on 7/29/2025 with Acute on chronic hypoxic respiratory failure 2/2 COPD exacerbation. Discharged home with no home care needs on 8/4/2025. Patient stated that she was feeling better. She denies any chest pains. She stated that she does have some SOB, however she is starting to breathe better. Patient is on oxygen at home. Reviewed medications. Patient denies any issues with obtaining the new medications. She is aware of the changes in her prednisone. Patient does have an appt with PCP on 8/8/2025.Patient voiced no questions or concerns at this time. Patient has my contact information for non- emergent issues that may arise. (8/5/2025  9:19 AM)  Call End Time: 0934 (8/5/2025  9:19 AM)    Engagement  Call Start Time: 0919 (8/5/2025  9:19 AM)    Medications  Medications reviewed with patient/caregiver?: Yes (8/5/2025  9:19 AM)  Is the  patient having any side effects they believe may be caused by any medication additions or changes?: No (8/5/2025  9:19 AM)  Does the patient have all medications ordered at discharge?: Yes (8/5/2025  9:19 AM)  Care Management Interventions: No intervention needed (8/5/2025  9:19 AM)  Prescription Comments: start: atarax. change: prednisone. stop: zithromax (8/5/2025  9:19 AM)  Is the patient taking all medications as directed (includes completed medication regime)?: Yes (8/5/2025  9:19 AM)  Medication Comments: see med list (8/5/2025  9:19 AM)    Appointments  Does the patient have a primary care provider?: Yes (8/5/2025 9:19 AM)  Care Management Interventions: Verified appointment date/time/provider (8/5/2025  9:19 AM)  Has the patient kept scheduled appointments due by today?: Yes (8/5/2025  9:19 AM)  Care Management Interventions: Advised patient to keep appointment (8/5/2025  9:19 AM)    Self Management  What is the home health agency?: none (8/5/2025  9:19 AM)  Has home health visited the patient within 72 hours of discharge?: Not applicable (8/5/2025  9:19 AM)  What Durable Medical Equipment (DME) was ordered?: n/a (8/5/2025  9:19 AM)    Patient Teaching  Does the patient have access to their discharge instructions?: Yes (8/5/2025  9:19 AM)  Care Management Interventions: Reviewed instructions with patient (8/5/2025  9:19 AM)  What is the patient's perception of their health status since discharge?: Improving (8/5/2025  9:19 AM)  Is the patient/caregiver able to teach back the hierarchy of who to call/visit for symptoms/problems? PCP, Specialist, Home Health nurse, Urgent Care, ED, 911: Yes (8/5/2025  9:19 AM)  Patient/Caregiver Education Comments: see wrap up (8/5/2025  9:19 AM)

## 2025-08-08 ENCOUNTER — APPOINTMENT (OUTPATIENT)
Dept: PRIMARY CARE | Facility: CLINIC | Age: 60
End: 2025-08-08
Payer: MEDICARE

## 2025-08-12 DIAGNOSIS — J44.1 COPD EXACERBATION (MULTI): ICD-10-CM

## 2025-08-12 DIAGNOSIS — E11.9 TYPE 2 DIABETES MELLITUS WITHOUT COMPLICATION, WITHOUT LONG-TERM CURRENT USE OF INSULIN: Primary | ICD-10-CM

## 2025-08-13 ENCOUNTER — PATIENT OUTREACH (OUTPATIENT)
Dept: PRIMARY CARE | Facility: CLINIC | Age: 60
End: 2025-08-13
Payer: MEDICARE

## 2025-08-21 ENCOUNTER — OFFICE VISIT (OUTPATIENT)
Dept: VASCULAR SURGERY | Facility: CLINIC | Age: 60
End: 2025-08-21
Payer: MEDICARE

## 2025-08-21 VITALS
BODY MASS INDEX: 37.36 KG/M2 | WEIGHT: 178 LBS | HEART RATE: 83 BPM | SYSTOLIC BLOOD PRESSURE: 130 MMHG | HEIGHT: 58 IN | DIASTOLIC BLOOD PRESSURE: 41 MMHG | RESPIRATION RATE: 16 BRPM | TEMPERATURE: 97.3 F

## 2025-08-21 DIAGNOSIS — R10.84 GENERALIZED ABDOMINAL PAIN: Primary | ICD-10-CM

## 2025-08-21 PROCEDURE — 99214 OFFICE O/P EST MOD 30 MIN: CPT | Performed by: SURGERY

## 2025-08-21 PROCEDURE — 3008F BODY MASS INDEX DOCD: CPT | Performed by: SURGERY

## 2025-08-21 PROCEDURE — 3044F HG A1C LEVEL LT 7.0%: CPT | Performed by: SURGERY

## 2025-08-21 PROCEDURE — 3075F SYST BP GE 130 - 139MM HG: CPT | Performed by: SURGERY

## 2025-08-21 PROCEDURE — 3078F DIAST BP <80 MM HG: CPT | Performed by: SURGERY

## 2025-08-25 ENCOUNTER — APPOINTMENT (OUTPATIENT)
Dept: PRIMARY CARE | Facility: CLINIC | Age: 60
End: 2025-08-25
Payer: MEDICARE

## 2025-08-25 VITALS
HEART RATE: 82 BPM | SYSTOLIC BLOOD PRESSURE: 110 MMHG | BODY MASS INDEX: 36.73 KG/M2 | OXYGEN SATURATION: 97 % | TEMPERATURE: 96.9 F | HEIGHT: 58 IN | RESPIRATION RATE: 18 BRPM | DIASTOLIC BLOOD PRESSURE: 72 MMHG | WEIGHT: 175 LBS

## 2025-08-25 DIAGNOSIS — F41.9 ANXIETY: ICD-10-CM

## 2025-08-25 DIAGNOSIS — E11.9 TYPE 2 DIABETES MELLITUS WITHOUT COMPLICATION, WITHOUT LONG-TERM CURRENT USE OF INSULIN: Primary | ICD-10-CM

## 2025-08-25 DIAGNOSIS — Z99.81 OXYGEN DEPENDENT: ICD-10-CM

## 2025-08-25 DIAGNOSIS — J44.1 COPD EXACERBATION (MULTI): ICD-10-CM

## 2025-08-25 DIAGNOSIS — R53.83 FATIGUE, UNSPECIFIED TYPE: ICD-10-CM

## 2025-08-25 PROCEDURE — 3078F DIAST BP <80 MM HG: CPT | Performed by: FAMILY MEDICINE

## 2025-08-25 PROCEDURE — 3074F SYST BP LT 130 MM HG: CPT | Performed by: FAMILY MEDICINE

## 2025-08-25 PROCEDURE — 99214 OFFICE O/P EST MOD 30 MIN: CPT | Performed by: FAMILY MEDICINE

## 2025-08-25 PROCEDURE — 96372 THER/PROPH/DIAG INJ SC/IM: CPT | Performed by: FAMILY MEDICINE

## 2025-08-25 PROCEDURE — 3044F HG A1C LEVEL LT 7.0%: CPT | Performed by: FAMILY MEDICINE

## 2025-08-25 PROCEDURE — 3008F BODY MASS INDEX DOCD: CPT | Performed by: FAMILY MEDICINE

## 2025-08-25 RX ORDER — CYANOCOBALAMIN 1000 UG/ML
1000 INJECTION, SOLUTION INTRAMUSCULAR; SUBCUTANEOUS ONCE
Status: COMPLETED | OUTPATIENT
Start: 2025-08-25 | End: 2025-08-25

## 2025-08-25 RX ORDER — HYDROXYZINE HYDROCHLORIDE 25 MG/1
25 TABLET, FILM COATED ORAL EVERY 8 HOURS PRN
Qty: 90 TABLET | Refills: 0 | Status: SHIPPED | OUTPATIENT
Start: 2025-08-25

## 2025-08-25 RX ADMIN — CYANOCOBALAMIN 1000 MCG: 1000 INJECTION, SOLUTION INTRAMUSCULAR; SUBCUTANEOUS at 14:18

## 2025-08-25 ASSESSMENT — ENCOUNTER SYMPTOMS
HEADACHES: 0
POLYPHAGIA: 0
NAUSEA: 0
VOMITING: 0
ARTHRALGIAS: 0
SHORTNESS OF BREATH: 1
FATIGUE: 1
DIARRHEA: 0
DIZZINESS: 0
FREQUENCY: 0
MYALGIAS: 0
POLYDIPSIA: 0
CHEST TIGHTNESS: 1
COUGH: 1
APPETITE CHANGE: 0
NUMBNESS: 0
WEAKNESS: 1

## 2025-08-27 ENCOUNTER — APPOINTMENT (OUTPATIENT)
Dept: GASTROENTEROLOGY | Facility: HOSPITAL | Age: 60
End: 2025-08-27
Payer: MEDICARE

## 2025-09-05 ENCOUNTER — RESULTS FOLLOW-UP (OUTPATIENT)
Dept: PRIMARY CARE | Facility: CLINIC | Age: 60
End: 2025-09-05
Payer: MEDICARE

## 2025-09-05 ENCOUNTER — TELEPHONE (OUTPATIENT)
Dept: PRIMARY CARE | Facility: CLINIC | Age: 60
End: 2025-09-05
Payer: MEDICARE

## 2025-09-11 ENCOUNTER — APPOINTMENT (OUTPATIENT)
Dept: PRIMARY CARE | Facility: CLINIC | Age: 60
End: 2025-09-11
Payer: MEDICARE

## 2025-09-17 ENCOUNTER — APPOINTMENT (OUTPATIENT)
Dept: PHARMACY | Facility: HOSPITAL | Age: 60
End: 2025-09-17
Payer: MEDICARE

## 2025-12-02 ENCOUNTER — APPOINTMENT (OUTPATIENT)
Dept: PRIMARY CARE | Facility: CLINIC | Age: 60
End: 2025-12-02
Payer: MEDICARE

## 2026-02-11 ENCOUNTER — APPOINTMENT (OUTPATIENT)
Dept: CARDIOLOGY | Facility: CLINIC | Age: 61
End: 2026-02-11
Payer: MEDICARE

## (undated) DEVICE — TROCAR: Brand: KII SLEEVE

## (undated) DEVICE — SET,IRRIGATION,CYSTO/TUR: Brand: MEDLINE

## (undated) DEVICE — YANKAUER,SMOOTH HANDLE,HIGH CAPACITY: Brand: MEDLINE INDUSTRIES, INC.

## (undated) DEVICE — COVER LT HNDL BLU PLAS

## (undated) DEVICE — PACK,BASIC: Brand: MEDLINE

## (undated) DEVICE — 1LYRTR 16FR10ML 100%SILI SNAP: Brand: MEDLINE INDUSTRIES, INC.

## (undated) DEVICE — HYPODERMIC SAFETY NEEDLE: Brand: MAGELLAN

## (undated) DEVICE — TROCAR: Brand: KII FIOS FIRST ENTRY

## (undated) DEVICE — MARKER SURG SKIN GENTIAN VLT REG TIP W/ 6IN RUL DYNJSM01

## (undated) DEVICE — BLADE,CARBON-STEEL,10,STRL,DISPOSABLE,TB: Brand: MEDLINE

## (undated) DEVICE — LITHOTOMY DRAPE WITH FLUID CONTROL POUCH: Brand: CONVERTORS

## (undated) DEVICE — GLOVE ORANGE PI 8   MSG9080

## (undated) DEVICE — Device

## (undated) DEVICE — COUNTER NDL 40 COUNT HLD 70 FOAM BLK ADH W/ MAG

## (undated) DEVICE — GLOVE ORANGE PI 7 1/2   MSG9075

## (undated) DEVICE — GOWN,AURORA,NONREINFORCED,LARGE: Brand: MEDLINE

## (undated) DEVICE — SINGLE PORT MANIFOLD: Brand: NEPTUNE 2

## (undated) DEVICE — CONNECTOR TBNG WHT PLAS SUCT STR 5IN1 LTWT W/ M CONN

## (undated) DEVICE — SUTURE VCRL SZ 4-0 L18IN ABSRB UD L19MM PS-2 3/8 CIR PRIM J496H

## (undated) DEVICE — NEPTUNE E-SEP SMOKE EVACUATION PENCIL, COATED, 70MM BLADE, PUSH BUTTON SWITCH: Brand: NEPTUNE E-SEP

## (undated) DEVICE — DRAPE EQUIP TRNSPRT CONTAINMENT FOR BK TAB

## (undated) DEVICE — GOWN,AURORA,NONRNF,XL,30/CS: Brand: MEDLINE

## (undated) DEVICE — ELECTRODE PT RET AD L9FT HI MOIST COND ADH HYDRGEL CORDED

## (undated) DEVICE — TUBING, SUCTION, 9/32" X 12', STRAIGHT: Brand: MEDLINE INDUSTRIES, INC.

## (undated) DEVICE — DRAPE, LAVH, STERILE: Brand: MEDLINE

## (undated) DEVICE — SKIN PREP TRAY 4 COMPARTM TRAY: Brand: MEDLINE INDUSTRIES, INC.

## (undated) DEVICE — SUTURE SZ 0 27IN 5/8 CIR UR-6  TAPER PT VIOLET ABSRB VICRYL J603H

## (undated) DEVICE — POUCH, INSTRUMENT, 3POCKET, INVISISHIELD: Brand: MEDLINE

## (undated) DEVICE — TOWEL,OR,DSP,ST,BLUE,STD,4/PK,20PK/CS: Brand: MEDLINE

## (undated) DEVICE — LABEL MED MINI W/ MARKER

## (undated) DEVICE — SHEARS ENDOSCP HARM 36CM ULTRASONIC CRV TIP UPGRD

## (undated) DEVICE — TISSUE RETRIEVAL SYSTEM: Brand: INZII RETRIEVAL SYSTEM

## (undated) DEVICE — LINER INCONT W7XL14IN PEACH POLYMER FLUF ADH WT CNTOUR DLX

## (undated) DEVICE — SYRINGE MED 10ML LUERLOCK TIP W/O SFTY DISP

## (undated) DEVICE — WARMER SCP 2 ANTIFOG LAP DISP

## (undated) DEVICE — TUBING INSUFFLATION SMK EVAC HI FLO SET PNEUMOCLEAR

## (undated) DEVICE — LIQUIBAND RAPID ADHESIVE 36/CS 0.8ML: Brand: MEDLINE

## (undated) DEVICE — SUTURE VCRL SZ 3-0 L27IN ABSRB UD L26MM SH 1/2 CIR J416H

## (undated) DEVICE — GAUZE,SPONGE,4"X4",16PLY,XRAY,STRL,LF: Brand: MEDLINE

## (undated) DEVICE — APPLICATOR MEDICATED 26 CC SOLUTION HI LT ORNG CHLORAPREP

## (undated) DEVICE — BLADE,CARBON-STEEL,11,STRL,DISPOSABLE,TB: Brand: MEDLINE

## (undated) DEVICE — KIT,ANTI FOG,W/SPONGE & FLUID,SOFT PACK: Brand: MEDLINE